# Patient Record
Sex: MALE | Race: WHITE | Employment: OTHER | URBAN - NONMETROPOLITAN AREA
[De-identification: names, ages, dates, MRNs, and addresses within clinical notes are randomized per-mention and may not be internally consistent; named-entity substitution may affect disease eponyms.]

---

## 2021-04-02 ENCOUNTER — HOSPITAL ENCOUNTER (OUTPATIENT)
Age: 61
Discharge: HOME | End: 2021-04-02
Payer: COMMERCIAL

## 2021-04-02 LAB
ABSOLUTE EOS #: 0.31 K/UL (ref 0–0.44)
ABSOLUTE IMMATURE GRANULOCYTE: 0 K/UL (ref 0–0.3)
ABSOLUTE LYMPH #: 0.85 K/UL (ref 1.1–3.7)
ABSOLUTE MONO #: 0.37 K/UL (ref 0.1–1.2)
ALBUMIN SERPL-MCNC: 3.9 G/DL (ref 3.5–5.2)
ALBUMIN/GLOBULIN RATIO: 1.6 (ref 1–2.5)
ALP BLD-CCNC: 81 U/L (ref 40–129)
ALT SERPL-CCNC: 35 U/L (ref 5–41)
ANION GAP SERPL CALCULATED.3IONS-SCNC: 9 MMOL/L (ref 9–17)
AST SERPL-CCNC: 26 U/L
BASOPHILS # BLD: 0 % (ref 0–2)
BASOPHILS ABSOLUTE: 0 K/UL (ref 0–0.2)
BILIRUB SERPL-MCNC: 0.17 MG/DL (ref 0.3–1.2)
BUN BLDV-MCNC: 35 MG/DL (ref 8–23)
BUN/CREAT BLD: 31 (ref 9–20)
CALCIUM SERPL-MCNC: 9 MG/DL (ref 8.6–10.4)
CHLORIDE BLD-SCNC: 106 MMOL/L (ref 98–107)
CHOLESTEROL/HDL RATIO: 5.2
CHOLESTEROL: 157 MG/DL
CO2: 25 MMOL/L (ref 20–31)
CREAT SERPL-MCNC: 1.13 MG/DL (ref 0.7–1.2)
CREATININE URINE: 66.9 MG/DL (ref 39–259)
DIFFERENTIAL TYPE: ABNORMAL
EOSINOPHILS RELATIVE PERCENT: 5 % (ref 1–4)
FERRITIN: 138 UG/L (ref 30–400)
GFR AFRICAN AMERICAN: >60 ML/MIN
GFR NON-AFRICAN AMERICAN: >60 ML/MIN
GFR SERPL CREATININE-BSD FRML MDRD: ABNORMAL ML/MIN/{1.73_M2}
GFR SERPL CREATININE-BSD FRML MDRD: ABNORMAL ML/MIN/{1.73_M2}
GLUCOSE BLD-MCNC: 135 MG/DL (ref 70–99)
HCT VFR BLD CALC: 32.8 % (ref 40.7–50.3)
HDLC SERPL-MCNC: 30 MG/DL
HEMOGLOBIN: 10.7 G/DL (ref 13–17)
IMMATURE GRANULOCYTES: 0 %
IRON SATURATION: 15 % (ref 20–55)
IRON: 45 UG/DL (ref 59–158)
LDL CHOLESTEROL DIRECT: 72 MG/DL
LDL CHOLESTEROL: ABNORMAL MG/DL (ref 0–130)
LYMPHOCYTES # BLD: 14 % (ref 24–43)
MAGNESIUM: 2.2 MG/DL (ref 1.6–2.6)
MCH RBC QN AUTO: 29.3 PG (ref 25.2–33.5)
MCHC RBC AUTO-ENTMCNC: 32.6 G/DL (ref 28.4–34.8)
MCV RBC AUTO: 89.9 FL (ref 82.6–102.9)
MICROALBUMIN/CREAT 24H UR: <12 MG/L
MICROALBUMIN/CREAT UR-RTO: NORMAL MCG/MG CREAT
MONOCYTES # BLD: 6 % (ref 3–12)
MORPHOLOGY: NORMAL
NRBC AUTOMATED: 0 PER 100 WBC
PDW BLD-RTO: 14 % (ref 11.8–14.4)
PLATELET # BLD: 174 K/UL (ref 138–453)
PLATELET ESTIMATE: ABNORMAL
PMV BLD AUTO: 8.9 FL (ref 8.1–13.5)
POTASSIUM SERPL-SCNC: 4.3 MMOL/L (ref 3.7–5.3)
RBC # BLD: 3.65 M/UL (ref 4.21–5.77)
RBC # BLD: ABNORMAL 10*6/UL
SEG NEUTROPHILS: 75 % (ref 36–65)
SEGMENTED NEUTROPHILS ABSOLUTE COUNT: 4.57 K/UL (ref 1.5–8.1)
SODIUM BLD-SCNC: 140 MMOL/L (ref 135–144)
TOTAL IRON BINDING CAPACITY: 295 UG/DL (ref 250–450)
TOTAL PROTEIN: 6.3 G/DL (ref 6.4–8.3)
TRIGL SERPL-MCNC: 419 MG/DL
TSH SERPL DL<=0.05 MIU/L-ACNC: 4.27 MIU/L (ref 0.3–5)
UNSATURATED IRON BINDING CAPACITY: 250 UG/DL (ref 112–347)
URIC ACID: 5.9 MG/DL (ref 3.4–7)
VITAMIN D 25-HYDROXY: 35.9 NG/ML (ref 30–100)
VLDLC SERPL CALC-MCNC: ABNORMAL MG/DL (ref 1–30)
WBC # BLD: 6.1 K/UL (ref 3.5–11.3)
WBC # BLD: ABNORMAL 10*3/UL

## 2021-04-02 PROCEDURE — 83550 IRON BINDING TEST: CPT

## 2021-04-02 PROCEDURE — 85025 COMPLETE CBC W/AUTO DIFF WBC: CPT

## 2021-04-02 PROCEDURE — 80061 LIPID PANEL: CPT

## 2021-04-02 PROCEDURE — 83036 HEMOGLOBIN GLYCOSYLATED A1C: CPT

## 2021-04-02 PROCEDURE — 83540 ASSAY OF IRON: CPT

## 2021-04-02 PROCEDURE — 82570 ASSAY OF URINE CREATININE: CPT

## 2021-04-02 PROCEDURE — 36415 COLL VENOUS BLD VENIPUNCTURE: CPT

## 2021-04-02 PROCEDURE — 80053 COMPREHEN METABOLIC PANEL: CPT

## 2021-04-02 PROCEDURE — 83721 ASSAY OF BLOOD LIPOPROTEIN: CPT

## 2021-04-02 PROCEDURE — 84550 ASSAY OF BLOOD/URIC ACID: CPT

## 2021-04-02 PROCEDURE — 82306 VITAMIN D 25 HYDROXY: CPT

## 2021-04-02 PROCEDURE — 83735 ASSAY OF MAGNESIUM: CPT

## 2021-04-02 PROCEDURE — 82043 UR ALBUMIN QUANTITATIVE: CPT

## 2021-04-02 PROCEDURE — 82728 ASSAY OF FERRITIN: CPT

## 2021-04-02 PROCEDURE — 84443 ASSAY THYROID STIM HORMONE: CPT

## 2021-04-06 LAB
ESTIMATED AVERAGE GLUCOSE: 169 MG/DL
HBA1C MFR BLD: 7.5 % (ref 4–6)

## 2021-08-07 ENCOUNTER — ANESTHESIA EVENT (OUTPATIENT)
Dept: OPERATING ROOM | Age: 61
End: 2021-08-07
Payer: COMMERCIAL

## 2021-08-07 ENCOUNTER — HOSPITAL ENCOUNTER (OUTPATIENT)
Age: 61
Setting detail: OBSERVATION
LOS: 1 days | Discharge: HOME | End: 2021-08-08
Attending: INTERNAL MEDICINE | Admitting: INTERNAL MEDICINE
Payer: COMMERCIAL

## 2021-08-07 ENCOUNTER — ANESTHESIA (OUTPATIENT)
Dept: OPERATING ROOM | Age: 61
End: 2021-08-07
Payer: COMMERCIAL

## 2021-08-07 VITALS — DIASTOLIC BLOOD PRESSURE: 75 MMHG | SYSTOLIC BLOOD PRESSURE: 145 MMHG | OXYGEN SATURATION: 100 %

## 2021-08-07 DIAGNOSIS — K37 APPENDICITIS, UNSPECIFIED APPENDICITIS TYPE: Primary | ICD-10-CM

## 2021-08-07 PROBLEM — K35.80 ACUTE APPENDICITIS: Status: ACTIVE | Noted: 2021-08-07

## 2021-08-07 PROCEDURE — 64488 TAP BLOCK BI INJECTION: CPT | Performed by: NURSE ANESTHETIST, CERTIFIED REGISTERED

## 2021-08-07 PROCEDURE — G0378 HOSPITAL OBSERVATION PER HR: HCPCS

## 2021-08-07 PROCEDURE — 2720000010 HC SURG SUPPLY STERILE: Performed by: SURGERY

## 2021-08-07 PROCEDURE — 2500000003 HC RX 250 WO HCPCS: Performed by: NURSE ANESTHETIST, CERTIFIED REGISTERED

## 2021-08-07 PROCEDURE — 6360000002 HC RX W HCPCS: Performed by: SURGERY

## 2021-08-07 PROCEDURE — 99223 1ST HOSP IP/OBS HIGH 75: CPT | Performed by: SURGERY

## 2021-08-07 PROCEDURE — 44970 LAPAROSCOPY APPENDECTOMY: CPT | Performed by: SURGERY

## 2021-08-07 PROCEDURE — 3700000001 HC ADD 15 MINUTES (ANESTHESIA): Performed by: SURGERY

## 2021-08-07 PROCEDURE — 3700000000 HC ANESTHESIA ATTENDED CARE: Performed by: SURGERY

## 2021-08-07 PROCEDURE — 2580000003 HC RX 258: Performed by: NURSE ANESTHETIST, CERTIFIED REGISTERED

## 2021-08-07 PROCEDURE — 6360000002 HC RX W HCPCS: Performed by: NURSE ANESTHETIST, CERTIFIED REGISTERED

## 2021-08-07 PROCEDURE — 3600000004 HC SURGERY LEVEL 4 BASE: Performed by: SURGERY

## 2021-08-07 PROCEDURE — 7100000001 HC PACU RECOVERY - ADDTL 15 MIN: Performed by: SURGERY

## 2021-08-07 PROCEDURE — 3600000014 HC SURGERY LEVEL 4 ADDTL 15MIN: Performed by: SURGERY

## 2021-08-07 PROCEDURE — 2580000003 HC RX 258: Performed by: SURGERY

## 2021-08-07 PROCEDURE — 2709999900 HC NON-CHARGEABLE SUPPLY: Performed by: SURGERY

## 2021-08-07 PROCEDURE — 7100000000 HC PACU RECOVERY - FIRST 15 MIN: Performed by: SURGERY

## 2021-08-07 PROCEDURE — 6370000000 HC RX 637 (ALT 250 FOR IP): Performed by: SURGERY

## 2021-08-07 RX ORDER — SODIUM CHLORIDE 9 MG/ML
25 INJECTION, SOLUTION INTRAVENOUS PRN
Status: DISCONTINUED | OUTPATIENT
Start: 2021-08-07 | End: 2021-08-08 | Stop reason: HOSPADM

## 2021-08-07 RX ORDER — PREGABALIN 50 MG/1
50 CAPSULE ORAL DAILY
COMMUNITY
End: 2022-04-19

## 2021-08-07 RX ORDER — PRAMIPEXOLE DIHYDROCHLORIDE 0.25 MG/1
1.5 TABLET ORAL NIGHTLY
Status: DISCONTINUED | OUTPATIENT
Start: 2021-08-07 | End: 2021-08-08 | Stop reason: HOSPADM

## 2021-08-07 RX ORDER — PREGABALIN 50 MG/1
50 CAPSULE ORAL DAILY
Status: DISCONTINUED | OUTPATIENT
Start: 2021-08-07 | End: 2021-08-08 | Stop reason: HOSPADM

## 2021-08-07 RX ORDER — MIDAZOLAM HYDROCHLORIDE 1 MG/ML
INJECTION INTRAMUSCULAR; INTRAVENOUS PRN
Status: DISCONTINUED | OUTPATIENT
Start: 2021-08-07 | End: 2021-08-07 | Stop reason: SDUPTHER

## 2021-08-07 RX ORDER — ATORVASTATIN CALCIUM 40 MG/1
40 TABLET, FILM COATED ORAL DAILY
COMMUNITY
End: 2022-04-19

## 2021-08-07 RX ORDER — CYCLOBENZAPRINE HCL 10 MG
5 TABLET ORAL 3 TIMES DAILY PRN
Status: DISCONTINUED | OUTPATIENT
Start: 2021-08-07 | End: 2021-08-08 | Stop reason: HOSPADM

## 2021-08-07 RX ORDER — OXYCODONE HYDROCHLORIDE 5 MG/1
5 TABLET ORAL EVERY 4 HOURS PRN
Status: DISCONTINUED | OUTPATIENT
Start: 2021-08-07 | End: 2021-08-08 | Stop reason: HOSPADM

## 2021-08-07 RX ORDER — IBUPROFEN 800 MG/1
800 TABLET ORAL EVERY 8 HOURS PRN
COMMUNITY
End: 2022-05-03

## 2021-08-07 RX ORDER — DULOXETIN HYDROCHLORIDE 30 MG/1
30 CAPSULE, DELAYED RELEASE ORAL DAILY
Status: DISCONTINUED | OUTPATIENT
Start: 2021-08-07 | End: 2021-08-08 | Stop reason: HOSPADM

## 2021-08-07 RX ORDER — SODIUM CHLORIDE 9 MG/ML
INJECTION INTRAVENOUS PRN
Status: DISCONTINUED | OUTPATIENT
Start: 2021-08-07 | End: 2021-08-07 | Stop reason: SDUPTHER

## 2021-08-07 RX ORDER — OMEPRAZOLE 40 MG/1
40 CAPSULE, DELAYED RELEASE ORAL DAILY
Status: DISCONTINUED | OUTPATIENT
Start: 2021-08-07 | End: 2021-08-07

## 2021-08-07 RX ORDER — GLYCOPYRROLATE 1 MG/5 ML
SYRINGE (ML) INTRAVENOUS PRN
Status: DISCONTINUED | OUTPATIENT
Start: 2021-08-07 | End: 2021-08-07 | Stop reason: SDUPTHER

## 2021-08-07 RX ORDER — SODIUM CHLORIDE 0.9 % (FLUSH) 0.9 %
10 SYRINGE (ML) INJECTION EVERY 12 HOURS SCHEDULED
Status: DISCONTINUED | OUTPATIENT
Start: 2021-08-07 | End: 2021-08-08 | Stop reason: HOSPADM

## 2021-08-07 RX ORDER — ROSUVASTATIN CALCIUM 10 MG/1
10 TABLET, COATED ORAL
COMMUNITY
End: 2022-04-19

## 2021-08-07 RX ORDER — FUROSEMIDE 40 MG/1
40 TABLET ORAL 2 TIMES DAILY
Status: DISCONTINUED | OUTPATIENT
Start: 2021-08-07 | End: 2021-08-08 | Stop reason: HOSPADM

## 2021-08-07 RX ORDER — PROPOFOL 10 MG/ML
INJECTION, EMULSION INTRAVENOUS PRN
Status: DISCONTINUED | OUTPATIENT
Start: 2021-08-07 | End: 2021-08-07 | Stop reason: SDUPTHER

## 2021-08-07 RX ORDER — ROCURONIUM BROMIDE 10 MG/ML
INJECTION, SOLUTION INTRAVENOUS PRN
Status: DISCONTINUED | OUTPATIENT
Start: 2021-08-07 | End: 2021-08-07 | Stop reason: SDUPTHER

## 2021-08-07 RX ORDER — PANTOPRAZOLE SODIUM 40 MG/1
40 TABLET, DELAYED RELEASE ORAL DAILY
Status: DISCONTINUED | OUTPATIENT
Start: 2021-08-07 | End: 2021-08-08 | Stop reason: HOSPADM

## 2021-08-07 RX ORDER — ONDANSETRON 2 MG/ML
4 INJECTION INTRAMUSCULAR; INTRAVENOUS
Status: DISCONTINUED | OUTPATIENT
Start: 2021-08-07 | End: 2021-08-07

## 2021-08-07 RX ORDER — ACETAMINOPHEN 325 MG/1
650 TABLET ORAL EVERY 4 HOURS PRN
Status: DISCONTINUED | OUTPATIENT
Start: 2021-08-07 | End: 2021-08-08 | Stop reason: HOSPADM

## 2021-08-07 RX ORDER — SODIUM CHLORIDE, SODIUM LACTATE, POTASSIUM CHLORIDE, CALCIUM CHLORIDE 600; 310; 30; 20 MG/100ML; MG/100ML; MG/100ML; MG/100ML
INJECTION, SOLUTION INTRAVENOUS CONTINUOUS
Status: DISCONTINUED | OUTPATIENT
Start: 2021-08-07 | End: 2021-08-08

## 2021-08-07 RX ORDER — ROSUVASTATIN CALCIUM 10 MG/1
10 TABLET, COATED ORAL
Status: DISCONTINUED | OUTPATIENT
Start: 2021-08-09 | End: 2021-08-07

## 2021-08-07 RX ORDER — DULOXETIN HYDROCHLORIDE 30 MG/1
60 CAPSULE, DELAYED RELEASE ORAL DAILY
COMMUNITY
End: 2022-04-19

## 2021-08-07 RX ORDER — METOPROLOL SUCCINATE 50 MG/1
25 TABLET, EXTENDED RELEASE ORAL 2 TIMES DAILY
COMMUNITY
End: 2022-10-18 | Stop reason: SDUPTHER

## 2021-08-07 RX ORDER — SODIUM CHLORIDE 0.9 % (FLUSH) 0.9 %
10 SYRINGE (ML) INJECTION PRN
Status: DISCONTINUED | OUTPATIENT
Start: 2021-08-07 | End: 2021-08-08 | Stop reason: HOSPADM

## 2021-08-07 RX ORDER — FUROSEMIDE 40 MG/1
40 TABLET ORAL 2 TIMES DAILY
COMMUNITY

## 2021-08-07 RX ORDER — FENTANYL CITRATE 50 UG/ML
INJECTION, SOLUTION INTRAMUSCULAR; INTRAVENOUS PRN
Status: DISCONTINUED | OUTPATIENT
Start: 2021-08-07 | End: 2021-08-07 | Stop reason: SDUPTHER

## 2021-08-07 RX ORDER — CLONIDINE 100 UG/ML
INJECTION, SOLUTION EPIDURAL PRN
Status: DISCONTINUED | OUTPATIENT
Start: 2021-08-07 | End: 2021-08-07 | Stop reason: SDUPTHER

## 2021-08-07 RX ORDER — METOPROLOL SUCCINATE 25 MG/1
25 TABLET, EXTENDED RELEASE ORAL 2 TIMES DAILY
Status: DISCONTINUED | OUTPATIENT
Start: 2021-08-07 | End: 2021-08-08 | Stop reason: HOSPADM

## 2021-08-07 RX ORDER — CYCLOBENZAPRINE HCL 5 MG
5 TABLET ORAL 3 TIMES DAILY PRN
COMMUNITY
End: 2022-04-19

## 2021-08-07 RX ORDER — LABETALOL HYDROCHLORIDE 5 MG/ML
INJECTION, SOLUTION INTRAVENOUS PRN
Status: DISCONTINUED | OUTPATIENT
Start: 2021-08-07 | End: 2021-08-07 | Stop reason: SDUPTHER

## 2021-08-07 RX ORDER — KETOROLAC TROMETHAMINE 30 MG/ML
INJECTION, SOLUTION INTRAMUSCULAR; INTRAVENOUS PRN
Status: DISCONTINUED | OUTPATIENT
Start: 2021-08-07 | End: 2021-08-07 | Stop reason: SDUPTHER

## 2021-08-07 RX ORDER — DEXAMETHASONE SODIUM PHOSPHATE 10 MG/ML
INJECTION, SOLUTION INTRAMUSCULAR; INTRAVENOUS PRN
Status: DISCONTINUED | OUTPATIENT
Start: 2021-08-07 | End: 2021-08-07 | Stop reason: SDUPTHER

## 2021-08-07 RX ORDER — NEOSTIGMINE METHYLSULFATE 1 MG/ML
INJECTION, SOLUTION INTRAVENOUS PRN
Status: DISCONTINUED | OUTPATIENT
Start: 2021-08-07 | End: 2021-08-07 | Stop reason: SDUPTHER

## 2021-08-07 RX ORDER — LISINOPRIL 20 MG/1
20 TABLET ORAL DAILY
COMMUNITY

## 2021-08-07 RX ORDER — FENTANYL CITRATE 50 UG/ML
50 INJECTION, SOLUTION INTRAMUSCULAR; INTRAVENOUS EVERY 5 MIN PRN
Status: DISCONTINUED | OUTPATIENT
Start: 2021-08-07 | End: 2021-08-07

## 2021-08-07 RX ORDER — ATORVASTATIN CALCIUM 40 MG/1
40 TABLET, FILM COATED ORAL DAILY
Status: DISCONTINUED | OUTPATIENT
Start: 2021-08-07 | End: 2021-08-08 | Stop reason: HOSPADM

## 2021-08-07 RX ORDER — LIDOCAINE HYDROCHLORIDE 20 MG/ML
INJECTION, SOLUTION EPIDURAL; INFILTRATION; INTRACAUDAL; PERINEURAL PRN
Status: DISCONTINUED | OUTPATIENT
Start: 2021-08-07 | End: 2021-08-07 | Stop reason: SDUPTHER

## 2021-08-07 RX ORDER — MORPHINE SULFATE 4 MG/ML
4 INJECTION, SOLUTION INTRAMUSCULAR; INTRAVENOUS
Status: DISCONTINUED | OUTPATIENT
Start: 2021-08-07 | End: 2021-08-08 | Stop reason: HOSPADM

## 2021-08-07 RX ORDER — FENTANYL CITRATE 50 UG/ML
25 INJECTION, SOLUTION INTRAMUSCULAR; INTRAVENOUS EVERY 5 MIN PRN
Status: DISCONTINUED | OUTPATIENT
Start: 2021-08-07 | End: 2021-08-07 | Stop reason: HOSPADM

## 2021-08-07 RX ORDER — ROPIVACAINE HYDROCHLORIDE 5 MG/ML
INJECTION, SOLUTION EPIDURAL; INFILTRATION; PERINEURAL PRN
Status: DISCONTINUED | OUTPATIENT
Start: 2021-08-07 | End: 2021-08-07 | Stop reason: SDUPTHER

## 2021-08-07 RX ORDER — MORPHINE SULFATE 2 MG/ML
2 INJECTION, SOLUTION INTRAMUSCULAR; INTRAVENOUS
Status: DISCONTINUED | OUTPATIENT
Start: 2021-08-07 | End: 2021-08-08 | Stop reason: HOSPADM

## 2021-08-07 RX ORDER — OMEPRAZOLE 40 MG/1
20 CAPSULE, DELAYED RELEASE ORAL DAILY
COMMUNITY
End: 2022-04-19

## 2021-08-07 RX ORDER — METHOCARBAMOL 500 MG/1
500 TABLET, FILM COATED ORAL NIGHTLY
COMMUNITY
End: 2022-04-19 | Stop reason: SDUPTHER

## 2021-08-07 RX ORDER — MAGNESIUM OXIDE 400 MG/1
400 TABLET ORAL 2 TIMES DAILY
COMMUNITY
End: 2022-04-19

## 2021-08-07 RX ORDER — DEXAMETHASONE SODIUM PHOSPHATE 4 MG/ML
INJECTION, SOLUTION INTRA-ARTICULAR; INTRALESIONAL; INTRAMUSCULAR; INTRAVENOUS; SOFT TISSUE PRN
Status: DISCONTINUED | OUTPATIENT
Start: 2021-08-07 | End: 2021-08-07 | Stop reason: SDUPTHER

## 2021-08-07 RX ORDER — PRAMIPEXOLE DIHYDROCHLORIDE 1.5 MG/1
1.5 TABLET ORAL NIGHTLY
COMMUNITY

## 2021-08-07 RX ORDER — ONDANSETRON 2 MG/ML
INJECTION INTRAMUSCULAR; INTRAVENOUS PRN
Status: DISCONTINUED | OUTPATIENT
Start: 2021-08-07 | End: 2021-08-07 | Stop reason: SDUPTHER

## 2021-08-07 RX ORDER — FENOFIBRATE 48 MG/1
160 TABLET, COATED ORAL DAILY
COMMUNITY
End: 2022-04-20

## 2021-08-07 RX ORDER — SODIUM CHLORIDE, SODIUM LACTATE, POTASSIUM CHLORIDE, CALCIUM CHLORIDE 600; 310; 30; 20 MG/100ML; MG/100ML; MG/100ML; MG/100ML
INJECTION, SOLUTION INTRAVENOUS CONTINUOUS
Status: DISCONTINUED | OUTPATIENT
Start: 2021-08-07 | End: 2021-08-07

## 2021-08-07 RX ORDER — LISINOPRIL 20 MG/1
20 TABLET ORAL DAILY
Status: DISCONTINUED | OUTPATIENT
Start: 2021-08-07 | End: 2021-08-08 | Stop reason: HOSPADM

## 2021-08-07 RX ADMIN — PRAMIPEXOLE DIHYDROCHLORIDE 1.5 MG: 0.25 TABLET ORAL at 20:42

## 2021-08-07 RX ADMIN — METFORMIN HYDROCHLORIDE 500 MG: 500 TABLET ORAL at 20:42

## 2021-08-07 RX ADMIN — KETOROLAC TROMETHAMINE 30 MG: 30 INJECTION, SOLUTION INTRAMUSCULAR; INTRAVENOUS at 16:26

## 2021-08-07 RX ADMIN — LABETALOL HYDROCHLORIDE 10 MG: 5 INJECTION, SOLUTION INTRAVENOUS at 15:57

## 2021-08-07 RX ADMIN — ROCURONIUM BROMIDE 50 MG: 10 INJECTION, SOLUTION INTRAVENOUS at 15:33

## 2021-08-07 RX ADMIN — DEXAMETHASONE SODIUM PHOSPHATE 10 MG: 10 INJECTION, SOLUTION INTRAMUSCULAR; INTRAVENOUS at 15:21

## 2021-08-07 RX ADMIN — METOPROLOL SUCCINATE 25 MG: 25 TABLET, FILM COATED, EXTENDED RELEASE ORAL at 19:41

## 2021-08-07 RX ADMIN — ATORVASTATIN CALCIUM 40 MG: 40 TABLET, FILM COATED ORAL at 19:41

## 2021-08-07 RX ADMIN — ROPIVACAINE HYDROCHLORIDE 54 ML: 5 INJECTION, SOLUTION EPIDURAL; INFILTRATION; PERINEURAL at 15:21

## 2021-08-07 RX ADMIN — Medication 1 MG: at 16:36

## 2021-08-07 RX ADMIN — LISINOPRIL 20 MG: 20 TABLET ORAL at 19:42

## 2021-08-07 RX ADMIN — CEFAZOLIN 2000 MG: 10 INJECTION, POWDER, FOR SOLUTION INTRAVENOUS at 15:26

## 2021-08-07 RX ADMIN — FUROSEMIDE 40 MG: 40 TABLET ORAL at 19:42

## 2021-08-07 RX ADMIN — CEFAZOLIN 2000 MG: 10 INJECTION, POWDER, FOR SOLUTION INTRAVENOUS at 22:55

## 2021-08-07 RX ADMIN — SODIUM CHLORIDE 36 ML: 9 INJECTION, SOLUTION INTRAMUSCULAR; INTRAVENOUS; SUBCUTANEOUS at 15:21

## 2021-08-07 RX ADMIN — DULOXETINE HYDROCHLORIDE 30 MG: 30 CAPSULE, DELAYED RELEASE ORAL at 19:41

## 2021-08-07 RX ADMIN — Medication 3 MG: at 16:27

## 2021-08-07 RX ADMIN — CLONIDINE HYDROCHLORIDE 100 MCG: 100 INJECTION, SOLUTION INTRAVENOUS at 16:01

## 2021-08-07 RX ADMIN — DEXAMETHASONE SODIUM PHOSPHATE 4 MG: 4 INJECTION, SOLUTION INTRAMUSCULAR; INTRAVENOUS at 16:05

## 2021-08-07 RX ADMIN — SODIUM CHLORIDE, POTASSIUM CHLORIDE, SODIUM LACTATE AND CALCIUM CHLORIDE: 600; 310; 30; 20 INJECTION, SOLUTION INTRAVENOUS at 18:05

## 2021-08-07 RX ADMIN — FENTANYL CITRATE 50 MCG: 50 INJECTION, SOLUTION INTRAMUSCULAR; INTRAVENOUS at 15:33

## 2021-08-07 RX ADMIN — Medication 0.2 MG: at 16:36

## 2021-08-07 RX ADMIN — MIDAZOLAM 2 MG: 1 INJECTION INTRAMUSCULAR; INTRAVENOUS at 15:15

## 2021-08-07 RX ADMIN — PREGABALIN 50 MG: 50 CAPSULE ORAL at 19:42

## 2021-08-07 RX ADMIN — FENTANYL CITRATE 25 MCG: 50 INJECTION, SOLUTION INTRAMUSCULAR; INTRAVENOUS at 15:57

## 2021-08-07 RX ADMIN — PANTOPRAZOLE SODIUM 40 MG: 40 TABLET, DELAYED RELEASE ORAL at 19:42

## 2021-08-07 RX ADMIN — SODIUM CHLORIDE, POTASSIUM CHLORIDE, SODIUM LACTATE AND CALCIUM CHLORIDE: 600; 310; 30; 20 INJECTION, SOLUTION INTRAVENOUS at 13:11

## 2021-08-07 RX ADMIN — Medication 0.4 MG: at 16:27

## 2021-08-07 RX ADMIN — LIDOCAINE HYDROCHLORIDE 60 MG: 20 INJECTION, SOLUTION EPIDURAL; INFILTRATION; INTRACAUDAL; PERINEURAL at 15:33

## 2021-08-07 RX ADMIN — FENTANYL CITRATE 25 MCG: 50 INJECTION, SOLUTION INTRAMUSCULAR; INTRAVENOUS at 16:32

## 2021-08-07 RX ADMIN — OXYCODONE 5 MG: 5 TABLET ORAL at 22:56

## 2021-08-07 RX ADMIN — PROPOFOL 150 MG: 10 INJECTION, EMULSION INTRAVENOUS at 15:33

## 2021-08-07 RX ADMIN — MORPHINE SULFATE 4 MG: 4 INJECTION, SOLUTION INTRAMUSCULAR; INTRAVENOUS at 18:03

## 2021-08-07 RX ADMIN — ONDANSETRON 4 MG: 2 INJECTION INTRAMUSCULAR; INTRAVENOUS at 16:05

## 2021-08-07 RX ADMIN — SODIUM CHLORIDE, POTASSIUM CHLORIDE, SODIUM LACTATE AND CALCIUM CHLORIDE: 600; 310; 30; 20 INJECTION, SOLUTION INTRAVENOUS at 18:26

## 2021-08-07 ASSESSMENT — PAIN DESCRIPTION - DESCRIPTORS
DESCRIPTORS: CONSTANT
DESCRIPTORS: CONSTANT
DESCRIPTORS: CRAMPING
DESCRIPTORS: CONSTANT
DESCRIPTORS: CONSTANT

## 2021-08-07 ASSESSMENT — PAIN SCALES - GENERAL
PAINLEVEL_OUTOF10: 4
PAINLEVEL_OUTOF10: 2
PAINLEVEL_OUTOF10: 3
PAINLEVEL_OUTOF10: 3
PAINLEVEL_OUTOF10: 4
PAINLEVEL_OUTOF10: 9
PAINLEVEL_OUTOF10: 10

## 2021-08-07 ASSESSMENT — PAIN DESCRIPTION - ORIENTATION
ORIENTATION: LEFT;LOWER
ORIENTATION: RIGHT;LOWER
ORIENTATION: LEFT;LOWER
ORIENTATION: LEFT;LOWER

## 2021-08-07 ASSESSMENT — PAIN DESCRIPTION - LOCATION
LOCATION: ABDOMEN

## 2021-08-07 ASSESSMENT — PAIN - FUNCTIONAL ASSESSMENT
PAIN_FUNCTIONAL_ASSESSMENT: ACTIVITIES ARE NOT PREVENTED
PAIN_FUNCTIONAL_ASSESSMENT: 0-10
PAIN_FUNCTIONAL_ASSESSMENT: PREVENTS OR INTERFERES SOME ACTIVE ACTIVITIES AND ADLS

## 2021-08-07 ASSESSMENT — PAIN DESCRIPTION - PAIN TYPE
TYPE: ACUTE PAIN
TYPE: SURGICAL PAIN

## 2021-08-07 ASSESSMENT — PAIN DESCRIPTION - FREQUENCY
FREQUENCY: CONTINUOUS

## 2021-08-07 ASSESSMENT — PAIN DESCRIPTION - PROGRESSION
CLINICAL_PROGRESSION: NOT CHANGED
CLINICAL_PROGRESSION: NOT CHANGED

## 2021-08-07 NOTE — PROGRESS NOTES
Patient is back from the surgery. Vitals are checked and pt.'s B/P is elevated at this time. Will continue to monitor.

## 2021-08-07 NOTE — PROGRESS NOTES
Patient's spouse called for an update and she has been notified that the surgery picked up the patient and she will be informed when the surgery is completed.

## 2021-08-07 NOTE — ANESTHESIA PROCEDURE NOTES
Peripheral Block    Patient location during procedure: pre-op  Start time: 8/7/2021 3:15 PM  End time: 8/7/2021 3:21 PM  Staffing  Performed: resident/CRNA   Resident/CRNA: ANNMARIE Kinsey CRNA  Preanesthetic Checklist  Completed: patient identified, IV checked, site marked, risks and benefits discussed, surgical consent, monitors and equipment checked, pre-op evaluation, timeout performed, anesthesia consent given, oxygen available and patient being monitored  Peripheral Block  Patient position: low Fowlers. Prep: ChloraPrep  Patient monitoring: continuous pulse ox and IV access (NIBP and EKG immediately available)  Block type: TAP  Laterality: bilateral  Injection technique: single-shot  Guidance: ultrasound guided  Local infiltration: ropivacaine and decadron  Provider prep: mask and sterile gloves  Local infiltration: ropivacaine and decadron  Needle  Needle type:  Other   Needle gauge: 22 G  Needle length: 8 cm  Needle localization: ultrasound guidanceOther needle type: Pajunk sonotap  Assessment  Injection assessment: no paresthesia on injection and negative aspiration for heme (local spread in fascial planes observed)  Paresthesia pain: none  Slow fractionated injection: yes  Hemodynamics: stable  Additional Notes  27 ml ropivacaine 0.5%+18 ml NaCl inj+5 mg dexamethasone per side  Patient tolerated well  Reason for block: post-op pain management and at surgeon's request

## 2021-08-07 NOTE — OP NOTE
OPERATIVE NOTE    DATE OF PROCEDURE: 8/7/2021     SURGEON: Adolph Stewart MD     ASSISTANT: None    PREOPERATIVE DIAGNOSIS: Acute appendicitis    POSTOPERATIVE DIAGNOSIS: Same    OPERATION: Laparoscopic appendectomy    ANESTHESIA: General    ESTIMATED BLOOD LOSS: 50 cc    COMPLICATIONS: None     SPECIMENS:   ID Type Source Tests Collected by Time Destination   A :  Tissue Appendix SURGICAL PATHOLOGY Adolph Stewart MD 8/7/2021 7206         HISTORY: The patient is a 57y.o. year old male with history of above preop diagnosis. I explained the risk, benefits, expected outcome, and alternatives to the procedure. Patient understands and is in agreement. PROCEDURE: Patient was brought to the operating room. He is placed in supine position. Inhalation anesthesia is induced. The abdomen is prepared and draped standard fashion. Supraumbilical midline incision is made with a #11 scalpel blade. Access is gained to the peritoneal cavity standard fashion. Nelida Pucker port is placed and peritoneal insufflation is performed. The patient is then placed headdown right side up. Two 5 mm ports were placed in standard locations under direct vision in the left lower abdomen. The appendix is located. It is slightly adhesed to the retroperitoneum as well as the mesentery of the terminal ileum. The appendix and cecum were mobilized by incising the lateral peritoneum sharply. I then used blunt dissection to separate the appendix from the terminal ileum and mesentery to the terminal ileum. A window was then created between the base of the appendix and mesoappendix. A vascular load of the ARACELI stapling device was then fired across the mesoappendix. I then used a ARACELI load of the ARACELI stapling device to fire across the base of the appendix right at the cecum. The specimen is removed in an Endo Catch. There was evidence of acute inflammation but I did not see any evidence of purulence or perforation.   The patient did have

## 2021-08-07 NOTE — H&P
Subjective:      Patient ID: Margoth Juarez is a 57 y.o. male who presents today for:  No chief complaint on file. ANDI Perea presents with 2-3 day history of gradually worsening abdominal pain that is localized to the right lower quadrant. He has had some nausea but no vomiting. He denies fever. Denies change in bowels. Denies urinary symptoms. Past Medical History:   Diagnosis Date    Arthritis     CAD (coronary artery disease)     Cancer ([address]     Tonsil    Hyperlipidemia     Hypertension     Movement disorder        Past Surgical History:   Procedure Laterality Date    CARDIAC SURGERY      JOINT REPLACEMENT  06/25/2021    RIGHT KNEE SURGERY       Allergies   Allergen Reactions    Penicillins Rash    Statins Rash       No current facility-administered medications on file prior to encounter. No current outpatient medications on file prior to encounter. Objective:   Physical Examination:    BP (!) 145/69   Pulse 58   Temp 97.9 °F (36.6 °C) (Temporal)   Resp 18   Ht 5' 9\" (1.753 m)   Wt 240 lb 9.6 oz (109.1 kg)   SpO2 98%   BMI 35.53 kg/m²     Physical Exam  Vitals and nursing note reviewed. Constitutional:       Appearance: Normal appearance. He is normal weight. HENT:      Head: Normocephalic and atraumatic. Nose: Nose normal.   Eyes:      General: No scleral icterus. Extraocular Movements: Extraocular movements intact. Pupils: Pupils are equal, round, and reactive to light. Cardiovascular:      Rate and Rhythm: Normal rate and regular rhythm. Pulses: Normal pulses. Heart sounds: Normal heart sounds. No murmur heard. No friction rub. No gallop. Abdominal:      General: There is no distension. Palpations: There is no mass. Tenderness: There is abdominal tenderness (RLQ). Hernia: No hernia is present. Genitourinary:     Testes: Normal.   Musculoskeletal:         General: Normal range of motion.       Cervical back: Normal range of motion and neck supple. Skin:     General: Skin is warm. Coloration: Skin is not jaundiced. Neurological:      General: No focal deficit present. Mental Status: He is alert and oriented to person, place, and time. Psychiatric:         Mood and Affect: Mood normal.         Behavior: Behavior normal.        CT scan done in [place] demonstrated acute appendicitis. Assessment:     Patient Active Problem List   Diagnosis    Appendicitis              Plan:   Abdullahi Gee will be taken to surgery later today for laparoscopic appendectomy.           Domenick Najjar, MD   8/7/2021 11:48 AM EDT

## 2021-08-07 NOTE — PLAN OF CARE
Problem: Pain:  Goal: Pain level will decrease  Description: Pain level will decrease  Outcome: Ongoing  Note: Patient is able to communicate when pain intervention is required. Patient is also able to rate the pain on a scale of 0-10. Pain is assessed with hourly rounding while patient is awake. Patient has been denying the need of pain med as of now. Will continue to monitor. Goal: Control of acute pain  Description: Control of acute pain  Outcome: Ongoing  Goal: Control of chronic pain  Description: Control of chronic pain  Outcome: Ongoing     Problem: Falls - Risk of:  Goal: Will remain free from falls  Description: Will remain free from falls  Outcome: Ongoing  Note: Patient is a medium fall risk. Patient is alert and oriented and calls out appropriately. Bed wheels locked and kept in lowest position. Nonskid wear on, fall sign posted and fall sticker on. Bedside table and call light within reach. Needs assessed with hourly rounding and environment scanned for any safety issue. Goal: Absence of physical injury  Description: Absence of physical injury  Outcome: Ongoing     Problem: Infection - Surgical Site:  Goal: Will show no infection signs and symptoms  Description: Will show no infection signs and symptoms  Outcome: Ongoing  Note: Patient has been taught about the surgical site infection pre-op. Prophylactic antibiotic has been ordered for surgery. Will continue to monitor patient's vitals and assess the surgical site.

## 2021-08-07 NOTE — PROGRESS NOTES
Patient valuable (Eye glasses and [employer]) has been locked in the cabinet with his label on before leaving for the surgery. Patient took his cellphone with him to surgery.

## 2021-08-07 NOTE — PROGRESS NOTES
The writer had patient on Q15 vitals check after he came from surgery and got busy with the rapid response so could not put the vitals in the patient's chart until later. When the writer tried to go back and put the vitals back in, could not find it in the machine even though the machine was taking the vitals as noticed. The oncoming nurse has been notified of it. The latest set of vitals is normal, so he will be continued to be monitored.

## 2021-08-07 NOTE — PROGRESS NOTES
[facility] Patient was a stand and pivot from surgery cart to bed. First HR was 44 - 48 bpm. Updated  Grace JEFF.

## 2021-08-07 NOTE — ANESTHESIA PRE PROCEDURE
Department of Anesthesiology  Preprocedure Note       Name:  Humaira Tariq   Age:  57 y.o.  :  1960                                          MRN:  [id number]         Date:  2021      Surgeon: Aria Okeefe):  Marjan Schumacher MD    Procedure: Procedure(s):  APPENDECTOMY LAPAROSCOPIC    Medications prior to admission:   Prior to Admission medications    Not on File       Current medications:    Current Facility-Administered Medications   Medication Dose Route Frequency Provider Last Rate Last Kari Dillard [facility]] ceFAZolin (ANCEF) 2000 mg in dextrose 5 % 100 mL IVPB  2,000 mg Intravenous On Call to Kamran Mcclellan MD        [facility]] lactated ringers infusion   Intravenous Continuous Marjan Schumacher  mL/hr at 21 1311 New Bag at 21 1311       Allergies:     Allergies   Allergen Reactions    Penicillins Rash    Statins Rash       Problem List:    Patient Active Problem List   Diagnosis Code    Appendicitis K37       Past Medical History:        Diagnosis Date    Arthritis     CAD (coronary artery disease)     Cancer ([facility] [address]     Tonsil    Hyperlipidemia     Hypertension     Movement disorder        Past Surgical History:        Procedure Laterality Date    CARDIAC SURGERY      JOINT REPLACEMENT  2021    RIGHT KNEE SURGERY       Social History:    Social History     Tobacco Use    Smoking status: Former Smoker    Smokeless tobacco: Never Used   Substance Use Topics    Alcohol use: Yes     Comment: Occasional                                Counseling given: Not Answered      Vital Signs (Current):   Vitals:    21 1044 21 1355   BP: (!) 145/69 (!) 126/93   Pulse: 58 60   Resp: 18 16   Temp: 36.6 °C (97.9 °F) 36.4 °C (97.5 °F)   TempSrc: Temporal Temporal   SpO2: 98% 97%   Weight: 240 lb 9.6 oz (109.1 kg)    Height: 5' 9\" (1.753 m)                                               BP Readings from Last 3 Encounters:   21 (!) 126/93       NPO Status: Time of last liquid consumption: 2300                        Time of last solid consumption: 2300                        Date of last liquid consumption: 08/06/21                        Date of last solid food consumption: 08/06/21    BMI:   Wt Readings from Last 3 Encounters:   08/07/21 240 lb 9.6 oz (109.1 kg)     Body mass index is 35.53 kg/m². CBC:   Lab Results   Component Value Date    WBC 6.1 04/02/2021    RBC 3.65 04/02/2021    HGB 10.7 04/02/2021    HCT 32.8 04/02/2021    MCV 89.9 04/02/2021    RDW 14.0 04/02/2021     04/02/2021       CMP:   Lab Results   Component Value Date     04/02/2021    K 4.3 04/02/2021     04/02/2021    CO2 25 04/02/2021    BUN 35 04/02/2021    CREATININE 1.13 04/02/2021    GFRAA >60 04/02/2021    LABGLOM >60 04/02/2021    GLUCOSE 135 04/02/2021    PROT 6.3 04/02/2021    CALCIUM 9.0 04/02/2021    BILITOT 0.17 04/02/2021    ALKPHOS 81 04/02/2021    AST 26 04/02/2021    ALT 35 04/02/2021       POC Tests: No results for input(s): POCGLU, POCNA, POCK, POCCL, POCBUN, POCHEMO, POCHCT in the last 72 hours.     Coags: No results found for: PROTIME, INR, APTT    HCG (If Applicable): No results found for: PREGTESTUR, PREGSERUM, HCG, HCGQUANT     ABGs: No results found for: PHART, PO2ART, DSP9KQD, YQJ8CIW, BEART, [id number]     Type & Screen (If Applicable):  No results found for: LABABO, LABRH    Drug/Infectious Status (If Applicable):  No results found for: HIV, HEPCAB    COVID-19 Screening (If Applicable): No results found for: COVID19        Anesthesia Evaluation  Patient summary reviewed and Nursing notes reviewed no history of anesthetic complications:   Airway: Mallampati: II  TM distance: >3 FB   Neck ROM: limited  Comment: limited ROM laterally ~ 45\"  Mouth opening: > = 3 FB Dental:          Pulmonary:Negative Pulmonary ROS   (+) wheezes,                            PE comment: expiratory wheeze on right, left is CTA Cardiovascular:  Exercise tolerance: good (>4 METS),   (+) hypertension:, CAD: no interval change, CABG/stent: no interval change, hyperlipidemia        Rhythm: regular  Rate: normal                 ROS comment: 3V CABG 2011, LT cardiologist 6 mo in [facility] - pt recently moved  Patient from [place], no EKG, Echo or other cardiac workup present. Neuro/Psych:                ROS comment: severe restless leg syndrome GI/Hepatic/Renal:   (+) GERD: poorly controlled,          ROS comment: patient states 40 lb weight loss in 2 months, unexplained \"i can't figure out why\", says he has not mentioned to any of his doctors. Endo/Other:    (+) DiabetesType II DM, well controlled, , blood dyscrasia: anemia, arthritis: OA., malignancy/cancer. ROS comment: chronic anemia, gout  h/o throat CA 2013 - 8 wks chemo and 8 wks radiation - several surgeries since that time without difficult intubation  Patient states BS today was 116 Abdominal:   (+) obese,     Abdomen: tender. PE comment: distended   Vascular: negative vascular ROS. Other Findings:           Anesthesia Plan      general     ASA 3             Anesthetic plan and risks discussed with patient (Patient agrees to bilateral TAP block as part of postoperative pain management).                       ANNMARIE Manuel - CRNA   8/7/2021

## 2021-08-07 NOTE — PROGRESS NOTES
Patient's home medication list has been added to his chart. There are some of the medication not completed because of unclear instruction of dosage. Will pass the message to the oncoming nurse to complete it after talking to the patient.

## 2021-08-08 VITALS
RESPIRATION RATE: 18 BRPM | OXYGEN SATURATION: 96 % | BODY MASS INDEX: 35.77 KG/M2 | HEART RATE: 73 BPM | DIASTOLIC BLOOD PRESSURE: 54 MMHG | TEMPERATURE: 98.5 F | WEIGHT: 241.5 LBS | SYSTOLIC BLOOD PRESSURE: 126 MMHG | HEIGHT: 69 IN

## 2021-08-08 LAB
ABSOLUTE EOS #: 0 K/UL (ref 0–0.44)
ABSOLUTE IMMATURE GRANULOCYTE: 0 K/UL (ref 0–0.3)
ABSOLUTE LYMPH #: 0.21 K/UL (ref 1.1–3.7)
ABSOLUTE MONO #: 0.07 K/UL (ref 0.1–1.2)
BASOPHILS # BLD: 0 % (ref 0–2)
BASOPHILS ABSOLUTE: 0 K/UL (ref 0–0.2)
DIFFERENTIAL TYPE: ABNORMAL
EOSINOPHILS RELATIVE PERCENT: 0 % (ref 1–4)
HCT VFR BLD CALC: 29.4 % (ref 40.7–50.3)
HEMOGLOBIN: 9.3 G/DL (ref 13–17)
IMMATURE GRANULOCYTES: 0 %
LYMPHOCYTES # BLD: 3 % (ref 24–43)
MCH RBC QN AUTO: 29.4 PG (ref 25.2–33.5)
MCHC RBC AUTO-ENTMCNC: 31.6 G/DL (ref 28.4–34.8)
MCV RBC AUTO: 93 FL (ref 82.6–102.9)
MONOCYTES # BLD: 1 % (ref 3–12)
MORPHOLOGY: NORMAL
NRBC AUTOMATED: 0 PER 100 WBC
PDW BLD-RTO: 14.1 % (ref 11.8–14.4)
PLATELET # BLD: 208 K/UL (ref 138–453)
PLATELET ESTIMATE: ABNORMAL
PMV BLD AUTO: 9.1 FL (ref 8.1–13.5)
RBC # BLD: 3.16 M/UL (ref 4.21–5.77)
RBC # BLD: ABNORMAL 10*6/UL
SEG NEUTROPHILS: 96 % (ref 36–65)
SEGMENTED NEUTROPHILS ABSOLUTE COUNT: 6.82 K/UL (ref 1.5–8.1)
WBC # BLD: 7.1 K/UL (ref 3.5–11.3)
WBC # BLD: ABNORMAL 10*3/UL

## 2021-08-08 PROCEDURE — 94761 N-INVAS EAR/PLS OXIMETRY MLT: CPT

## 2021-08-08 PROCEDURE — 88304 TISSUE EXAM BY PATHOLOGIST: CPT

## 2021-08-08 PROCEDURE — 36415 COLL VENOUS BLD VENIPUNCTURE: CPT

## 2021-08-08 PROCEDURE — 6370000000 HC RX 637 (ALT 250 FOR IP): Performed by: SURGERY

## 2021-08-08 PROCEDURE — 2580000003 HC RX 258: Performed by: SURGERY

## 2021-08-08 PROCEDURE — G0378 HOSPITAL OBSERVATION PER HR: HCPCS

## 2021-08-08 PROCEDURE — 85025 COMPLETE CBC W/AUTO DIFF WBC: CPT

## 2021-08-08 RX ORDER — HYDROCODONE BITARTRATE AND ACETAMINOPHEN 5; 325 MG/1; MG/1
1 TABLET ORAL EVERY 6 HOURS PRN
Qty: 10 TABLET | Refills: 0 | Status: SHIPPED | OUTPATIENT
Start: 2021-08-08 | End: 2021-08-11

## 2021-08-08 RX ADMIN — PREGABALIN 50 MG: 50 CAPSULE ORAL at 08:02

## 2021-08-08 RX ADMIN — DULOXETINE HYDROCHLORIDE 30 MG: 30 CAPSULE, DELAYED RELEASE ORAL at 08:02

## 2021-08-08 RX ADMIN — ATORVASTATIN CALCIUM 40 MG: 40 TABLET, FILM COATED ORAL at 08:03

## 2021-08-08 RX ADMIN — METOPROLOL SUCCINATE 25 MG: 25 TABLET, FILM COATED, EXTENDED RELEASE ORAL at 08:03

## 2021-08-08 RX ADMIN — LISINOPRIL 20 MG: 20 TABLET ORAL at 08:02

## 2021-08-08 RX ADMIN — OXYCODONE 5 MG: 5 TABLET ORAL at 09:43

## 2021-08-08 RX ADMIN — SODIUM CHLORIDE, PRESERVATIVE FREE 10 ML: 5 INJECTION INTRAVENOUS at 08:03

## 2021-08-08 RX ADMIN — PANTOPRAZOLE SODIUM 40 MG: 40 TABLET, DELAYED RELEASE ORAL at 08:03

## 2021-08-08 RX ADMIN — FUROSEMIDE 40 MG: 40 TABLET ORAL at 08:03

## 2021-08-08 ASSESSMENT — PAIN SCALES - GENERAL
PAINLEVEL_OUTOF10: 0
PAINLEVEL_OUTOF10: 2
PAINLEVEL_OUTOF10: 5
PAINLEVEL_OUTOF10: 6

## 2021-08-08 ASSESSMENT — PAIN DESCRIPTION - ONSET: ONSET: GRADUAL

## 2021-08-08 ASSESSMENT — PAIN DESCRIPTION - PROGRESSION: CLINICAL_PROGRESSION: GRADUALLY WORSENING

## 2021-08-08 ASSESSMENT — PAIN - FUNCTIONAL ASSESSMENT: PAIN_FUNCTIONAL_ASSESSMENT: ACTIVITIES ARE NOT PREVENTED

## 2021-08-08 ASSESSMENT — PAIN DESCRIPTION - PAIN TYPE: TYPE: SURGICAL PAIN

## 2021-08-08 ASSESSMENT — PAIN DESCRIPTION - FREQUENCY: FREQUENCY: INTERMITTENT

## 2021-08-08 ASSESSMENT — PAIN DESCRIPTION - DESCRIPTORS: DESCRIPTORS: ACHING

## 2021-08-08 ASSESSMENT — PAIN DESCRIPTION - LOCATION: LOCATION: ABDOMEN

## 2021-08-08 ASSESSMENT — PAIN DESCRIPTION - ORIENTATION: ORIENTATION: LEFT;LOWER

## 2021-08-08 NOTE — PROGRESS NOTES
Pt resting with eyes closed. Respirations even and unlabored. Call light and belongings within reach.

## 2021-08-08 NOTE — PROGRESS NOTES
This nurse walked up to pod next to patient's room, waiting for AM nurse to finish in another room. Writer could hear patient cry in pain from hallway. Writer entered room and patient was crying and writhing in bed. Rates LLQ abdominal pain a 10 on a 0-10 pain scale. AM nurse notified and would like 4mg morphine to be given at this time.

## 2021-08-08 NOTE — PROGRESS NOTES
Pt tolerating clear liquid diet well. Given peanut butter crackers and dianna crackers at this time. Denies nausea.

## 2021-08-08 NOTE — DISCHARGE SUMMARY
Date of admission: August 7, 2021    Date of discharge: August 8, 2021    Admitting diagnosis: Appendicitis    Discharge diagnosis: Appendicitis status post appendectomy    Procedure: Laparoscopic appendectomy    Hospital summary: Patient was direct admitted from [place] with a diagnosis of appendicitis. Patient presented to an outlying emergency room. He had a CT scan at the outlying facility that confirmed acute appendicitis. Patient had intravenous fluids initiated. He was maintained n.p.o. status. He was taken to surgery later that day for laparoscopic appendectomy. Postoperatively he did well. Diet and activity were advanced as tolerated. He is discharged to home in good condition the morning after the day of surgery. He is discharged home with a prescription provided for as needed Vicodin. He is given instructions to follow-up in my office the week following discharge.

## 2021-08-08 NOTE — PLAN OF CARE
Problem: Pain:  Goal: Pain level will decrease  Description: Pain level will decrease  8/7/2021 2153 by Joceline Guzman RN  Outcome: Ongoing   Pt able to verbalize when in pain. States a 3 on a 0-10 pain scale. Medicated as ordered by physician. Will continue to monitor. Problem: Falls - Risk of:  Goal: Will remain free from falls  Description: Will remain free from falls  8/7/2021 2153 by Joceline Guzman RN  Outcome: Ongoing  Bed in lowest position. Wheels locked. 2/4 side rails up. Call light and belongings within reach. Problem: Infection - Surgical Site:  Goal: Will show no infection signs and symptoms  Description: Will show no infection signs and symptoms  8/7/2021 2153 by Joceline Guzman RN  Outcome: Ongoing  Pt is receiving Ancef.

## 2021-08-08 NOTE — PROGRESS NOTES
Pt rates LLQ a 3 on a 0-10 pain scale after morphine administration. Patient is satisfied with that rating at this time. Resting comfortably in bed with call light and belongings within reach.

## 2021-08-08 NOTE — PROGRESS NOTES
daily   Yes Historical Provider, MD   magnesium oxide (MAG-OX) 400 MG tablet Take 400 mg by mouth 2 times daily   Yes Historical Provider, MD   metFORMIN (GLUCOPHAGE) 500 MG tablet Take 500 mg by mouth nightly   Yes Historical Provider, MD   methocarbamol (ROBAXIN) 500 MG tablet Take 500 mg by mouth nightly   Yes Historical Provider, MD   metoprolol succinate (TOPROL XL) 50 MG extended release tablet Take 25 mg by mouth 2 times daily   Yes Historical Provider, MD   omeprazole (PRILOSEC) 40 MG delayed release capsule Take 40 mg by mouth daily   Yes Historical Provider, MD   pramipexole (MIRAPEX) 1.5 MG tablet Take 1.5 mg by mouth nightly   Yes Historical Provider, MD   pregabalin (LYRICA) 50 MG capsule Take 50 mg by mouth daily. Yes Historical Provider, MD   rosuvastatin (CRESTOR) 10 MG tablet Take 10 mg by mouth three times a week M-W-F   Yes Historical Provider, MD   Garlic 8759 MG CAPS Take 1 capsule by mouth nightly   Yes Historical Provider, MD       Review of Systems:  Constitutional:negative  for fevers, and negative for chills. Eyes: negative for visual disturbance   ENT: negative for sore throat, negative nasal congestion, and negative for earache  Respiratory: negative for shortness of breath, negative for cough, and negative for wheezing  Cardiovascular: negative for chest pain, negative for palpitations, and negative for syncope  Gastrointestinal: positive for abdominal pain, positive for nausea,negative for vomiting, negative for diarrhea, negative for constipation, and negative for hematochezia or melena  Genitourinary: negative for dysuria, negative for urinary urgency, negative for urinary frequency, and negative for hematuria  Skin: negative for skin rash, and negative for skin lesions  Neurological: negative for unilateral weakness, numbness or tingling.     Physical Exam:    Vitals:   Temp: 98.5 °F (36.9 °C)  BP: (!) 126/54  Resp: 18  Pulse: 73  SpO2: 96 %  24HR INTAKE/OUTPUT:      Intake/Output Summary (Last 24 hours) at 2021 0737  Last data filed at 2021 2820  Gross per 24 hour   Intake 2191.9 ml   Output 1600 ml   Net 591.9 ml       Exam:  GEN:  alert & appropriate appearance  EYES: normal eyes, non icteric  NECK: supple with no lymphadenopathy,  no bruits noted  PULM: clear with no rales or rhonchi. No wheezes  COR: regular with rate and rhythm  ABD:  Minimal tenderness right lower quadrant  EXT:   no edema noted  NEURO: follows commands, MASTERS, no deficits  SKIN:  negative  -----------------------------------------------------------------  Diagnostic Data:   Lab Results   Component Value Date    WBC 7.1 2021    HGB 9.3 (L) 2021     2021       Lab Results   Component Value Date    BUN 35 (H) 2021    CREATININE 1.13 2021     2021    K 4.3 2021    CALCIUM 9.0 2021     2021    CO2 25 2021    LABGLOM >60 2021       No results found for: Jeppie Ascencion, EPITHUA, LEUKOCYTESUR, SPECGRAV, GLUCOSEU, KETUA, PROTEINU, HGBUR, CASTUA, CRYSTUA, BACTERIA, YEAST    No results found for: MYOGLOBIN, TROPONINT, CKTOTAL, CKMB, PROBNP    No results found. Assessment:    Active Problems:    Appendicitis    Acute appendicitis  Resolved Problems:    * No resolved hospital problems. *      Patient Active Problem List    Diagnosis Date Noted    Appendicitis 2021    Acute appendicitis 2021       Plan:     · This patient requires inpatient admission because of acute appendicitis  · Factors affecting the medical complexity of this patient include Active Problems:  ·   Appendicitis  ·   Acute appendicitis  · Resolved Problems:  ·   * No resolved hospital problems. *  ·   · Estimated length of stay is 2 days  · Surgery already performed. Patient doing well. Likely discharge later today.   · High risk medication monitorin    CORE MEASURES  Core measures including DVT prophylaxis, Code Status, Nutrition, Therapy Options, chart reviewed and advance directives reviewed at this visit.     Volodymyr Hines MD, MD  8/8/2021, 7:37 AM

## 2021-08-10 LAB — SURGICAL PATHOLOGY REPORT: NORMAL

## 2021-08-12 PROBLEM — M17.11 PRIMARY OSTEOARTHRITIS OF RIGHT KNEE: Status: ACTIVE | Noted: 2021-05-25

## 2021-08-12 PROBLEM — M50.20 DISPLACEMENT OF CERVICAL INTERVERTEBRAL DISC WITHOUT MYELOPATHY: Status: ACTIVE | Noted: 2021-08-12

## 2021-08-12 PROBLEM — M54.2 CERVICALGIA: Status: ACTIVE | Noted: 2021-07-07

## 2021-08-12 PROBLEM — G56.00 CARPAL TUNNEL SYNDROME: Status: ACTIVE | Noted: 2018-02-09

## 2021-08-12 PROBLEM — M54.12 CERVICAL RADICULOPATHY: Status: ACTIVE | Noted: 2021-08-12

## 2022-04-19 ENCOUNTER — HOSPITAL ENCOUNTER (OUTPATIENT)
Age: 62
Discharge: HOME | End: 2022-04-19
Payer: COMMERCIAL

## 2022-04-19 ENCOUNTER — HOSPITAL ENCOUNTER (OUTPATIENT)
Dept: GENERAL RADIOLOGY | Age: 62
Discharge: HOME | End: 2022-04-21
Payer: COMMERCIAL

## 2022-04-19 ENCOUNTER — HOSPITAL ENCOUNTER (OUTPATIENT)
Age: 62
Discharge: HOME | End: 2022-04-21
Payer: COMMERCIAL

## 2022-04-19 ENCOUNTER — OFFICE VISIT (OUTPATIENT)
Dept: PRIMARY CARE CLINIC | Age: 62
End: 2022-04-19
Payer: COMMERCIAL

## 2022-04-19 VITALS
SYSTOLIC BLOOD PRESSURE: 137 MMHG | HEART RATE: 68 BPM | DIASTOLIC BLOOD PRESSURE: 66 MMHG | WEIGHT: 226.6 LBS | BODY MASS INDEX: 33.56 KG/M2 | HEIGHT: 69 IN

## 2022-04-19 DIAGNOSIS — E53.8 VITAMIN B12 DEFICIENCY: ICD-10-CM

## 2022-04-19 DIAGNOSIS — E78.5 HYPERLIPIDEMIA, UNSPECIFIED HYPERLIPIDEMIA TYPE: ICD-10-CM

## 2022-04-19 DIAGNOSIS — M62.838 MUSCLE SPASM: ICD-10-CM

## 2022-04-19 DIAGNOSIS — I25.10 CORONARY ARTERY DISEASE WITHOUT ANGINA PECTORIS, UNSPECIFIED VESSEL OR LESION TYPE, UNSPECIFIED WHETHER NATIVE OR TRANSPLANTED HEART: ICD-10-CM

## 2022-04-19 DIAGNOSIS — G89.29 CHRONIC LEFT SHOULDER PAIN: ICD-10-CM

## 2022-04-19 DIAGNOSIS — M54.42 CHRONIC BILATERAL LOW BACK PAIN WITH BILATERAL SCIATICA: ICD-10-CM

## 2022-04-19 DIAGNOSIS — G25.81 RESTLESS LEG SYNDROME: ICD-10-CM

## 2022-04-19 DIAGNOSIS — G89.29 CHRONIC RIGHT SHOULDER PAIN: ICD-10-CM

## 2022-04-19 DIAGNOSIS — I10 PRIMARY HYPERTENSION: ICD-10-CM

## 2022-04-19 DIAGNOSIS — M1A.9XX0 CHRONIC GOUT WITHOUT TOPHUS, UNSPECIFIED CAUSE, UNSPECIFIED SITE: ICD-10-CM

## 2022-04-19 DIAGNOSIS — G89.29 CHRONIC NECK PAIN: ICD-10-CM

## 2022-04-19 DIAGNOSIS — M25.511 CHRONIC RIGHT SHOULDER PAIN: ICD-10-CM

## 2022-04-19 DIAGNOSIS — M25.512 CHRONIC LEFT SHOULDER PAIN: ICD-10-CM

## 2022-04-19 DIAGNOSIS — E11.69 TYPE 2 DIABETES MELLITUS WITH OTHER SPECIFIED COMPLICATION, WITHOUT LONG-TERM CURRENT USE OF INSULIN (HCC): ICD-10-CM

## 2022-04-19 DIAGNOSIS — E55.9 HYPOVITAMINOSIS D: ICD-10-CM

## 2022-04-19 DIAGNOSIS — M54.2 CHRONIC NECK PAIN: ICD-10-CM

## 2022-04-19 DIAGNOSIS — K21.9 GASTROESOPHAGEAL REFLUX DISEASE, UNSPECIFIED WHETHER ESOPHAGITIS PRESENT: ICD-10-CM

## 2022-04-19 DIAGNOSIS — G89.29 CHRONIC BILATERAL LOW BACK PAIN WITH BILATERAL SCIATICA: ICD-10-CM

## 2022-04-19 DIAGNOSIS — M79.89 LEG SWELLING: ICD-10-CM

## 2022-04-19 DIAGNOSIS — M54.41 CHRONIC BILATERAL LOW BACK PAIN WITH BILATERAL SCIATICA: ICD-10-CM

## 2022-04-19 DIAGNOSIS — M79.671 RIGHT FOOT PAIN: Primary | ICD-10-CM

## 2022-04-19 LAB
ABSOLUTE EOS #: 0.06 K/UL (ref 0–0.44)
ABSOLUTE IMMATURE GRANULOCYTE: <0.03 K/UL (ref 0–0.3)
ABSOLUTE LYMPH #: 0.75 K/UL (ref 1.1–3.7)
ABSOLUTE MONO #: 0.36 K/UL (ref 0.1–1.2)
ABSOLUTE RETIC #: 0.04 M/UL (ref 0.03–0.08)
ALBUMIN SERPL-MCNC: 4.6 G/DL (ref 3.5–5.2)
ALBUMIN/GLOBULIN RATIO: 1.9 (ref 1–2.5)
ALP BLD-CCNC: 93 U/L (ref 40–129)
ALT SERPL-CCNC: 30 U/L (ref 5–41)
ANION GAP SERPL CALCULATED.3IONS-SCNC: 9 MMOL/L (ref 9–17)
AST SERPL-CCNC: 22 U/L
BASOPHILS # BLD: 1 % (ref 0–2)
BASOPHILS ABSOLUTE: <0.03 K/UL (ref 0–0.2)
BILIRUB SERPL-MCNC: 0.46 MG/DL (ref 0.3–1.2)
BILIRUBIN DIRECT: <0.08 MG/DL
BILIRUBIN, INDIRECT: ABNORMAL MG/DL (ref 0–1)
BUN BLDV-MCNC: 34 MG/DL (ref 8–23)
CALCIUM SERPL-MCNC: 9.6 MG/DL (ref 8.6–10.4)
CHLORIDE BLD-SCNC: 104 MMOL/L (ref 98–107)
CO2: 27 MMOL/L (ref 20–31)
CREAT SERPL-MCNC: 0.71 MG/DL (ref 0.7–1.2)
EOSINOPHILS RELATIVE PERCENT: 1 % (ref 1–4)
GFR AFRICAN AMERICAN: >60 ML/MIN
GFR NON-AFRICAN AMERICAN: >60 ML/MIN
GFR SERPL CREATININE-BSD FRML MDRD: ABNORMAL ML/MIN/{1.73_M2}
GFR SERPL CREATININE-BSD FRML MDRD: ABNORMAL ML/MIN/{1.73_M2}
GLUCOSE BLD-MCNC: 107 MG/DL (ref 70–99)
HCT VFR BLD CALC: 39.7 % (ref 40.7–50.3)
HEMOGLOBIN: 13 G/DL (ref 13–17)
IMMATURE GRANULOCYTES: 0 %
IMMATURE RETIC FRACT: 13.1 % (ref 2.7–18.3)
LYMPHOCYTES # BLD: 17 % (ref 24–43)
MCH RBC QN AUTO: 30.4 PG (ref 25.2–33.5)
MCHC RBC AUTO-ENTMCNC: 32.7 G/DL (ref 28.4–34.8)
MCV RBC AUTO: 92.8 FL (ref 82.6–102.9)
MONOCYTES # BLD: 8 % (ref 3–12)
NRBC AUTOMATED: 0 PER 100 WBC
PDW BLD-RTO: 12.8 % (ref 11.8–14.4)
PLATELET # BLD: 200 K/UL (ref 138–453)
PMV BLD AUTO: 8.9 FL (ref 8.1–13.5)
POTASSIUM SERPL-SCNC: 4.9 MMOL/L (ref 3.7–5.3)
RBC # BLD: 4.28 M/UL (ref 4.21–5.77)
RETIC %: 1 % (ref 0.5–1.9)
RETIC HEMOGLOBIN: 34.7 PG (ref 28.2–35.7)
SEG NEUTROPHILS: 73 % (ref 36–65)
SEGMENTED NEUTROPHILS ABSOLUTE COUNT: 3.19 K/UL (ref 1.5–8.1)
SODIUM BLD-SCNC: 140 MMOL/L (ref 135–144)
TOTAL PROTEIN: 7 G/DL (ref 6.4–8.3)
TSH SERPL DL<=0.05 MIU/L-ACNC: 3.01 UIU/ML (ref 0.3–5)
URIC ACID: 5.9 MG/DL (ref 3.4–7)
WBC # BLD: 4.4 K/UL (ref 3.5–11.3)

## 2022-04-19 PROCEDURE — 82306 VITAMIN D 25 HYDROXY: CPT

## 2022-04-19 PROCEDURE — 82728 ASSAY OF FERRITIN: CPT

## 2022-04-19 PROCEDURE — 83550 IRON BINDING TEST: CPT

## 2022-04-19 PROCEDURE — 82248 BILIRUBIN DIRECT: CPT

## 2022-04-19 PROCEDURE — 99204 OFFICE O/P NEW MOD 45 MIN: CPT | Performed by: STUDENT IN AN ORGANIZED HEALTH CARE EDUCATION/TRAINING PROGRAM

## 2022-04-19 PROCEDURE — 3044F HG A1C LEVEL LT 7.0%: CPT | Performed by: STUDENT IN AN ORGANIZED HEALTH CARE EDUCATION/TRAINING PROGRAM

## 2022-04-19 PROCEDURE — 85025 COMPLETE CBC W/AUTO DIFF WBC: CPT

## 2022-04-19 PROCEDURE — 83540 ASSAY OF IRON: CPT

## 2022-04-19 PROCEDURE — 73030 X-RAY EXAM OF SHOULDER: CPT

## 2022-04-19 PROCEDURE — 84550 ASSAY OF BLOOD/URIC ACID: CPT

## 2022-04-19 PROCEDURE — 82746 ASSAY OF FOLIC ACID SERUM: CPT

## 2022-04-19 PROCEDURE — 85045 AUTOMATED RETICULOCYTE COUNT: CPT

## 2022-04-19 PROCEDURE — 36415 COLL VENOUS BLD VENIPUNCTURE: CPT

## 2022-04-19 PROCEDURE — 93005 ELECTROCARDIOGRAM TRACING: CPT

## 2022-04-19 PROCEDURE — 82607 VITAMIN B-12: CPT

## 2022-04-19 PROCEDURE — 84443 ASSAY THYROID STIM HORMONE: CPT

## 2022-04-19 PROCEDURE — 80053 COMPREHEN METABOLIC PANEL: CPT

## 2022-04-19 PROCEDURE — 80061 LIPID PANEL: CPT

## 2022-04-19 PROCEDURE — 83036 HEMOGLOBIN GLYCOSYLATED A1C: CPT

## 2022-04-19 RX ORDER — ALLOPURINOL 100 MG/1
100 TABLET ORAL 4 TIMES DAILY
Qty: 90 TABLET | Refills: 0 | Status: SHIPPED | OUTPATIENT
Start: 2022-04-19 | End: 2022-04-20

## 2022-04-19 RX ORDER — ALLOPURINOL 100 MG/1
100 TABLET ORAL 4 TIMES DAILY
COMMUNITY
End: 2022-04-19 | Stop reason: SDUPTHER

## 2022-04-19 RX ORDER — ATORVASTATIN CALCIUM 40 MG/1
40 TABLET, FILM COATED ORAL DAILY
Qty: 30 TABLET | Refills: 0 | Status: CANCELLED | OUTPATIENT
Start: 2022-04-19

## 2022-04-19 RX ORDER — ROSUVASTATIN CALCIUM 20 MG/1
20 TABLET, COATED ORAL DAILY
COMMUNITY

## 2022-04-19 RX ORDER — OMEPRAZOLE 20 MG/1
20 CAPSULE, DELAYED RELEASE ORAL DAILY
COMMUNITY

## 2022-04-19 RX ORDER — DULOXETIN HYDROCHLORIDE 60 MG/1
60 CAPSULE, DELAYED RELEASE ORAL DAILY
COMMUNITY

## 2022-04-19 RX ORDER — METHOCARBAMOL 500 MG/1
500 TABLET, FILM COATED ORAL NIGHTLY
Qty: 60 TABLET | Refills: 0 | Status: SHIPPED | OUTPATIENT
Start: 2022-04-19

## 2022-04-19 SDOH — ECONOMIC STABILITY: TRANSPORTATION INSECURITY
IN THE PAST 12 MONTHS, HAS THE LACK OF TRANSPORTATION KEPT YOU FROM MEDICAL APPOINTMENTS OR FROM GETTING MEDICATIONS?: NO

## 2022-04-19 SDOH — ECONOMIC STABILITY: TRANSPORTATION INSECURITY
IN THE PAST 12 MONTHS, HAS LACK OF TRANSPORTATION KEPT YOU FROM MEETINGS, WORK, OR FROM GETTING THINGS NEEDED FOR DAILY LIVING?: NO

## 2022-04-19 SDOH — ECONOMIC STABILITY: FOOD INSECURITY: WITHIN THE PAST 12 MONTHS, THE FOOD YOU BOUGHT JUST DIDN'T LAST AND YOU DIDN'T HAVE MONEY TO GET MORE.: NEVER TRUE

## 2022-04-19 SDOH — ECONOMIC STABILITY: FOOD INSECURITY: WITHIN THE PAST 12 MONTHS, YOU WORRIED THAT YOUR FOOD WOULD RUN OUT BEFORE YOU GOT MONEY TO BUY MORE.: NEVER TRUE

## 2022-04-19 ASSESSMENT — PATIENT HEALTH QUESTIONNAIRE - PHQ9
2. FEELING DOWN, DEPRESSED OR HOPELESS: 2
1. LITTLE INTEREST OR PLEASURE IN DOING THINGS: 0
SUM OF ALL RESPONSES TO PHQ QUESTIONS 1-9: 2
SUM OF ALL RESPONSES TO PHQ QUESTIONS 1-9: 2
SUM OF ALL RESPONSES TO PHQ9 QUESTIONS 1 & 2: 2
SUM OF ALL RESPONSES TO PHQ QUESTIONS 1-9: 2
SUM OF ALL RESPONSES TO PHQ QUESTIONS 1-9: 2

## 2022-04-19 ASSESSMENT — SOCIAL DETERMINANTS OF HEALTH (SDOH): HOW HARD IS IT FOR YOU TO PAY FOR THE VERY BASICS LIKE FOOD, HOUSING, MEDICAL CARE, AND HEATING?: NOT HARD AT ALL

## 2022-04-19 NOTE — PATIENT INSTRUCTIONS
SURVEY:    You may be receiving a survey from [employer] regarding your visit today. You may get this in the mail, through your MyChart, or in your email. Please complete the survey to enable us to provide the highest quality of care to you and your family. If you cannot score us a very good (5 Stars) on any question, please call the office to discuss how we could of made your experience exceptional.    Thank you!     Dr. Mingo Fortes Dr. Deri Schirmer, JUAN C Camacho, [address] [place] [address] Lorenzo Ornelas    Phone: [phone]  Fax: [phone]    Office Hours:   Hanh Amato, F: 8-5 Wednesday: 9-11
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
## 2022-04-20 DIAGNOSIS — M25.511 CHRONIC RIGHT SHOULDER PAIN: ICD-10-CM

## 2022-04-20 DIAGNOSIS — G89.29 CHRONIC LEFT SHOULDER PAIN: ICD-10-CM

## 2022-04-20 DIAGNOSIS — M25.512 CHRONIC LEFT SHOULDER PAIN: ICD-10-CM

## 2022-04-20 DIAGNOSIS — E78.1 HYPERTRIGLYCERIDEMIA: Primary | ICD-10-CM

## 2022-04-20 DIAGNOSIS — M1A.9XX0 CHRONIC GOUT WITHOUT TOPHUS, UNSPECIFIED CAUSE, UNSPECIFIED SITE: ICD-10-CM

## 2022-04-20 DIAGNOSIS — G89.29 CHRONIC RIGHT SHOULDER PAIN: ICD-10-CM

## 2022-04-20 PROBLEM — K21.9 GASTROESOPHAGEAL REFLUX DISEASE: Status: ACTIVE | Noted: 2022-04-20

## 2022-04-20 PROBLEM — M62.838 MUSCLE SPASM: Status: ACTIVE | Noted: 2022-04-20

## 2022-04-20 PROBLEM — M54.2 CHRONIC NECK PAIN: Status: ACTIVE | Noted: 2022-04-20

## 2022-04-20 PROBLEM — G25.81 RESTLESS LEG SYNDROME: Status: ACTIVE | Noted: 2022-04-20

## 2022-04-20 PROBLEM — I25.10 CORONARY ARTERY DISEASE WITHOUT ANGINA PECTORIS: Status: ACTIVE | Noted: 2022-04-20

## 2022-04-20 PROBLEM — E78.5 HYPERLIPIDEMIA: Status: ACTIVE | Noted: 2022-04-20

## 2022-04-20 PROBLEM — M54.41 CHRONIC BILATERAL LOW BACK PAIN WITH BILATERAL SCIATICA: Status: ACTIVE | Noted: 2022-04-20

## 2022-04-20 PROBLEM — E55.9 HYPOVITAMINOSIS D: Status: ACTIVE | Noted: 2022-04-20

## 2022-04-20 PROBLEM — M54.42 CHRONIC BILATERAL LOW BACK PAIN WITH BILATERAL SCIATICA: Status: ACTIVE | Noted: 2022-04-20

## 2022-04-20 PROBLEM — M79.671 RIGHT FOOT PAIN: Status: ACTIVE | Noted: 2022-04-20

## 2022-04-20 PROBLEM — E53.8 VITAMIN B12 DEFICIENCY: Status: ACTIVE | Noted: 2022-04-20

## 2022-04-20 PROBLEM — I10 PRIMARY HYPERTENSION: Status: ACTIVE | Noted: 2022-04-20

## 2022-04-20 PROBLEM — E11.9 DIABETES MELLITUS (HCC): Status: ACTIVE | Noted: 2022-04-20

## 2022-04-20 PROBLEM — M79.89 LEG SWELLING: Status: ACTIVE | Noted: 2022-04-20

## 2022-04-20 LAB
CHOLESTEROL/HDL RATIO: 5
CHOLESTEROL: 170 MG/DL
EKG ATRIAL RATE: 68 BPM
EKG P AXIS: 59 DEGREES
EKG P-R INTERVAL: 184 MS
EKG Q-T INTERVAL: 390 MS
EKG QRS DURATION: 104 MS
EKG QTC CALCULATION (BAZETT): 414 MS
EKG R AXIS: 44 DEGREES
EKG T AXIS: 36 DEGREES
EKG VENTRICULAR RATE: 68 BPM
ESTIMATED AVERAGE GLUCOSE: 140 MG/DL
FERRITIN: 117 NG/ML (ref 30–400)
FOLATE: >20 NG/ML
HBA1C MFR BLD: 6.5 % (ref 4–6)
HDLC SERPL-MCNC: 34 MG/DL
IRON SATURATION: 24 % (ref 20–55)
IRON: 77 UG/DL (ref 59–158)
LDL CHOLESTEROL: 84 MG/DL (ref 0–130)
SURGICAL PATHOLOGY REPORT: NORMAL
TOTAL IRON BINDING CAPACITY: 326 UG/DL (ref 250–450)
TRIGL SERPL-MCNC: 261 MG/DL
UNSATURATED IRON BINDING CAPACITY: 249 UG/DL (ref 112–347)
VITAMIN B-12: 707 PG/ML (ref 232–1245)
VITAMIN D 25-HYDROXY: 47.3 NG/ML

## 2022-04-20 PROCEDURE — 93010 ELECTROCARDIOGRAM REPORT: CPT | Performed by: INTERNAL MEDICINE

## 2022-04-20 RX ORDER — FENOFIBRATE 160 MG/1
160 TABLET ORAL DAILY
Qty: 90 TABLET | Refills: 0 | Status: SHIPPED | OUTPATIENT
Start: 2022-04-20

## 2022-04-20 RX ORDER — ALLOPURINOL 300 MG/1
300 TABLET ORAL 2 TIMES DAILY
Qty: 180 TABLET | Refills: 0 | Status: SHIPPED | OUTPATIENT
Start: 2022-04-20 | End: 2022-04-25 | Stop reason: SDUPTHER

## 2022-04-21 ENCOUNTER — TELEPHONE (OUTPATIENT)
Dept: PRIMARY CARE CLINIC | Age: 62
End: 2022-04-21

## 2022-04-21 DIAGNOSIS — G89.29 CHRONIC RIGHT SHOULDER PAIN: Primary | ICD-10-CM

## 2022-04-21 DIAGNOSIS — M25.512 CHRONIC LEFT SHOULDER PAIN: ICD-10-CM

## 2022-04-21 DIAGNOSIS — G89.29 CHRONIC LEFT SHOULDER PAIN: ICD-10-CM

## 2022-04-21 DIAGNOSIS — M25.511 CHRONIC RIGHT SHOULDER PAIN: Primary | ICD-10-CM

## 2022-04-21 LAB — PATHOLOGIST REVIEW: NORMAL

## 2022-04-21 NOTE — TELEPHONE ENCOUNTER
Patient informed. Patient would like referral to Dr. eHide Webb, not Dr. Sarah Murry. Referral faxed to Dr. Heide Webb.

## 2022-04-21 NOTE — TELEPHONE ENCOUNTER
----- Message from Maureen Stewart MD sent at 4/20/2022  7:52 PM EDT -----  Please let the patient know that:  1) As the A1c is 6.5 - he can continue w/ lifestyle changes/modifications and if his A1c remains at 6.5/7.0 and lower that he can remain Off metformin  2) His Uric acid is at 5.9, goal is to have Uric Acid levels below 6.0. He is on 400mg, we will increase the dose of the Allopurinol. 3) His lipid panel noted a high triglycerides, we will need to increase the Fenofibrate and plan to recheck it. 4) His shoulder Xrays noted mild degenerative changes and the left shoulder noted a 3mm 'loose body\" which sometimes occurs. We will need him evaluated further by Orthopedic Surgery  We will discuss these further at his next upcoming appointment as well, thank you.   Electronically signed by Maureen Stewart MD on 4/20/2022 at 7:51 PM

## 2022-04-22 ENCOUNTER — HOSPITAL ENCOUNTER (OUTPATIENT)
Dept: PHYSICAL THERAPY | Age: 62
Setting detail: THERAPIES SERIES
Discharge: HOME | End: 2022-04-22
Payer: COMMERCIAL

## 2022-04-22 PROCEDURE — 97162 PT EVAL MOD COMPLEX 30 MIN: CPT

## 2022-04-22 NOTE — PROGRESS NOTES
ADduction: 4/5  L Elbow Flexion: 4+/5       Special tests: (+) Hawkin's Jeremy, bilaterally    Exercises:  Exercise 1: HEP: UT stretch, shrugs, retracts      Modalities:    MHP x10min UT/cervical    Functional Outcome Measures      Self-reported Disability= 100%         Assessment  Assessment: Pt is a 61 y/o male who presents to PT with chronic B shoulder pain. Pt currently demonstrates TTP through B UT, decreased B shld AROM (pain-limiting), decreased BUE and scapular strength, and decreased overall functional mobility. Pt will benefit from skilled PT services to address the above impairments and to work toward the established functional goals. Therapy Prognosis: Good        Decision Making: Medium Complexity    Patient Education  *Patient Education: PT POC and HEP  Pt verbalized/demonstrated good understanding:     [X] Yes         [] No, pt required further clarification. Goals  Short Term Goals  Time Frame for Short term goals: 3 weeks  Short term goal 1: Pt will initiate HEP. Short term goal 2: Pt will be instructed in scapular strengthening to assist with decreasing BUE pain. Long Term Goals  Time Frame for Long term goals : 6 weeks  Long term goal 1: Pt will be independent and compliant with HEP. Long term goal 2: Pt will improve B shoulder AROM to WNL in all major planes with minimal pain for ease of reaching overhead. Long term goal 3: Pt will improve R shld flex, ABD and ER strength to >/= 4 to 4+/5 for ease of lifting/carrying tasks. Long term goal 4: Pt will report >/= 50% improvement in pain and overall function to improve QOL.       Patient goals : Decrease pain        Minutes Tracking:  Time In: 0940  Time Out: 2495  Minutes: 43  Timed Code Treatment Minutes: Chinmay Wong, [place], DPT    4/22/2022

## 2022-04-22 NOTE — PLAN OF CARE
[facility]           Phone: [phone]             Outpatient Physical Therapy  Fax: [phone]                                           Date: 2022  Patient: Humaira Tariq : 1960 CSN #: [id number]   Referring Practitioner:   Landon Noel MD  Referral Date:      22    [x] Plan of Care   [] Updated Plan of Care    Dates of Service to Include: 2022 to 22    Diagnosis:  Chronic R shoulder pain (M25.511), Chronic L shoulder pain (M25.512) G89.29    Rehab (Treatment) Diagnosis:  B shoulder pain             Onset Date:  22    Attendance  Total # of Visits to Date: 1 No Show: 0 Canceled Appointment: 0    Assessment  Assessment: Pt is a 59 y/o male who presents to PT with chronic B shoulder pain. Pt currently demonstrates TTP through B UT, decreased B shld AROM (pain-limiting), decreased BUE and scapular strength, and decreased overall functional mobility. Pt will benefit from skilled PT services to address the above impairments and to work toward the established functional goals. Goals  Short Term Goals  Time Frame for Short term goals: 3 weeks  Short term goal 1: Pt will initiate HEP. Short term goal 2: Pt will be instructed in scapular strengthening to assist with decreasing BUE pain. Long Term Goals  Time Frame for Long term goals : 6 weeks  Long term goal 1: Pt will be independent and compliant with HEP. Long term goal 2: Pt will improve B shoulder AROM to WNL in all major planes with minimal pain for ease of reaching overhead. Long term goal 3: Pt will improve R shld flex, ABD and ER strength to >/= 4 to 4+/5 for ease of lifting/carrying tasks. Long term goal 4: Pt will report >/= 50% improvement in pain and overall function to improve QOL.      Prognosis  Therapy Prognosis: Good    Treatment Plan   Plan  Plan weeks: 6       Frequency: 2x/wk     [x] HP/CP      [] Electrical Stim   [x] Therapeutic Exercise      [] Gait Training  [] Aquatics   [] Ultrasound         [x] Patient Education/HEP   [x] Manual Therapy  [] Traction    [] Neuro-russell        [x] Soft Tissue Mobs            [] Home TENS  [] Iontophoresis    [] Orthotic casting/fitting      [] Dry Needling             Electronically signed by: Phani Fagan PT, DPTREVOR    Date: 4/22/2022      ______________________________________ Date: 4/22/2022   Physician Signature

## 2022-04-25 ENCOUNTER — TELEPHONE (OUTPATIENT)
Dept: PRIMARY CARE CLINIC | Age: 62
End: 2022-04-25

## 2022-04-25 DIAGNOSIS — M1A.9XX0 CHRONIC GOUT WITHOUT TOPHUS, UNSPECIFIED CAUSE, UNSPECIFIED SITE: ICD-10-CM

## 2022-04-25 RX ORDER — ALLOPURINOL 300 MG/1
300 TABLET ORAL 2 TIMES DAILY
Qty: 180 TABLET | Refills: 0 | Status: SHIPPED | OUTPATIENT
Start: 2022-04-25

## 2022-04-25 NOTE — TELEPHONE ENCOUNTER
[facility] in [place] called this morning for clarification on his prescription. The medication is Allopurinol. The instructions states 1 tablet by mouth two times daily then goes into staying 2day/2night.

## 2022-04-25 NOTE — TELEPHONE ENCOUNTER
Hello, I sent a new prescription. The patient had been using that prescription prior, I made some dose adjustments, thank you.   Electronically signed by Allyn Grey MD on 4/25/2022 at 9:32 AM

## 2022-04-29 ENCOUNTER — HOSPITAL ENCOUNTER (OUTPATIENT)
Dept: PHYSICAL THERAPY | Age: 62
Setting detail: THERAPIES SERIES
Discharge: HOME | End: 2022-04-29
Payer: COMMERCIAL

## 2022-04-29 PROCEDURE — 97140 MANUAL THERAPY 1/> REGIONS: CPT

## 2022-04-29 PROCEDURE — 97110 THERAPEUTIC EXERCISES: CPT

## 2022-04-29 NOTE — PROGRESS NOTES
Phone: Taran           Fax: [phone]                           Outpatient Physical Therapy                                                                            Daily Note    Patient: Delia Henry : 1960  CSN #: [id number]   Referring Physician: Prasanth Boyce MD    Date: 2022    Diagnosis: Chronic R shoulder pain (M25.511), Chronic L shoulder pain (M25.512) G89.29  Treatment Diagnosis: B shoulder pain    Onset Date: 22  Total # of Visits Approved: 12 Per Physician Order  Total # of Visits to Date: 2  No Show: 0  Canceled Appointment: 0      Pre-Treatment Pain:  2/10  Subjective: Pt states pain level 2/10 upon arrival this date. Pt states he has done HEP but UT stretch makes his back hurt. Exercises:  Exercise 2: UBE 3'/3'  Exercise 3: Rows/ext BTB x15ea  Exercise 4: Shrugs/retro rolls/retracts 2# [id number]  Exercise 5: Pulleys x4mins  Exercise 6: Standing cane flex and ABD x10ea    Manual:  Soft Tissue Mobilizaton: STM to L UT and bilateral cervical paraspinals    Modalities:   MHP x15min cervical        Assessment  Assessment: Reviewed HEP and instructed pt to discontinue UT stretch if getting increased back pain. Initiated multiple UE and scapular strengthening exercises this date within pt tolerance. Manual STM focusing primarily on L-sided cervical paraspinals and UT. MHP applied at end of tx for pain. Will progress as tolerated. Activity Tolerance  Activity Tolerance: Patient tolerated treatment well    Patient Education  Patient Education: Ex progressions  Pt verbalized/demonstrated good understanding:     [x] Yes         [] No, pt required further clarification. Post Treatment Pain:  2/10      Plan  Plan Frequency: 2x/wk  Plan weeks: 6       Goals  (Total # of Visits to Date: 2)      Short Term Goals  Time Frame for Short term goals: 3 weeks  Short term goal 1: Pt will initiate HEP. - met  Short term goal 2: Pt will be instructed in scapular strengthening to assist with decreasing BUE pain. - met/cont    Long Term Goals  Time Frame for Long term goals : 6 weeks  Long term goal 1: Pt will be independent and compliant with HEP. Long term goal 2: Pt will improve B shoulder AROM to WNL in all major planes with minimal pain for ease of reaching overhead. Long term goal 3: Pt will improve R shld flex, ABD and ER strength to >/= 4 to 4+/5 for ease of lifting/carrying tasks. Long term goal 4: Pt will report >/= 50% improvement in pain and overall function to improve QOL.     Minutes Tracking:  Time In: 1059  Time Out: 4437  Minutes: 58  Timed Code Treatment Minutes: [address], [address] [facility], [facility]      Date: 4/29/2022

## 2022-05-02 ENCOUNTER — HOSPITAL ENCOUNTER (OUTPATIENT)
Age: 62
Discharge: HOME | End: 2022-05-02
Payer: COMMERCIAL

## 2022-05-02 ENCOUNTER — OFFICE VISIT (OUTPATIENT)
Dept: CARDIOLOGY | Age: 62
End: 2022-05-02
Payer: COMMERCIAL

## 2022-05-02 ENCOUNTER — HOSPITAL ENCOUNTER (OUTPATIENT)
Dept: NON INVASIVE DIAGNOSTICS | Age: 62
Discharge: HOME | End: 2022-05-02
Payer: COMMERCIAL

## 2022-05-02 VITALS
WEIGHT: 223.8 LBS | BODY MASS INDEX: 33.15 KG/M2 | DIASTOLIC BLOOD PRESSURE: 61 MMHG | RESPIRATION RATE: 18 BRPM | OXYGEN SATURATION: 94 % | HEIGHT: 69 IN | HEART RATE: 73 BPM | SYSTOLIC BLOOD PRESSURE: 125 MMHG

## 2022-05-02 DIAGNOSIS — Z95.1 HISTORY OF CORONARY ARTERY BYPASS GRAFT X 3: Primary | ICD-10-CM

## 2022-05-02 DIAGNOSIS — I25.10 3-VESSEL CAD: ICD-10-CM

## 2022-05-02 DIAGNOSIS — R06.02 SHORTNESS OF BREATH: ICD-10-CM

## 2022-05-02 DIAGNOSIS — Z87.891 HISTORY OF CIGARETTE SMOKING: ICD-10-CM

## 2022-05-02 DIAGNOSIS — E78.2 MIXED HYPERLIPIDEMIA: ICD-10-CM

## 2022-05-02 DIAGNOSIS — Z95.1 HISTORY OF CORONARY ARTERY BYPASS GRAFT X 3: ICD-10-CM

## 2022-05-02 DIAGNOSIS — I10 PRIMARY HYPERTENSION: ICD-10-CM

## 2022-05-02 DIAGNOSIS — R55 SYNCOPE, UNSPECIFIED SYNCOPE TYPE: ICD-10-CM

## 2022-05-02 DIAGNOSIS — M1A.9XX0 CHRONIC GOUT WITHOUT TOPHUS, UNSPECIFIED CAUSE, UNSPECIFIED SITE: Primary | ICD-10-CM

## 2022-05-02 DIAGNOSIS — R42 DIZZY: ICD-10-CM

## 2022-05-02 DIAGNOSIS — M79.671 RIGHT FOOT PAIN: ICD-10-CM

## 2022-05-02 DIAGNOSIS — R42 LIGHTHEADED: ICD-10-CM

## 2022-05-02 LAB
CHOLESTEROL/HDL RATIO: 4.3
CHOLESTEROL: 158 MG/DL
HDLC SERPL-MCNC: 37 MG/DL
LDL CHOLESTEROL: 89 MG/DL (ref 0–130)
TRIGL SERPL-MCNC: 162 MG/DL

## 2022-05-02 PROCEDURE — 93243 EXT ECG>48HR<7D SCAN A/R: CPT

## 2022-05-02 PROCEDURE — 99205 OFFICE O/P NEW HI 60 MIN: CPT | Performed by: PHYSICIAN ASSISTANT

## 2022-05-02 PROCEDURE — 93242 EXT ECG>48HR<7D RECORDING: CPT

## 2022-05-02 PROCEDURE — 80061 LIPID PANEL: CPT

## 2022-05-02 PROCEDURE — 36415 COLL VENOUS BLD VENIPUNCTURE: CPT

## 2022-05-02 RX ORDER — EZETIMIBE 10 MG/1
10 TABLET ORAL DAILY
Qty: 90 TABLET | Refills: 1 | Status: SHIPPED | OUTPATIENT
Start: 2022-05-02

## 2022-05-02 NOTE — PATIENT INSTRUCTIONS
SURVEY:    You may be receiving a survey from [employer] regarding your visit today. Please complete the survey to enable us to provide the highest quality of care to you and your family. If you cannot score us a very good on any question, please call the office to discuss how we could have made your experience a very good one. Thank you.
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
## 2022-05-03 ENCOUNTER — HOSPITAL ENCOUNTER (OUTPATIENT)
Dept: PHYSICAL THERAPY | Age: 62
Setting detail: THERAPIES SERIES
Discharge: HOME | End: 2022-05-03
Payer: COMMERCIAL

## 2022-05-03 ENCOUNTER — TELEPHONE (OUTPATIENT)
Dept: CARDIOLOGY | Age: 62
End: 2022-05-03

## 2022-05-03 ENCOUNTER — OFFICE VISIT (OUTPATIENT)
Dept: PRIMARY CARE CLINIC | Age: 62
End: 2022-05-03
Payer: COMMERCIAL

## 2022-05-03 ENCOUNTER — HOSPITAL ENCOUNTER (OUTPATIENT)
Age: 62
Discharge: HOME | End: 2022-05-03
Payer: COMMERCIAL

## 2022-05-03 VITALS
SYSTOLIC BLOOD PRESSURE: 118 MMHG | HEIGHT: 69 IN | WEIGHT: 226 LBS | BODY MASS INDEX: 33.47 KG/M2 | HEART RATE: 72 BPM | DIASTOLIC BLOOD PRESSURE: 68 MMHG | RESPIRATION RATE: 16 BRPM

## 2022-05-03 DIAGNOSIS — I25.10 3-VESSEL CAD: ICD-10-CM

## 2022-05-03 DIAGNOSIS — E78.5 HYPERLIPIDEMIA, UNSPECIFIED HYPERLIPIDEMIA TYPE: ICD-10-CM

## 2022-05-03 DIAGNOSIS — E11.69 TYPE 2 DIABETES MELLITUS WITH OTHER SPECIFIED COMPLICATION, WITHOUT LONG-TERM CURRENT USE OF INSULIN (HCC): ICD-10-CM

## 2022-05-03 DIAGNOSIS — Z95.1 HISTORY OF CORONARY ARTERY BYPASS GRAFT X 3: Primary | ICD-10-CM

## 2022-05-03 DIAGNOSIS — M1A.9XX0 CHRONIC GOUT WITHOUT TOPHUS, UNSPECIFIED CAUSE, UNSPECIFIED SITE: ICD-10-CM

## 2022-05-03 DIAGNOSIS — I10 PRIMARY HYPERTENSION: ICD-10-CM

## 2022-05-03 DIAGNOSIS — M1A.9XX0 CHRONIC GOUT WITHOUT TOPHUS, UNSPECIFIED CAUSE, UNSPECIFIED SITE: Primary | ICD-10-CM

## 2022-05-03 DIAGNOSIS — Z87.891 PERSONAL HISTORY OF TOBACCO USE: ICD-10-CM

## 2022-05-03 DIAGNOSIS — E78.2 MIXED HYPERLIPIDEMIA: ICD-10-CM

## 2022-05-03 PROBLEM — L29.9 ITCHING: Status: ACTIVE | Noted: 2022-05-03

## 2022-05-03 LAB
CREATININE URINE: 46 MG/DL (ref 39–259)
MICROALBUMIN/CREAT 24H UR: <12 MG/L
MICROALBUMIN/CREAT UR-RTO: NORMAL MCG/MG CREAT
URIC ACID: 5.7 MG/DL (ref 3.4–7)

## 2022-05-03 PROCEDURE — 36415 COLL VENOUS BLD VENIPUNCTURE: CPT

## 2022-05-03 PROCEDURE — 97110 THERAPEUTIC EXERCISES: CPT

## 2022-05-03 PROCEDURE — G0296 VISIT TO DETERM LDCT ELIG: HCPCS | Performed by: STUDENT IN AN ORGANIZED HEALTH CARE EDUCATION/TRAINING PROGRAM

## 2022-05-03 PROCEDURE — 82570 ASSAY OF URINE CREATININE: CPT

## 2022-05-03 PROCEDURE — 82043 UR ALBUMIN QUANTITATIVE: CPT

## 2022-05-03 PROCEDURE — 3044F HG A1C LEVEL LT 7.0%: CPT | Performed by: STUDENT IN AN ORGANIZED HEALTH CARE EDUCATION/TRAINING PROGRAM

## 2022-05-03 PROCEDURE — 99214 OFFICE O/P EST MOD 30 MIN: CPT | Performed by: STUDENT IN AN ORGANIZED HEALTH CARE EDUCATION/TRAINING PROGRAM

## 2022-05-03 PROCEDURE — 84550 ASSAY OF BLOOD/URIC ACID: CPT

## 2022-05-03 PROCEDURE — 97140 MANUAL THERAPY 1/> REGIONS: CPT

## 2022-05-03 NOTE — TELEPHONE ENCOUNTER
----- Message from Priscila Morgan PA-C sent at 5/3/2022  8:12 AM EDT -----  Please let him know his LDL was elevated, we started Zetia yesterday. We will recheck his lipid panel in 6 weeks.

## 2022-05-03 NOTE — PROGRESS NOTES
Phone: Taran           Fax: [phone]                           Outpatient Physical Therapy                                                                            Daily Note    Patient: Destinee Tyler : 1960  CSN #: [id number]   Referring Physician: Isabelle Leon MD    Date: 5/3/2022    Diagnosis: Chronic R shoulder pain (M25.511), Chronic L shoulder pain (M25.512) G89.29  Treatment Diagnosis: B shoulder pain    Onset Date: 22  PT Insurance Information: BC[employer]  Total # of Visits Approved: 12 Per Physician Order  Total # of Visits to Date: 3  No Show: 0  Canceled Appointment: 0      Pre-Treatment Pain:  2/10  Subjective: Pt states pain level 2/10 upon arrival this date. Exercises:  Exercise 2: UBE 3'/3'  Exercise 3: Rows/ext BTB 2x15ea; IR/ER GTB x15ea  Exercise 4: Shrugs/retro rolls/retracts 2# [id number]  Exercise 6: Standing cane flex and ABD x15ea  Exercise 7: SM pushups x10-15    Manual:  Soft Tissue Mobilizaton: STM to L UT and bilateral cervical paraspinals    Modalities:   MHP x15min cervical and B shld       Assessment  Assessment: Progressed ther ex program within pt tolerance. Pt requires frequent VC throughout tx to improve/correct form. Manual STM through B cervical paraspinals and UT, with MHP to follow. Will progress as tolerated. Activity Tolerance  Activity Tolerance: Patient tolerated treatment well    Patient Education  Patient Education: Proper form with exercises  Pt verbalized/demonstrated good understanding:     [x] Yes         [] No, pt required further clarification. Post Treatment Pain:  2/10      Plan  Plan Frequency: 2x/wk  Plan weeks: 6       Goals  (Total # of Visits to Date: 3)      Short Term Goals  Time Frame for Short term goals: 3 weeks  Short term goal 1: Pt will initiate HEP. - met  Short term goal 2: Pt will be instructed in scapular strengthening to assist with decreasing BUE pain. - met/cont    Long Term Goals  Time Frame for Long term goals : 6 weeks  Long term goal 1: Pt will be independent and compliant with HEP. Long term goal 2: Pt will improve B shoulder AROM to WNL in all major planes with minimal pain for ease of reaching overhead. Long term goal 3: Pt will improve R shld flex, ABD and ER strength to >/= 4 to 4+/5 for ease of lifting/carrying tasks. Long term goal 4: Pt will report >/= 50% improvement in pain and overall function to improve QOL.     Minutes Tracking:  Time In: 5263  Time Out: 4849  Minutes: 57  Timed Code Treatment Minutes: [address], [address] [facility], DPT     Date: 5/3/2022

## 2022-05-03 NOTE — PROGRESS NOTES
[address], [address] , [facility], [address] [place] [address]  1960 is a 60 y.o. male, Established patient, here for evaluation of the following chief complaint(s):    Diabetes, Hypertension, Hyperlipidemia, Gout, and Skin Lesion (skin lesion on back)     ASSESSMENT/PLAN:  1. Chronic gout without tophus, unspecified cause, unspecified site  -     Uric Acid; Future  2. Primary hypertension  3. Hyperlipidemia, unspecified hyperlipidemia type  4. Type 2 diabetes mellitus with other specified complication, without long-term current use of insulin (HCC)  -     Microalbumin / Creatinine Urine Ratio; Future  5. Personal history of tobacco use  -     IA VISIT TO DISCUSS LUNG CA SCREEN W LDCT  -     CT Lung Screen (Annual); Future    Medications Discontinued During This Encounter   Medication Reason    ibuprofen (ADVIL;MOTRIN) 800 MG tablet LIST CLEANUP      Continue w/ allopurinol, recheck uric acid in about 1 month's time  BP well controlled with current meds, continue w/ lisinopril 20mg, toprol 25mg BID, Lasix 40mg Bid, Lipid panel reviewed - continue w/ current dose of statin today. Await Cardiology rec's re: current w/u  Monitor T2DM without meds, lifestyle modifications only, repeat A1c in 3 month's time, and if elevated plan for restarting metformin  F/U with Orthopedic Surgeon as per prior; PT      Colonoscopy is pending - scheduled on 5/17/2022 for an appointment w/ GS/  Low Dose CT (LDCT) Lung Screening criteria met:   Age 50-77(Medicare) or 50-80 (USPSTF)   Pack year smoking >20   Still smoking or less than 15 year since quit   No sign or symptoms of lung cancer   > 11 months since last LDCT     Risks and benefits of lung cancer screening with LDCT scans discussed:    Significance of positive screen - False-positive LDCT results often occur. 95% of all positive results do not lead to a diagnosis of cancer.  Usually further imaging can resolve most false-positive results; however, some patients may require invasive procedures. Over diagnosis risk - 10% to 12% of screen-detected lung cancer cases are over diagnosed--that is, the cancer would not have been detected in the patient's lifetime without the screening. Need for follow up screens annually to continue lung cancer screening effectiveness     Risks associated with radiation from annual LDCT- Radiation exposure is about the same as for a mammogram, which is about 1/3 of the annual background radiation exposure from everyday life. Starting screening at age 54 is not likely to increase cancer risk from radiation exposure. Patients with comorbidities resulting in life expectancy of < 10 years, or that would preclude treatment of an abnormality identified on CT, should not be screened due to lack of benefit. To obtain maximal benefit from this screening, smoking cessation and long-term abstinence from smoking is critical    Return in about 3 months (around 8/3/2022) for F/U Meds, T2DM. Davion Yanes received counseling on the following healthy behaviors: nutrition, exercise and medication adherence. I encouraged and discussed lifestyle modifications including diet and exercise and the patient was agreeable to making positive/beneficial changes to both to help improve their overall health. Discussed use, benefit, and side effects of prescribed medications. Barriers to medication compliance addressed. Patient given educational materials: see patient instructions. HM - HM items completed today as per orders. Outstanding HM items though not limited to immunizations were discussed with the patient today, including risks, benefits and alternatives. The patient will discuss these during the next appointment per their preference. If there are any worsening or concerning signs or symptoms, patient will report to the ED and/or contact [id number] for immediate evaluation.  Teach back method was used. All patient questions answered. Pt voiced understanding. Subjective    SUBJECTIVE/OBJECTIVE:    HPI   Diabetes, Hypertension, Hyperlipidemia, Gout, and Skin Lesion (skin lesion on back)    Maty Negron is a 60 y.o. male here for routine follow up of diabetes and HTN and hyperlipidemia. Diabetes:  He has not been checking  his blood glucose levels regularly. He denies numbness and tingling in the hands and feet. He has been compliant with diet and exercise. He has been compliant with his medications. He is not on metformin at this time. The patient has stopped drinking soda and also made other dietary changes. Hypertension:   He is exercising and is adherent to low salt diet. Blood pressure is well controlled at home. Cardiac symptoms none. Patient denies chest pain or other symptoms today. Cardiovascular risk factors: diabetes mellitus, dyslipidemia, hypertension and male gender. Use of agents associated with hypertension: none. BP is under good control today with current medications. He is following with Cardiology at this time and getting additional testing, including ECHO, EKG, CAM and a Stress test    Hyperlipidemia:  There is a family history of hyperlipidemia. The patient had a repeat lipid panel. He is currently on Crestor 20 mg and also fenofibrate 160 mg and he is tolerating these well at this time. Gout: Patient here for evaluation of gout. The patient reports no acute gout attacks since last clinic visit. Attacks occur primarily in the toes. The patient is avoiding high purine foods. The patient is on allopurinol 300 mg BID which was increased since prior. He is also establishing care with Podiatry.      Family History   Problem Relation Age of Onset    Cancer Mother     Heart Disease Father 76        pacemaker     Health Maintenance   Alcohol/Substance use History - None/Minimal  Smoking History    Tobacco Use      Smoking status: Former Smoker        Packs/day: 2.00 Years: 40.00        Pack years: [de-identified]        Quit date: 2012        Years since quitting: 10.3      Smokeless tobacco: Never Used    Health Maintenance   Topic Date Due    COVID-19 Vaccine (1) Never done    Pneumococcal 0-64 years Vaccine (1 - PCV) Never done    Diabetic foot exam  Never done    HIV screen  Never done    Hepatitis C screen  Never done    DTaP/Tdap/Td vaccine (1 - Tdap) Never done    Colorectal Cancer Screen  Never done    Shingles vaccine (1 of 2) Never done    Low dose CT lung screening  Never done    Diabetic microalbuminuria test  04/02/2022    Creatinine  04/19/2023 (Originally 8/7/2022)    Diabetic retinal exam  08/13/2023 (Originally 3/2/1977)    Flu vaccine (Season Ended) 09/01/2022    A1C test (Diabetic or Prediabetic)  04/19/2023    Depression Screen  04/19/2023    Potassium  04/19/2023    Lipids  05/02/2023    Hepatitis A vaccine  Aged Out    Hib vaccine  Aged Out    Meningococcal (ACWY) vaccine  Aged Out      Depression Screening  PHQ Scores 4/19/2022   PHQ2 Score 2   PHQ9 Score 2     Interpretation of Total Score Depression Severity: 1-4 = Minimal depression, 5-9 = Mild depression, 10-14 = Moderate depression, 15-19 = Moderately severe depression, 20-27 = Severe depression    Review of Systems   Constitutional: Negative for activity change, appetite change, chills, diaphoresis, fatigue, fever and unexpected weight change. HENT: Negative for sinus pressure, sinus pain, sore throat and trouble swallowing. Respiratory: Negative for cough, shortness of breath and wheezing. Cardiovascular: Negative for chest pain, palpitations and leg swelling. Gastrointestinal: Negative for abdominal pain, diarrhea, nausea and vomiting. Endocrine: Negative for cold intolerance, polydipsia, polyphagia and polyuria. Genitourinary: Negative for difficulty urinating, flank pain and frequency. Musculoskeletal: Negative for gait problem and joint swelling.  Positive for back pain, neck pain and neck stiffness. Positive for foot pain/chronic and bilateral wrist pain (chronic, unchanged since prior), chronic bilateral shoulder pain  Skin: Negative for color change and wound. Negative for pallor and rash. Allergic/Immunologic: Negative for environmental allergies and food allergies. Neurological: Negative for light-headedness, numbness and headaches. Psychiatric/Behavioral: Negative for sleep disturbance. Negative for confusion and suicidal ideas. Objective    Physical Exam   Constitutional: Patient is oriented to person, place, and time. Patient appears well-developed and well-nourished. No distress. HENT: Head: Normocephalic and atraumatic. Eyes: Pupils are equal, round, and reactive to light. Conjunctivae are normal. Right eye exhibits no discharge. Left eye exhibits no discharge. Cardiovascular: Normal rate, regular rhythm and normal heart sounds. Pulmonary/Chest: Effort normal and breath sounds normal. No respiratory distress. Patient has no wheezes. Abdominal: Soft. Bowel sounds are normal. Patient exhibits no distension. There is no tenderness. Musculoskeletal:  Patient exhibits no edema and tenderness. Patient exhibits no deformity. Spine  SKIN:  Intact without rashes, lesions or ulcerations. NEURO: Sensation to the extremity is intact. VASC:  Capillary refill is less than 3 seconds. Distal pulses are palpable. There is no lymphadenopathy. Inspection- No deformity, no atrophy  Palpation - Tenderness: yes lower lumbar  ROM - normal  Strength- WNL  Sensation -WNL  Reflexes - WNL  SLR: negative  Gait: antalgic  Cervical spine ROM WNL  Spurlings: positive  MSK:   Forward elevation, external rotation, abduction, internal rotation reduced. Supraspinatus 5/5   External rotators 5/5  Internal 5/5  Neer's test positive   Pereyra-Jeremy test. positive. Pain with cross body adduction positive. Pain over anterolateral acromion positive.     Pain over [facility] joint positive. Pain over traps/rhomboids positive. Neurological: Patient is alert and oriented to person, place, and time. Skin: Skin is warm and dry. Patient is not diaphoretic. Psychiatric: Patient's speech is normal and behavior is normal. Thought content normal.   Vitals reviewed.     /68 (Site: Right Upper Arm, Position: Sitting)   Pulse 72   Resp 16   Ht 5' 9\" (1.753 m)   Wt 226 lb (102.5 kg)   BMI 33.37 kg/m²   BP Readings from Last 3 Encounters:   05/03/22 118/68   05/02/22 125/61   04/19/22 137/66     Lab Results   Component Value Date    WBC 4.4 04/19/2022    HGB 13.0 04/19/2022    HCT 39.7 (L) 04/19/2022     04/19/2022    CHOL 158 05/02/2022    TRIG 162 (H) 05/02/2022    HDL 37 (L) 05/02/2022    LDLDIRECT 72 04/02/2021    ALT 30 04/19/2022    AST 22 04/19/2022     04/19/2022    K 4.9 04/19/2022     04/19/2022    CREATININE 0.71 04/19/2022    BUN 34 (H) 04/19/2022    CO2 27 04/19/2022    TSH 3.01 04/19/2022    LABA1C 6.5 (H) 04/19/2022    LABMICR CANNOT BE CALCULATED 04/02/2021     Lab Results   Component Value Date    CALCIUM 9.6 04/19/2022     Lab Results   Component Value Date    LDLCHOLESTEROL 89 05/02/2022    LDLDIRECT 72 04/02/2021       CURRENT MEDS W/ ASSOC DIAG         Start Date End Date     allopurinol (ZYLOPRIM) 300 MG tablet  04/25/22  --     Take 1 tablet by mouth 2 times daily     Associated Diagnoses:  Chronic gout without tophus, unspecified cause, unspecified site     ASPIRIN 81 PO  --  --     Associated Diagnoses:  --     Cyanocobalamin (VITAMIN B 12 PO)  --  --     Associated Diagnoses:  --     DULoxetine (CYMBALTA) 60 MG extended release capsule  --  --     Associated Diagnoses:  --     ezetimibe (ZETIA) 10 MG tablet  05/02/22  --     Take 1 tablet by mouth daily     Associated Diagnoses:  --     fenofibrate (TRIGLIDE) 160 MG tablet  04/20/22  --     Take 1 tablet by mouth daily     Associated Diagnoses:  Hypertriglyceridemia     furosemide (LASIX) 40 MG tablet  --  --     Associated Diagnoses:  --     ibuprofen (ADVIL;MOTRIN) 800 MG tablet  --  --     Associated Diagnoses:  --     lisinopril (PRINIVIL;ZESTRIL) 20 MG tablet  --  --     Associated Diagnoses:  --     methocarbamol (ROBAXIN) 500 MG tablet  04/19/22  --     Take 1 tablet by mouth nightly     Associated Diagnoses:  Muscle spasm     metoprolol succinate (TOPROL XL) 50 MG extended release tablet  --  --     Associated Diagnoses:  --     Multiple Vitamins-Minerals (MENS 50+ MULTI VITAMIN/MIN PO)  --  --     Associated Diagnoses:  --     omeprazole (PRILOSEC) 20 MG delayed release capsule  --  --     Associated Diagnoses:  --     pramipexole (MIRAPEX) 1.5 MG tablet  --  --     Associated Diagnoses:  --     rosuvastatin (CRESTOR) 20 MG tablet  --  --     Associated Diagnoses:  --     VITAMIN D PO  --  --     Associated Diagnoses:  --        Please note that this chart was generated using voice recognition Dragon dictation software. Although every effort was made to ensure the accuracy of this automated transcription, some errors in transcription may have occurred.     Electronically signed by Orlando Dhaliwal MD on 5/3/22 at 8:46 AM

## 2022-05-06 ENCOUNTER — HOSPITAL ENCOUNTER (OUTPATIENT)
Dept: PHYSICAL THERAPY | Age: 62
Setting detail: THERAPIES SERIES
Discharge: HOME | End: 2022-05-06
Payer: COMMERCIAL

## 2022-05-06 PROCEDURE — 97110 THERAPEUTIC EXERCISES: CPT

## 2022-05-06 PROCEDURE — 97140 MANUAL THERAPY 1/> REGIONS: CPT

## 2022-05-06 NOTE — PROGRESS NOTES
Phone: Taran           Fax: [phone]                           Outpatient Physical Therapy                                                                            Daily Note    Patient: Damaso Avina : 1960  CSN #: [id number]   Referring Physician: Juan Pablo Marc MD    Date: 2022    Diagnosis: Chronic R shoulder pain (M25.511), Chronic L shoulder pain (M25.512) G89.29  Treatment Diagnosis: B shoulder pain    Onset Date: 22  PT Insurance Information: BCBS  Total # of Visits Approved: 12 Per Physician Order  Total # of Visits to Date: 4  No Show: 0  Canceled Appointment: 0      Pre-Treatment Pain:  210  Subjective: Pt states pain level 2/10. No new issues/concerns reported at this time. Exercises:  Exercise 1: HEP: UT stretch, shrugs, retracts; updated rows/ext PTB  Exercise 2: UBE 3'/3'  Exercise 3: Rows/ext BTB 2x15ea; IR/ER GTB x15ea  Exercise 4: Shrugs/retro rolls/retracts 5# [id number]  Exercise 7: SM pushups x10-15  Exercise 8: Corner stretch [id number]    Manual:  Soft Tissue Mobilizaton: Warm thermoprobe to bilateral cervical paraspinals and B UT/scalenes    Modalities:   MHP cervical and B shoulders x15min    Assessment  Assessment: Cont current exercise program, with progressions made within pt tolerance. Pt remains TTP through scalenes, UT and cervical paraspinals. Initiated warm thermoprobe for manual STM to the above mentioned regions. MHP applied at end of tx for pain. Will progress as tolerated. Activity Tolerance  Activity Tolerance: Patient tolerated treatment well    Patient Education  Patient Education: Updated HEP  Pt verbalized/demonstrated good understanding:     [x] Yes         [] No, pt required further clarification.        Post Treatment Pain:  210      Plan  Plan Frequency: 2x/wk  Plan weeks: 6       Goals  (Total # of Visits to Date: 4)      Short Term Goals  Time Frame for Short term goals: 3 weeks  Short term goal 1: Pt will initiate HEP. - met  Short term goal 2: Pt will be instructed in scapular strengthening to assist with decreasing BUE pain. - met/cont    Long Term Goals  Time Frame for Long term goals : 6 weeks  Long term goal 1: Pt will be independent and compliant with HEP. Long term goal 2: Pt will improve B shoulder AROM to WNL in all major planes with minimal pain for ease of reaching overhead. Long term goal 3: Pt will improve R shld flex, ABD and ER strength to >/= 4 to 4+/5 for ease of lifting/carrying tasks. Long term goal 4: Pt will report >/= 50% improvement in pain and overall function to improve QOL.     Minutes Tracking:  Time In: 3783  Time Out: 7429  Minutes: 60  Timed Code Treatment Minutes: [address] [place], [place], DPT      Date: 5/6/2022

## 2022-05-10 ENCOUNTER — HOSPITAL ENCOUNTER (OUTPATIENT)
Dept: PHYSICAL THERAPY | Age: 62
Setting detail: THERAPIES SERIES
Discharge: HOME | End: 2022-05-10
Payer: COMMERCIAL

## 2022-05-10 ENCOUNTER — TELEPHONE (OUTPATIENT)
Dept: PRIMARY CARE CLINIC | Age: 62
End: 2022-05-10

## 2022-05-10 PROCEDURE — 97110 THERAPEUTIC EXERCISES: CPT

## 2022-05-10 PROCEDURE — 97140 MANUAL THERAPY 1/> REGIONS: CPT

## 2022-05-10 NOTE — PROGRESS NOTES
Phone: Taran           Fax: [phone]                           Outpatient Physical Therapy                                                                            Daily Note    Patient: Emilie Espino : 1960  CSN #: [id number]   Referring Physician: Gabriel Gama MD    Date: 5/10/2022    Diagnosis: Chronic R shoulder pain (M25.511), Chronic L shoulder pain (M25.512) G89.29  Treatment Diagnosis: B shoulder pain    Onset Date: 22  PT Insurance Information: BCBS  Total # of Visits Approved: 12 Per Physician Order  Total # of Visits to Date: 5  No Show: 0  Canceled Appointment: 0      Pre-Treatment Pain:  2/10  Subjective: Pt states pain level remains at 2/10. Pt states he felt good after last tx. Exercises:  Exercise 1: HEP: UT stretch, shrugs, retracts; updated rows/ext PTB  Exercise 2: UBE 5'/5'  Exercise 3: Rows/ext PTB 2x15ea; IR/ER GTB x15ea  Exercise 4: Shrugs/retro rolls/retracts 5# [id number]  Exercise 8: Corner stretch [id number]  Exercise 9: Horz ABD GTB x15    Manual:  Soft Tissue Mobilizaton: Warm thermoprobe to bilateral cervical paraspinals and B UT/scalenes    Modalities:   MHP x10min cervical    Assessment  Assessment: Cont to gradually progress postural strengthening, with VC to decrease UT compensation. Cont with warm thermoprobe to B cervical paraspinals and UT region for STM, which pt reports relief following. Will progress as tolerated. Activity Tolerance  Activity Tolerance: Patient tolerated treatment well    Patient Education  Patient Education: Ex progressions  Pt verbalized/demonstrated good understanding:     [x] Yes         [] No, pt required further clarification. Post Treatment Pain:  2/10      Plan  Plan Frequency: 2x/wk  Plan weeks: 6       Goals  (Total # of Visits to Date: 5)      Short Term Goals  Time Frame for Short term goals: 3 weeks  Short term goal 1: Pt will initiate HEP. - met  Short term goal 2: Pt will be instructed in scapular strengthening to assist with decreasing BUE pain. - met/cont    Long Term Goals  Time Frame for Long term goals : 6 weeks  Long term goal 1: Pt will be independent and compliant with HEP. Long term goal 2: Pt will improve B shoulder AROM to WNL in all major planes with minimal pain for ease of reaching overhead. Long term goal 3: Pt will improve R shld flex, ABD and ER strength to >/= 4 to 4+/5 for ease of lifting/carrying tasks. Long term goal 4: Pt will report >/= 50% improvement in pain and overall function to improve QOL.     Minutes Tracking:  Time In: 5704  Time Out: 1110  Minutes: 55  Timed Code Treatment Minutes: [address], [place], [facility]      Date: 5/10/2022

## 2022-05-10 NOTE — TELEPHONE ENCOUNTER
Hello, I would have the patient continue the Allopurinol. The uric acid level is WNL because he is on the medication, thank you.   Electronically signed by Guerline Lyons MD on 5/10/2022 at 4:21 PM

## 2022-05-10 NOTE — TELEPHONE ENCOUNTER
Patient asked if he can stop taking his Allopurinol now that his Uric acid levels are back to normal?

## 2022-05-12 NOTE — TELEPHONE ENCOUNTER
Patient informed. Patient had an appointment with podiatry and was informed he has arthritis in his feet. Patient agrees to continue to take Allopurinol.

## 2022-05-13 ENCOUNTER — HOSPITAL ENCOUNTER (OUTPATIENT)
Dept: PHYSICAL THERAPY | Age: 62
Setting detail: THERAPIES SERIES
Discharge: HOME | End: 2022-05-13
Payer: COMMERCIAL

## 2022-05-13 PROCEDURE — 97140 MANUAL THERAPY 1/> REGIONS: CPT

## 2022-05-13 PROCEDURE — 97110 THERAPEUTIC EXERCISES: CPT

## 2022-05-13 NOTE — PROGRESS NOTES
Phone: Taran           Fax: [phone]                           Outpatient Physical Therapy                                                                            Daily Note    Patient: Braxton Avendano : 1960  CSN #: [id number]   Referring Physician: Humza Shukla MD    Date: 2022    Diagnosis: Chronic R shoulder pain (M25.511), Chronic L shoulder pain (M25.512) G89.29  Treatment Diagnosis: B shoulder pain    Onset Date: 22  PT Insurance Information: [facility]  Total # of Visits Approved: 12 Per Physician Order  Total # of Visits to Date: 6  No Show: 0  Canceled Appointment: 0      Pre-Treatment Pain:  0-110  Subjective: Pt states he feels pretty good today and felt good after last tx. No new issues/concerns. Exercises:  Exercise 2: UBE 5'/5'  Exercise 3: Rows/ext Gray TB 2x15ea; IR/ER BTB x15ea  Exercise 8: Corner stretch [id number]  Exercise 9: Horz ABD GTB x15  Exercise 10: Joystick x15ea direction  Exercise 11: 90/90 2# x15ea    Manual:  Soft Tissue Mobilizaton: Warm thermoprobe to bilateral cervical paraspinals and B UT/scalenes      Assessment  Assessment: Progressed UE and postural strengthening program this date, which pt tolerates well. Reviewed avoidance of pushign through painful motions/exercises and reviewed proper form with HEP as well as avoidance of compensation. Will progress as tolerated. Activity Tolerance  Activity Tolerance: Patient tolerated treatment well    Patient Education  Patient Education: Proper form and avoidance of compensations  Pt verbalized/demonstrated good understanding:     [x] Yes         [] No, pt required further clarification. Post Treatment Pain:  0-1/10      Plan  Plan Frequency: 2x/wk  Plan weeks: 6       Goals  (Total # of Visits to Date: 6)      Short Term Goals  Time Frame for Short term goals: 3 weeks  Short term goal 1: Pt will initiate HEP. - met  Short term goal 2: Pt will be instructed in scapular strengthening to assist with decreasing BUE pain. - met/cont    Long Term Goals  Time Frame for Long term goals : 6 weeks  Long term goal 1: Pt will be independent and compliant with HEP. Long term goal 2: Pt will improve B shoulder AROM to WNL in all major planes with minimal pain for ease of reaching overhead. Long term goal 3: Pt will improve R shld flex, ABD and ER strength to >/= 4 to 4+/5 for ease of lifting/carrying tasks. Long term goal 4: Pt will report >/= 50% improvement in pain and overall function to improve QOL.     Minutes Tracking:  Time In: 5535  Time Out: [address]  Minutes: 42  Timed Code Treatment Minutes: Chinmay Wong, [place], DPTREVOR      Date: 5/13/2022

## 2022-05-16 ENCOUNTER — TELEPHONE (OUTPATIENT)
Dept: SURGERY | Age: 62
End: 2022-05-16

## 2022-05-16 ENCOUNTER — OFFICE VISIT (OUTPATIENT)
Dept: NEUROLOGY | Age: 62
End: 2022-05-16
Payer: COMMERCIAL

## 2022-05-16 ENCOUNTER — HOSPITAL ENCOUNTER (OUTPATIENT)
Dept: PHYSICAL THERAPY | Age: 62
Setting detail: THERAPIES SERIES
Discharge: HOME | End: 2022-05-16
Payer: COMMERCIAL

## 2022-05-16 VITALS
HEART RATE: 74 BPM | BODY MASS INDEX: 33.18 KG/M2 | WEIGHT: 224 LBS | RESPIRATION RATE: 18 BRPM | TEMPERATURE: 97.4 F | HEIGHT: 69 IN | DIASTOLIC BLOOD PRESSURE: 65 MMHG | SYSTOLIC BLOOD PRESSURE: 132 MMHG

## 2022-05-16 DIAGNOSIS — G25.81 RLS (RESTLESS LEGS SYNDROME): Primary | ICD-10-CM

## 2022-05-16 PROCEDURE — 97110 THERAPEUTIC EXERCISES: CPT

## 2022-05-16 PROCEDURE — 97140 MANUAL THERAPY 1/> REGIONS: CPT

## 2022-05-16 PROCEDURE — 99204 OFFICE O/P NEW MOD 45 MIN: CPT | Performed by: NEUROMUSCULOSKELETAL MEDICINE, SPORTS MEDICINE

## 2022-05-16 RX ORDER — CLONAZEPAM 0.5 MG/1
0.5 TABLET ORAL NIGHTLY
Qty: 14 TABLET | Refills: 0 | Status: SHIPPED | OUTPATIENT
Start: 2022-05-16 | End: 2022-05-26 | Stop reason: ALTCHOICE

## 2022-05-16 NOTE — PATIENT INSTRUCTIONS
SURVEY:    You may be receiving a survey from [employer] regarding your visit today. Please complete the survey to enable us to provide the highest quality of care to you and your family. If you cannot score us a very good on any question, please call the office to discuss how we could have made your experience a very good one. Thank you.

## 2022-05-16 NOTE — PROGRESS NOTES
NEUROLOGY CONSULT    Patient Name:  Kunal Beatty  :   1960  Clinic Visit Date: 2022    I saw Mr. Kunal Beatty  in the neurology clinic today for evaluation of restless leg syndrome. 78-year-old right-handed gentleman with a history of multiple problems including diabetes mellitus, hypertension, osteoarthritis, CAD status post CABG in the past, history of  cancer of the larynx diagnosed and treated with radiation therapy in , chronic lower back pain, cervical and lumbar laminectomies many years ago, diagnosed with restless leg syndrome several years ago currently being treated with a combination of Mirapex and Cymbalta without much Improvement in his lumbar sensation of #3 still, intermittent jerking movements at nighttime, lack of sleep due to the aforementioned symptoms. He had been treated with gabapentin for couple of years which caused side effects of pedal edema and was discontinued. REVIEW OF SYSTEMS    Constitutional Weight changes: absent, change in appetite: absent Fatigue: present; Fevers : absent, Any recent hospitalizations:  absent   HEENT Ears: ringing,  Visual disturbance: absent   Respiratory Shortness of breath: absent, choking:  absent, Cough: absent, Snoring : absent   Cardiovascular Chest pain: absent, Leg swelling :present, palpitations : absent, fainting : absent   GI Constipation: absent, Diarrhea: absent, Swallowing change: absent    Urinary frequency: present, Urinary urgency: absent, Urinary incontinence: absent   Musculoskeletal Neck pain: present, Back pain: present, Stiffness: absent, Muscle pain: present, Joint pain: present, restless leg : present   Dermatological Hair loss: present, Skin changes: present   Neurological Confusion: present, Trouble concentrating: present, Seizures: absent;  Memory loss: present, balance problem: absent, Dizziness: present, vertigo: absent, Weakness: absent, Numbness present, Tremor: absent, Spasm: present, involuntary movement: absent, Speech difficulty: absent, Headache: absent, Light sensitivity: absent   Psychiatric Anxiety: absent, Depression  present, drug abuse: absent, Hallucination: absent, mood disorder: absent, Suicidal ideations absent   Hematologic Abnormal bleeding: absent, Anemia: absent, Lymph gland changes: absent Clotting disorder: absent     Past Medical History:   Diagnosis Date    Arthritis     CAD (coronary artery disease)     Cancer ([facility]     Tonsil    Carpal tunnel syndrome 2/9/2018    Diabetes mellitus ([facility] 4/20/2022    Gastroesophageal reflux disease 4/20/2022    Hyperlipidemia     Hypertension     Movement disorder        Past Surgical History:   Procedure Laterality Date    APPENDECTOMY  08/07/2021    laparoscopic    CARDIAC SURGERY      CABG x 3 in 2008    JOINT REPLACEMENT  06/25/2021    RIGHT KNEE SURGERY    LAPAROSCOPIC APPENDECTOMY N/A 08/07/2021    APPENDECTOMY LAPAROSCOPIC performed by Quirino Torres MD at [address] [place] [address] History     Socioeconomic History    Marital status:      Spouse name: Not on file    Number of children: Not on file    Years of education: Not on file    Highest education level: Not on file   Occupational History    Not on file   Tobacco Use    Smoking status: Former Smoker     Packs/day: 2.00     Years: 40.00     Pack years: 80.00     Quit date: 2012     Years since quitting: 10.3    Smokeless tobacco: Never Used   Vaping Use    Vaping Use: Never used   Substance and Sexual Activity    Alcohol use: Yes     Comment: Occasional    Drug use: Never    Sexual activity: Not on file   Other Topics Concern    Not on file   Social History Narrative    Not on file     Social Determinants of Health     Financial Resource Strain: Low Risk     Difficulty of Paying Living Expenses: Not hard at all   Food Insecurity: No Food Insecurity    Worried About Running Out of Food in the Last Year: Never true    Walt of Food in the Last Year: Never true   Transportation Needs: No Transportation Needs    Lack of Transportation (Medical): No    Lack of Transportation (Non-Medical): No   Physical Activity:     Days of Exercise per Week: Not on file    Minutes of Exercise per Session: Not on file   Stress:     Feeling of Stress : Not on file   Social Connections:     Frequency of Communication with Friends and Family: Not on file    Frequency of Social Gatherings with Friends and Family: Not on file    Attends Evangelical Services: Not on file    Active Member of [address] [place] [address] or Organizations: Not on file    Attends Club or Organization Meetings: Not on file    Marital Status: Not on file   Intimate Partner Violence:     Fear of Current or Ex-Partner: Not on file    Emotionally Abused: Not on file    Physically Abused: Not on file    Sexually Abused: Not on file   Housing Stability:     Unable to Pay for Housing in the Last Year: Not on file    Number of [place] in the Last Year: Not on file    Unstable Housing in the Last Year: Not on file       Family History   Problem Relation Age of Onset    Cancer Mother     Heart Disease Father 76        pacemaker       Current Outpatient Medications   Medication Sig Dispense Refill    diclofenac sodium (VOLTAREN) 1 % GEL Apply topically 4 times daily as needed for Pain      clonazePAM (KLONOPIN) 0.5 MG tablet Take 1 tablet by mouth nightly for 14 days.  14 tablet 0    ezetimibe (ZETIA) 10 MG tablet Take 1 tablet by mouth daily 90 tablet 1    allopurinol (ZYLOPRIM) 300 MG tablet Take 1 tablet by mouth 2 times daily 180 tablet 0    fenofibrate (TRIGLIDE) 160 MG tablet Take 1 tablet by mouth daily 90 tablet 0    ASPIRIN 81 PO Take by mouth daily       Cyanocobalamin (VITAMIN B 12 PO) Take 1,000 mg by mouth      VITAMIN D PO Take 5,000 Units by mouth daily       Multiple Vitamins-Minerals (MENS 50+ MULTI VITAMIN/MIN PO) Take by mouth daily       methocarbamol (ROBAXIN) 500 MG tablet Take 1 tablet by mouth nightly 60 tablet 0    DULoxetine (CYMBALTA) 60 MG extended release capsule Take 60 mg by mouth daily      rosuvastatin (CRESTOR) 20 MG tablet Take 20 mg by mouth daily      omeprazole (PRILOSEC) 20 MG delayed release capsule Take 20 mg by mouth daily      furosemide (LASIX) 40 MG tablet Take 40 mg by mouth 2 times daily      lisinopril (PRINIVIL;ZESTRIL) 20 MG tablet Take 20 mg by mouth daily      metoprolol succinate (TOPROL XL) 50 MG extended release tablet Take 25 mg by mouth 2 times daily      pramipexole (MIRAPEX) 1.5 MG tablet Take 1.5 mg by mouth nightly       No current facility-administered medications for this visit. DATA:  Lab Results   Component Value Date    WBC 4.4 04/19/2022    HGB 13.0 04/19/2022     04/19/2022    CHOL 158 05/02/2022    TRIG 162 (H) 05/02/2022    HDL 37 (L) 05/02/2022    LDLDIRECT 72 04/02/2021    ALT 30 04/19/2022    AST 22 04/19/2022     04/19/2022    K 4.9 04/19/2022     04/19/2022    CREATININE 0.71 04/19/2022    BUN 34 (H) 04/19/2022    CO2 27 04/19/2022    TSH 3.01 04/19/2022    LABA1C 6.5 (H) 04/19/2022    LABMICR Can not be calculated 05/03/2022       /65 (Site: Right Upper Arm, Position: Sitting, Cuff Size: Large Adult)   Pulse 74   Temp 97.4 °F (36.3 °C) (Temporal)   Resp 18   Ht 5' 9\" (1.753 m)   Wt 224 lb (101.6 kg)   BMI 33.08 kg/m²     NEUROLOGICAL EXAMINATION:     MENTAL STATUS: Patient is alert and oriented x3. Khoa Baxter CRANIAL NERVES: Pupils are equal and reactive. EOMS are. No abnormal eye movements. Facial sensation is normal.  There is no facial weakness. Is normal.  Palate and tongue movements are normal.  Shoulder shrug is symmetrical.      MOTOR EXAMINATION: Muscle tone is normal in all the limbs. Strength is 5/5 in both upper and lower limbs. There are no abnormal limb movements.      SENSORY EXAMINATION: Decreased sensation on the lateral aspect of the left thigh    STRETCH REFLEXES: Symmetrical and reduced in both the upper and lower limbs. GAIT:. .  Normal.    IMPRESSION:    1. Symptoms of restless leg syndrome for several years, with no improvement on Cymbalta and Mirapex. These symptoms have led to chronic insomnia  2. Left-sided lateral femoral cutaneous neuropathy with numbness over the left lateral part of the thigh    PLAN:    1.  I have started him on Klonopin 0.5 mg at bedtime and see how he does. I have discussed the potential side effects of fatigue and drowsiness with Klonopin  2. Continue Cymbalta and Mirapex for the time being  3. Follow-up in 2 weeks    NOTE: This neurology evaluation is part of outpatient coverage at [facility]  1-2 days per week. Patients requiring frequent evaluations or uncomfortable with potential 3-4 day turnaround on questions or calls  may be better served by a neurologist in the area full time. [facility]. Tona is available for outpatient visits and procedures including EMG/NCS. Non-[facility] neurologists also practice in  [facility] (Dr. Franco Brown) and [facility]        Reina Vasquez MD   5/16/2022  12:13 PM

## 2022-05-16 NOTE — PROGRESS NOTES
Phone: Taran           Fax: [phone]                           Outpatient Physical Therapy                                                                            Daily Note    Patient: Uma Sands : 1960  CSN #: [id number]   Referring Physician: Lashell Jameson MD    Date: 2022    Diagnosis: Chronic R shoulder pain (M25.511), Chronic L shoulder pain (M25.512) G89.29  Treatment Diagnosis: B shoulder pain    Onset Date: 22  PT Insurance Information: BCBS  Total # of Visits Approved: 12 Per Physician Order  Total # of Visits to Date: 7  No Show: 0  Canceled Appointment: 0      Pre-Treatment Pain:  2/10  Subjective: Pt states pain level remains 2/10 upon arrival this date. Otherwise no new issues/concerns. Exercises:  Exercise 1: HEP: UT stretch, shrugs, retracts; updated rows/ext PTB  Exercise 2: UBE 5'/5'  Exercise 3: Rows/ext Gray TB 2x15ea; IR/ER BTB x15ea  Exercise 8: Corner stretch [id number] -- TRX stretch this date  Exercise 9: Horz ABD GTB x15  Exercise 10: Joystick x15ea direction  Exercise 11: 90/90 2# x15ea  Exercise 12: UT stretch 3m38kfm    Manual:  Soft Tissue Mobilizaton: Warm thermoprobe to bilateral cervical paraspinals and B UT/scalenes      Assessment  Assessment: B shld flex WNL, IR reach behind back is WNL on LUE and WFL on RUE but more limited and painful on RUE. Pt denies need for MHP at end of tx. Will progress as tolerated. Activity Tolerance  Activity Tolerance: Patient tolerated treatment well    Patient Education  Patient Education: Reviewed proper form with exercises  Pt verbalized/demonstrated good understanding:     [x] Yes         [] No, pt required further clarification. Post Treatment Pain:  2/10      Plan  Plan Frequency: 2x/wk  Plan weeks: 6       Goals  (Total # of Visits to Date: 7)      Short Term Goals  Time Frame for Short term goals: 3 weeks  Short term goal 1: Pt will initiate HEP. - met  Short term goal 2: Pt will be instructed in scapular strengthening to assist with decreasing BUE pain. - met/cont    Long Term Goals  Time Frame for Long term goals : 6 weeks  Long term goal 1: Pt will be independent and compliant with HEP. Long term goal 2: Pt will improve B shoulder AROM to WNL in all major planes with minimal pain for ease of reaching overhead. - progressing  Long term goal 3: Pt will improve R shld flex, ABD and ER strength to >/= 4 to 4+/5 for ease of lifting/carrying tasks. Long term goal 4: Pt will report >/= 50% improvement in pain and overall function to improve QOL.     Minutes Tracking:  Time In: 1000  Time Out: 0446  Minutes: 45  Timed Code Treatment Minutes: [address], [place], DPT      Date: 5/16/2022

## 2022-05-17 ENCOUNTER — TELEPHONE (OUTPATIENT)
Dept: CARDIOLOGY | Age: 62
End: 2022-05-17

## 2022-05-17 ENCOUNTER — TELEPHONE (OUTPATIENT)
Dept: ONCOLOGY | Age: 62
End: 2022-05-17

## 2022-05-17 ENCOUNTER — HOSPITAL ENCOUNTER (OUTPATIENT)
Dept: NON INVASIVE DIAGNOSTICS | Age: 62
Discharge: HOME | End: 2022-05-17
Payer: COMMERCIAL

## 2022-05-17 DIAGNOSIS — Z95.1 HISTORY OF CORONARY ARTERY BYPASS GRAFT X 3: ICD-10-CM

## 2022-05-17 DIAGNOSIS — M79.89 LEG SWELLING: ICD-10-CM

## 2022-05-17 DIAGNOSIS — I10 PRIMARY HYPERTENSION: ICD-10-CM

## 2022-05-17 DIAGNOSIS — I25.10 3-VESSEL CAD: ICD-10-CM

## 2022-05-17 DIAGNOSIS — I25.10 CORONARY ARTERY DISEASE WITHOUT ANGINA PECTORIS, UNSPECIFIED VESSEL OR LESION TYPE, UNSPECIFIED WHETHER NATIVE OR TRANSPLANTED HEART: ICD-10-CM

## 2022-05-17 LAB
LV EF: 53 %
LVEF MODALITY: NORMAL

## 2022-05-17 PROCEDURE — 93017 CV STRESS TEST TRACING ONLY: CPT

## 2022-05-17 PROCEDURE — 3430000000 HC RX DIAGNOSTIC RADIOPHARMACEUTICAL: Performed by: PHYSICIAN ASSISTANT

## 2022-05-17 PROCEDURE — 78452 HT MUSCLE IMAGE SPECT MULT: CPT

## 2022-05-17 PROCEDURE — A9500 TC99M SESTAMIBI: HCPCS | Performed by: PHYSICIAN ASSISTANT

## 2022-05-17 PROCEDURE — 93306 TTE W/DOPPLER COMPLETE: CPT

## 2022-05-17 RX ADMIN — Medication 30 MILLICURIE: at 08:52

## 2022-05-17 NOTE — RESULT ENCOUNTER NOTE
Please let them know that their CAM monitor was overall unremarkable. We will discuss at their follow up appointment.

## 2022-05-17 NOTE — TELEPHONE ENCOUNTER
----- Message from Carlos Salvador PA-C sent at 5/17/2022  2:50 PM EDT -----  Please let them know that their CAM monitor was overall unremarkable. We will discuss at their follow up appointment.

## 2022-05-17 NOTE — PROCEDURES
[address]                                 EVENT MONITOR    PATIENT NAME: Sadaf Jones                   :        1960  MED REC NO:   [id number]                              ROOM:  ACCOUNT NO:   [de-identified]                           ADMIT DATE: 2022  PROVIDER:     Gael Fuller MD    CARDIOVASCULAR DIAGNOSTIC DEPARTMENT    DATE OF STUDY:  2022    ORDERING PROVIDER:  Sole Contreras PA-C    PRIMARY CARE PROVIDER:  Lynette Duran MD    INTERPRETING PHYSICIAN:  Gael Fuller MD    DIAGNOSIS:  Syncope and collapse. PHYSICIAN INTERPRETATION:  1.  5 days and 22 hours recorded. 2.  Baseline rhythm is sinus with average heart rate of 82 bpm ranging  between 49 and 151 bpm.  3.  Sinus tachycardia represented 12% of the study duration. 4.  Atrial tachycardia:  2 episodes. Longest/fastest, 3 beats at  average 123 bpm up to 137 bpm.  5.  PVC 0.68%. 6.  Patient-activated events correlated with sinus rhythm and isolated  PVCs.         Katja Patel MD    D: 2022 14:37:12       T: 2022 14:38:45     LUCI/[email or web address]_VICENTE  Job#: [id number]     Doc#: Unknown    CC:  AZUCENA Soliman MD

## 2022-05-18 ENCOUNTER — TELEPHONE (OUTPATIENT)
Dept: CARDIOLOGY | Age: 62
End: 2022-05-18

## 2022-05-18 ENCOUNTER — HOSPITAL ENCOUNTER (OUTPATIENT)
Dept: NON INVASIVE DIAGNOSTICS | Age: 62
Discharge: HOME | End: 2022-05-18
Payer: COMMERCIAL

## 2022-05-18 PROCEDURE — 3430000000 HC RX DIAGNOSTIC RADIOPHARMACEUTICAL: Performed by: PHYSICIAN ASSISTANT

## 2022-05-18 PROCEDURE — 78452 HT MUSCLE IMAGE SPECT MULT: CPT

## 2022-05-18 PROCEDURE — A9500 TC99M SESTAMIBI: HCPCS | Performed by: PHYSICIAN ASSISTANT

## 2022-05-18 RX ADMIN — Medication 30 MILLICURIE: at 12:40

## 2022-05-18 NOTE — TELEPHONE ENCOUNTER
----- Message from Hailey Frye PA-C sent at 5/18/2022  9:10 AM EDT -----  Please let them know their echo was abnormal. Will discuss at follow up.

## 2022-05-18 NOTE — TELEPHONE ENCOUNTER
ANTICOAGULATION FOLLOW-UP CLINIC VISIT    Patient Name:  Hayden Ramires  Date:  4/3/2017  Contact Type:  Telephone    SUBJECTIVE:     Patient Findings     Positives No Problem Findings           OBJECTIVE    INR   Date Value Ref Range Status   04/03/2017 2.4  Final       ASSESSMENT / PLAN  INR assessment THER    Recheck INR In: 2 WEEKS    INR Location Home INR      Anticoagulation Summary as of 4/3/2017     INR goal 2.0-3.0   Today's INR 2.4   Maintenance plan 10 mg (5 mg x 2) on Thu; 7.5 mg (5 mg x 1.5) all other days   Full instructions 10 mg on Thu; 7.5 mg all other days   Weekly total 55 mg   No change documented Salud Bosch RN   Plan last modified Adam Lo, [facility] (9/15/2016)   Next INR check 4/17/2017   Priority INR   Target end date Indefinite    Indications   Pulmonary embolism with infarction (H) [I26.99] [I26.99]  Long-term (current) use of anticoagulants [Z79.01] [Z79.01]         Anticoagulation Episode Summary     INR check location     Preferred lab     Send INR reminders to [facility]    Comments Emergency Contact Janae Nguyen   [phone]  [employer] INFO OK to speak with Mother Janae Mittal [phone]  May leave messages on cell phone  pt prefers to be closer to 3 than 2  Contact Ph [phone]      Anticoagulation Care Providers     Provider Role Specialty Phone number    Sinan Lagunas MD Responsible Hematology [phone]            See the Encounter Report to view Anticoagulation Flowsheet and Dosing Calendar (Go to Encounters tab in chart review, and find the Anticoagulation Therapy Visit)    Spoke with Hayden.  He reports no changes in health, diet, medications.    Salud Bosch RN                Results given to pt

## 2022-05-19 ENCOUNTER — TELEPHONE (OUTPATIENT)
Dept: CARDIOLOGY | Age: 62
End: 2022-05-19

## 2022-05-19 NOTE — TELEPHONE ENCOUNTER
----- Message from Kristina Curtis PA-C sent at 5/19/2022 12:00 PM EDT -----  Please let them know that their stress test was abnromal. We will discuss at follow up. Thanks.

## 2022-05-19 NOTE — PROCEDURES
[facility], [address] [place] [address]                              CARDIAC STRESS TEST    PATIENT NAME: Sadaf Jones                   :        1960  MED REC NO:   [id number]                              ROOM:  ACCOUNT NO:   [de-identified]                           ADMIT DATE: 2022  PROVIDER:     Gael Fullre MD    CARDIOVASCULAR DIAGNOSTIC DEPARTMENT    DATE OF STUDY:  2022    ORDERING PROVIDER:  Hailey Frye PA-C    PRIMARY CARE PROVIDER:  Lynette Duran MD    INTERPRETING PHYSICIAN:  Gladis Guevara. Eugene Montilla MD    EXERCISE MYOCARDIAL PERFUSION STRESS TEST REPORT    Stress/Rest single-isotope SPECT imaging with exercise stress and gated  SPECT imaging    INDICATION:  Assessment of recent chest pain. CLINICAL HISTORY:  The patient is a 59-year-old man with known coronary  artery disease. Previous cardiac history includes:  Stress test, coronary artery bypass  graft. Other previous history includes:  Chest pain, lightheadedness, diabetes  mellitus, caffeine, hypertension, family history of coronary artery  disease <60 in father. Symptoms just prior to testing included:  None. Relevant medications:  Metoprolol (Toprol). PROCEDURE:  The patient performed treadmill exercise using a Reese  protocol, completing 5:37 minutes and completing an estimated workload  of 2.35 metabolic equivalents (METS). The test was terminated due to fatigue and shortness of breath. The heart rate was 63 beats per minute at baseline and increased to 155  beats per minute at peak exercise, which was 98% of the maximum  predicted heart rate. The rest blood pressure was 155/74 mmHg and  increased to 210/64 mmHg, which is a normal response. During the  procedure, the patient developed fatigue, shortness of breath and leg  fatigue, but denied any chest discomfort.     Myocardial perfusion imaging: Imaging was performed at rest 30-45  minutes following the injection of 30 mCi of sestamibi. At peak  exercise, the patient was injected with 30 mCi of sestamibi and exercise  was continued for 1 minute. Gating post-stress tomographic imaging was  performed 30-45 minutes after stress. STRESS ECG RESULTS:  The resting electrocardiogram demonstrated sinus  bradycardia without definitive ST-segment abnormalities suggestive of  myocardial ischemia. At peak exercise and during recovery, the patient developed:    Up-sloping ST segment changes in leads II, III, AVF, V4, V5, V6, which  did meet diagnostic criteria for myocardial ischemia with no premature  atrial contractions (PACs) and occasional premature ventricular  contractions (PVCs). NUCLEAR IMAGING RESULTS:  The overall quality of the study is fair. Mild attenuation artifact was seen. There is no evidence of abnormal  lung uptake. Additionally, the right ventricle appears normal.  The  left ventricular cavity is noted to be normal in size on the stress  images. There is no evidence of transient ischemic dilatation (TID) of  the left ventricle. Gated SPECT imaging reveals normal myocardial thickening and wall motion  with a calculated left ventricular ejection fraction of 58%. The rest images demonstrated a small to moderate perfusion abnormality  of moderate intensity in the inferoseptal and inferoapical regions which  is most likely due to artifact. On stress imaging, a small to moderate perfusion abnormality of moderate  intensity was noted in the inferoseptal and inferoapical regions which  is most likely due to artifact. IMPRESSION:  1. Equivocal myocardial perfusion study. There is a small to moderate  perfusion defect of moderate intensity in the inferoseptal and  inferoapical regions during stress and rest imaging, which is most  consistent with artifact, but may be due to a small degree of coronary  ischemia.     2.  Global left ventricular systolic function was normal with an  ejection fraction of 58%, without regional wall motion abnormalities. 3.  Significant electrocardiographic evidence of myocardial ischemia  during EKG monitoring without significant associated arrhythmias. The patient's Duke Treadmill score is -3, which correlates with an  intermediate risk for significant coronary artery disease. Overall, these results are most consistent with a low to intermediate  risk for significant coronary artery disease. Depending on the patient symptoms and level of clinical suspicion,  aggressive medical management vs. additional testing by coronary  angiography may be indicated. The sensitivity for detecting ischemia on this test may have been  reduced due to the patient being on a beta blocker.         Emily Mtz MD    D: 05/19/2022 9:03:26       T: 05/19/2022 9:04:46     LUCI/HIEN_VICENTE  Job#: [id number]     Doc#: Unknown    CC:  AZUCENA Rodgers MD

## 2022-05-20 ENCOUNTER — TELEPHONE (OUTPATIENT)
Dept: CARDIOLOGY | Age: 62
End: 2022-05-20

## 2022-05-20 NOTE — TELEPHONE ENCOUNTER
Spoke with Abbie Bauman and let him know his stress test was abnormal and that we would go over results at his next visit on 5/31/2022. He verbalized understanding.

## 2022-05-20 NOTE — TELEPHONE ENCOUNTER
----- Message from Tino Velazquez PA-C sent at 5/20/2022 12:11 PM EDT -----  Please let them know that their stress test was abnromal. Will discuss at follow up. Thanks.

## 2022-05-23 ENCOUNTER — TELEPHONE (OUTPATIENT)
Dept: CARDIOLOGY | Age: 62
End: 2022-05-23

## 2022-05-23 ENCOUNTER — OFFICE VISIT (OUTPATIENT)
Dept: NEUROLOGY | Age: 62
End: 2022-05-23
Payer: COMMERCIAL

## 2022-05-23 VITALS
TEMPERATURE: 97.7 F | HEART RATE: 86 BPM | RESPIRATION RATE: 18 BRPM | WEIGHT: 227.2 LBS | SYSTOLIC BLOOD PRESSURE: 134 MMHG | HEIGHT: 69 IN | BODY MASS INDEX: 33.65 KG/M2 | DIASTOLIC BLOOD PRESSURE: 72 MMHG

## 2022-05-23 DIAGNOSIS — G25.81 RLS (RESTLESS LEGS SYNDROME): Primary | ICD-10-CM

## 2022-05-23 PROCEDURE — 99214 OFFICE O/P EST MOD 30 MIN: CPT | Performed by: NEUROMUSCULOSKELETAL MEDICINE, SPORTS MEDICINE

## 2022-05-23 NOTE — PATIENT INSTRUCTIONS
SURVEY:    You may be receiving a survey from [email or web address] regarding your visit today. Please complete the survey to enable us to provide the highest quality of care to you and your family. If you cannot score us a very good on any question, please call the office to discuss how we could have made your experience a very good one. Thank you.

## 2022-05-23 NOTE — PROGRESS NOTES
NEUROLOGY FOLLOW UP    Patient Name:  Uma Sands  :   1960  Clinic Visit Date: 2022    I saw Mr. Uma Sands  in the neurology clinic today for. RLS. No improvement after adding Klonopin. \"Klonopin helps me sleep \". Presently on pramipexole and Cymbalta  which has not been helping. The symptoms of restless leg is present mostly in the evening around 6-7 o'clock, and also present during the daytime. No similar symptoms in the  upper extremities. No history of fatigue, tiredness, muscle pain/ stiffness rigidity or poor balance . REVIEW OF SYSTEMS    Constitutional Weight changes: absent, change in appetite: absent Fatigue: absent; Fevers : absent, Any recent hospitalizations:  absent   HEENT Ears: ringing,  Visual disturbance: absent   Respiratory Shortness of breath: absent, choking:  absent, Cough: absent, Snoring : absent   Cardiovascular Chest pain: absent, Leg swelling :present, palpitations : absent, fainting : absent   GI Constipation: absent, Diarrhea: absent, Swallowing change: absent    Urinary frequency: present, Urinary urgency: present, Urinary incontinence: absent   Musculoskeletal Neck pain: present, Back pain: present, Stiffness: absent, Muscle pain: present, Joint pain: present, restless leg : present   Dermatological Hair loss: present, Skin changes: present   Neurological Confusion: absent, Trouble concentrating: absent, Seizures: absent;  Memory loss: present, balance problem: absent, Dizziness: absent, vertigo: absent, Weakness: absent, Numbness present, Tremor: absent, Spasm: absent, involuntary movement: absent, Speech difficulty: absent, Headache: absent, Light sensitivity: absent   Psychiatric Anxiety: absent, Depression  present, drug abuse: absent, Hallucination: absent, mood disorder: absent, Suicidal ideations absent   Hematologic Abnormal bleeding: absent, Anemia: absent, Lymph gland changes: absent Clotting disorder: absent     Past Medical History: Diagnosis Date    Arthritis     CAD (coronary artery disease)     Cancer ([facility] [address]     Tonsil    Carpal tunnel syndrome 2/9/2018    Diabetes mellitus ([facility] [address] 4/20/2022    Gastroesophageal reflux disease 4/20/2022    Hyperlipidemia     Hypertension     Movement disorder        Past Surgical History:   Procedure Laterality Date    APPENDECTOMY  08/07/2021    laparoscopic    CARDIAC SURGERY      CABG x 3 in 2008    JOINT REPLACEMENT  06/25/2021    RIGHT KNEE SURGERY    LAPAROSCOPIC APPENDECTOMY N/A 08/07/2021    APPENDECTOMY LAPAROSCOPIC performed by Maggi Nunes MD at [address] Marital status:      Spouse name: Not on file    Number of children: Not on file    Years of education: Not on file    Highest education level: Not on file   Occupational History    Not on file   Tobacco Use    Smoking status: Former Smoker     Packs/day: 2.00     Years: 40.00     Pack years: 80.00     Quit date: 2012     Years since quitting: 10.3    Smokeless tobacco: Never Used   Vaping Use    Vaping Use: Never used   Substance and Sexual Activity    Alcohol use: Yes     Comment: Occasional    Drug use: Never    Sexual activity: Not on file   Other Topics Concern    Not on file   Social History Narrative    Not on file     Social Determinants of Health     Financial Resource Strain: Low Risk     Difficulty of Paying Living Expenses: Not hard at all   Food Insecurity: No Food Insecurity    Worried About Running Out of Food in the Last Year: Never true    Walt of Food in the Last Year: Never true   Transportation Needs: No Transportation Needs    Lack of Transportation (Medical): No    Lack of Transportation (Non-Medical):  No   Physical Activity:     Days of Exercise per Week: Not on file    Minutes of Exercise per Session: Not on file   Stress:     Feeling of Stress : Not on file   Social Connections:     Frequency of Communication with Friends and Family: Not on file    Frequency of Social Gatherings with Friends and Family: Not on file    Attends Hinduism Services: Not on file    Active Member of Clubs or Organizations: Not on file    Attends Club or Organization Meetings: Not on file    Marital Status: Not on file   Intimate Partner Violence:     Fear of Current or Ex-Partner: Not on file    Emotionally Abused: Not on file    Physically Abused: Not on file    Sexually Abused: Not on file   Housing Stability:     Unable to Pay for Housing in the Last Year: Not on file    Number of [place] in the Last Year: Not on file    Unstable Housing in the Last Year: Not on file       Family History   Problem Relation Age of Onset    Cancer Mother     Heart Disease Father 76        pacemaker       Current Outpatient Medications   Medication Sig Dispense Refill    ASPIRIN 81 PO Take by mouth daily       furosemide (LASIX) 40 MG tablet Take 40 mg by mouth 2 times daily      lisinopril (PRINIVIL;ZESTRIL) 20 MG tablet Take 20 mg by mouth daily      metoprolol succinate (TOPROL XL) 50 MG extended release tablet Take 25 mg by mouth 2 times daily      pramipexole (MIRAPEX) 1.5 MG tablet Take 1.5 mg by mouth nightly      Multiple Minerals-Vitamins (DAVID-MAG-ZINC-D PO) Take by mouth daily Calcium, Magnesium, and Zinc combo daily      carbidopa-levodopa (SINEMET)  MG per tablet Take 1 tablet by mouth 2 times daily 120 tablet 0    diclofenac sodium (VOLTAREN) 1 % GEL Apply topically 4 times daily as needed for Pain      clonazePAM (KLONOPIN) 0.5 MG tablet Take 1 tablet by mouth nightly for 14 days.  14 tablet 0    ezetimibe (ZETIA) 10 MG tablet Take 1 tablet by mouth daily 90 tablet 1    allopurinol (ZYLOPRIM) 300 MG tablet Take 1 tablet by mouth 2 times daily 180 tablet 0    fenofibrate (TRIGLIDE) 160 MG tablet Take 1 tablet by mouth daily 90 tablet 0    ASPIRIN 81 PO Take by mouth daily       VITAMIN D PO Take 5,000 Units by mouth daily  Multiple Vitamins-Minerals (MENS 50+ MULTI VITAMIN/MIN PO) Take by mouth daily       methocarbamol (ROBAXIN) 500 MG tablet Take 1 tablet by mouth nightly 60 tablet 0    DULoxetine (CYMBALTA) 60 MG extended release capsule Take 60 mg by mouth daily      rosuvastatin (CRESTOR) 20 MG tablet Take 20 mg by mouth daily      omeprazole (PRILOSEC) 20 MG delayed release capsule Take 20 mg by mouth daily      pramipexole (MIRAPEX) 1.5 MG tablet Take 1.5 mg by mouth nightly       No current facility-administered medications for this visit. DATA:  Lab Results   Component Value Date    WBC 4.4 04/19/2022    HGB 13.0 04/19/2022     04/19/2022    CHOL 158 05/02/2022    TRIG 162 (H) 05/02/2022    HDL 37 (L) 05/02/2022    LDLDIRECT 72 04/02/2021    ALT 30 04/19/2022    AST 22 04/19/2022     04/19/2022    K 4.9 04/19/2022     04/19/2022    CREATININE 0.71 04/19/2022    BUN 34 (H) 04/19/2022    CO2 27 04/19/2022    TSH 3.01 04/19/2022    LABA1C 6.5 (H) 04/19/2022    LABMICR Can not be calculated 05/03/2022       /72 (Site: Right Upper Arm, Position: Sitting, Cuff Size: Large Adult)   Pulse 86   Temp 97.7 °F (36.5 °C) (Temporal)   Resp 18   Ht 5' 9\" (1.753 m)   Wt 227 lb 3.2 oz (103.1 kg)   BMI 33.55 kg/m²     NEUROLOGICAL EXAMINATION:     MENTAL STATUS: Normal     CRANIAL NERVES: II-XII are normal    MOTOR EXAMINATION: Muscle tone is normal in all the limbs. Strength is 5/5 in both upper and lower limbs. No abnormal limb movements. SENSORY EXAMINATION: Normal.     STRETCH REFLEXES: Symmetrical in both the upper and lower limbs. GAIT: Normal     IMPRESSION:  Restless Leg Syndrome. No improvement on Cymbalta and pramipexole. Also no improvement with Klonopin started 2 weeks ago. PLAN:    1.  I have started him on Sinemet ( 25/100 mg ), 1 tablet BID. 2.  Continue Cymbalta and pantoprazole for now. Discontinue Klonopin  3.   Follow-up in 2 months    NOTE: This neurology evaluation is part of outpatient coverage at [facility]  1-2 days per week. Patients requiring frequent evaluations or uncomfortable with potential 3-4 day turnaround on questions or calls  may be better served by a neurologist in the area full time. [facility] at [facility]. [place] is available for outpatient visits and procedures including EMG/NCS. [facility] neurologists also practice in [facility] (Dr. Olivia Neff) and [facility] (Jackie Doty).        John Gomez MD   5/23/2022  12:13 PM

## 2022-05-23 NOTE — TELEPHONE ENCOUNTER
----- Message from Chris Nelson PA-C sent at 5/20/2022 12:11 PM EDT -----  Please let them know that their stress test was abnromal. Will discuss at follow up. Thanks.

## 2022-05-24 ENCOUNTER — TELEPHONE (OUTPATIENT)
Dept: NEUROLOGY | Age: 62
End: 2022-05-24

## 2022-05-24 NOTE — TELEPHONE ENCOUNTER
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 Please advise.

## 2022-05-25 ENCOUNTER — HOSPITAL ENCOUNTER (OUTPATIENT)
Dept: CT IMAGING | Age: 62
Discharge: HOME | End: 2022-05-27

## 2022-05-25 DIAGNOSIS — Z87.891 PERSONAL HISTORY OF TOBACCO USE: ICD-10-CM

## 2022-05-25 PROCEDURE — 71271 CT THORAX LUNG CANCER SCR C-: CPT

## 2022-05-26 ENCOUNTER — TELEPHONE (OUTPATIENT)
Dept: PRIMARY CARE CLINIC | Age: 62
End: 2022-05-26

## 2022-05-26 NOTE — TELEPHONE ENCOUNTER
Hello, I would have him contact the Neurology office that started the patient on this - There may be alternatives however, they had recently seen him. Thank you.   Electronically signed by Jan Llanes MD on 5/26/2022 at 9:45 AM

## 2022-05-26 NOTE — TELEPHONE ENCOUNTER
I called Mamadou Ann and informed him that Dr. Isaías Rivera said to try the medication and see how he feels. He should be okay he can always call from [place]. 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 I asked when he leaves for [place]. He said on June 5. I told him to start the medication now and see how he feels. If he feels dizzy, lightheaded, fatigued or sleepy he needs to stop it and call us. Voices understanding.

## 2022-05-26 NOTE — TELEPHONE ENCOUNTER
MD Faviola Dash LPN 3 hours ago (46:07 AM)     KR    Ask him try the medication  and see how he feels. Should be OK.  He can call from [place] if he has a problem

## 2022-05-26 NOTE — TELEPHONE ENCOUNTER
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 He would like to know if there is an alternative to this medication?

## 2022-05-31 ENCOUNTER — OFFICE VISIT (OUTPATIENT)
Dept: CARDIOLOGY | Age: 62
End: 2022-05-31
Payer: COMMERCIAL

## 2022-05-31 VITALS
SYSTOLIC BLOOD PRESSURE: 109 MMHG | DIASTOLIC BLOOD PRESSURE: 67 MMHG | BODY MASS INDEX: 33.34 KG/M2 | HEART RATE: 76 BPM | OXYGEN SATURATION: 99 % | WEIGHT: 225.8 LBS

## 2022-05-31 DIAGNOSIS — I25.10 CORONARY ARTERY DISEASE INVOLVING NATIVE CORONARY ARTERY OF NATIVE HEART WITHOUT ANGINA PECTORIS: ICD-10-CM

## 2022-05-31 DIAGNOSIS — Z87.891 HISTORY OF CIGARETTE SMOKING: ICD-10-CM

## 2022-05-31 DIAGNOSIS — I10 PRIMARY HYPERTENSION: ICD-10-CM

## 2022-05-31 DIAGNOSIS — R42 LIGHTHEADED: ICD-10-CM

## 2022-05-31 DIAGNOSIS — Z95.1 HISTORY OF CORONARY ARTERY BYPASS GRAFT X 3: ICD-10-CM

## 2022-05-31 DIAGNOSIS — R42 DIZZY: ICD-10-CM

## 2022-05-31 DIAGNOSIS — R94.39 ABNORMAL STRESS TEST: ICD-10-CM

## 2022-05-31 DIAGNOSIS — E78.2 MIXED HYPERLIPIDEMIA: ICD-10-CM

## 2022-05-31 PROCEDURE — 99214 OFFICE O/P EST MOD 30 MIN: CPT | Performed by: PHYSICIAN ASSISTANT

## 2022-05-31 NOTE — PATIENT INSTRUCTIONS
SURVEY:    You may be receiving a survey from [employer] regarding your visit today. Please complete the survey to enable us to provide the highest quality of care to you and your family. If you cannot score us a very good on any question, please call the office to discuss how we could have made your experience a very good one. Thank you.
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
## 2022-07-19 ENCOUNTER — TELEPHONE (OUTPATIENT)
Dept: CARDIOLOGY | Age: 62
End: 2022-07-19

## 2022-07-20 ENCOUNTER — TELEPHONE (OUTPATIENT)
Dept: CARDIOLOGY | Age: 62
End: 2022-07-20

## 2022-09-28 ENCOUNTER — APPOINTMENT (OUTPATIENT)
Dept: GENERAL RADIOLOGY | Age: 62
End: 2022-09-28

## 2022-09-28 ENCOUNTER — APPOINTMENT (OUTPATIENT)
Dept: CT IMAGING | Age: 62
End: 2022-09-28

## 2022-09-28 ENCOUNTER — HOSPITAL ENCOUNTER (EMERGENCY)
Age: 62
Discharge: HOME | End: 2022-09-28
Attending: EMERGENCY MEDICINE

## 2022-09-28 VITALS
DIASTOLIC BLOOD PRESSURE: 88 MMHG | HEART RATE: 78 BPM | OXYGEN SATURATION: 97 % | SYSTOLIC BLOOD PRESSURE: 140 MMHG | TEMPERATURE: 96.9 F | RESPIRATION RATE: 16 BRPM

## 2022-09-28 DIAGNOSIS — M54.42 ACUTE LEFT-SIDED LOW BACK PAIN WITH LEFT-SIDED SCIATICA: Primary | ICD-10-CM

## 2022-09-28 PROCEDURE — 96372 THER/PROPH/DIAG INJ SC/IM: CPT

## 2022-09-28 PROCEDURE — 72131 CT LUMBAR SPINE W/O DYE: CPT

## 2022-09-28 PROCEDURE — 73562 X-RAY EXAM OF KNEE 3: CPT | Performed by: RADIOLOGY

## 2022-09-28 PROCEDURE — 99284 EMERGENCY DEPT VISIT MOD MDM: CPT

## 2022-09-28 PROCEDURE — 6360000002 HC RX W HCPCS: Performed by: EMERGENCY MEDICINE

## 2022-09-28 PROCEDURE — 6370000000 HC RX 637 (ALT 250 FOR IP): Performed by: EMERGENCY MEDICINE

## 2022-09-28 PROCEDURE — 72192 CT PELVIS W/O DYE: CPT

## 2022-09-28 RX ORDER — CYCLOBENZAPRINE HCL 10 MG
10 TABLET ORAL 3 TIMES DAILY PRN
Qty: 21 TABLET | Refills: 0 | Status: SHIPPED | OUTPATIENT
Start: 2022-09-28 | End: 2022-10-08

## 2022-09-28 RX ORDER — DIAZEPAM 5 MG/1
5 TABLET ORAL ONCE
Status: COMPLETED | OUTPATIENT
Start: 2022-09-28 | End: 2022-09-28

## 2022-09-28 RX ORDER — HYDROCODONE BITARTRATE AND ACETAMINOPHEN 5; 325 MG/1; MG/1
1 TABLET ORAL ONCE
Status: COMPLETED | OUTPATIENT
Start: 2022-09-28 | End: 2022-09-28

## 2022-09-28 RX ORDER — METHYLPREDNISOLONE SODIUM SUCCINATE 125 MG/2ML
125 INJECTION, POWDER, LYOPHILIZED, FOR SOLUTION INTRAMUSCULAR; INTRAVENOUS ONCE
Status: COMPLETED | OUTPATIENT
Start: 2022-09-28 | End: 2022-09-28

## 2022-09-28 RX ORDER — METHYLPREDNISOLONE 4 MG/1
TABLET ORAL
Qty: 21 TABLET | Refills: 0 | Status: SHIPPED | OUTPATIENT
Start: 2022-09-28 | End: 2022-10-04

## 2022-09-28 RX ADMIN — METHYLPREDNISOLONE SODIUM SUCCINATE 125 MG: 125 INJECTION, POWDER, FOR SOLUTION INTRAMUSCULAR; INTRAVENOUS at 15:46

## 2022-09-28 RX ADMIN — HYDROCODONE BITARTRATE AND ACETAMINOPHEN 1 TABLET: 5; 325 TABLET ORAL at 16:58

## 2022-09-28 RX ADMIN — DIAZEPAM 5 MG: 5 TABLET ORAL at 15:46

## 2022-09-28 ASSESSMENT — PAIN DESCRIPTION - LOCATION
LOCATION: BACK
LOCATION: BACK
LOCATION: BACK;KNEE
LOCATION: BACK

## 2022-09-28 ASSESSMENT — PAIN DESCRIPTION - ORIENTATION
ORIENTATION: LOWER
ORIENTATION: LEFT

## 2022-09-28 ASSESSMENT — PAIN SCALES - GENERAL
PAINLEVEL_OUTOF10: 1
PAINLEVEL_OUTOF10: 10
PAINLEVEL_OUTOF10: 7
PAINLEVEL_OUTOF10: 1

## 2022-09-28 ASSESSMENT — PAIN DESCRIPTION - DESCRIPTORS
DESCRIPTORS: DISCOMFORT
DESCRIPTORS: DISCOMFORT

## 2022-09-28 ASSESSMENT — PAIN - FUNCTIONAL ASSESSMENT: PAIN_FUNCTIONAL_ASSESSMENT: 0-10

## 2022-09-28 NOTE — DISCHARGE INSTRUCTIONS
Take Medrol Dosepak as directed until complete. Flexeril as you did for muscle spasms. Tylenol as needed for pain. Use over-the-counter leg such as ThermaCare patches IcyHot Biofreeze Tiger balm or similar as needed and directed. Follow-up with your primary care provider in 2 to 3 days for reevaluation.   Return immediately for any worsening symptoms any loss of bowel or bladder function remedy weakness numbness tingling or any other acute concerns

## 2022-09-28 NOTE — ED PROVIDER NOTES
[facility] ED  EMERGENCY DEPARTMENT ENCOUNTER      Pt Name: Alejandra Hammer  MRN: [id number]  [place] 1960  Date of evaluation: 9/28/2022  Provider: Josesito Laird MD    CHIEF COMPLAINT       Chief Complaint   Patient presents with    Back Injury    Knee Injury     Left lower back and left knee pain for past 2.5 weeks. Patient injured himself at that time while getting in his vehicle. HISTORY OF PRESENT ILLNESS   (Location/Symptom, Timing/Onset, Context/Setting, Quality, Duration, Modifying Factors, Severity)  Note limiting factors. Alejandra Hammer is a 60 y.o. male who presents to the emergency department      59 yo male with low back pain radiating down left leg and left knee pain. Symptoms began suddenly about 2 and a half weeks prior while steeping out of his truck. No fall but sudden pain in his low back radiating down the posterior aspect of his left leg. Pain worse with standing and walking. Also has pain in left knee. History of lumbar surgery and left knee replacement. No bowel or bladder dysfunction. No weakness      Nursing Notes were reviewed. REVIEW OF SYSTEMS    (2-9 systems for level 4, 10 or more for level 5)     Review of Systems   All other systems reviewed and are negative. Except as noted above the remainder of the review of systems was reviewed and negative.        PAST MEDICAL HISTORY     Past Medical History:   Diagnosis Date    Arthritis     CAD (coronary artery disease)     Cancer ([address]     Tonsil    Carpal tunnel syndrome 2/9/2018    Diabetes mellitus ([address] 4/20/2022    Gastroesophageal reflux disease 4/20/2022    Hyperlipidemia     Hypertension     Movement disorder          SURGICAL HISTORY       Past Surgical History:   Procedure Laterality Date    APPENDECTOMY  08/07/2021    laparoscopic    CARDIAC SURGERY      CABG x 3 in 2008    JOINT REPLACEMENT  06/25/2021    RIGHT KNEE SURGERY    LAPAROSCOPIC APPENDECTOMY N/A 08/07/2021    APPENDECTOMY LAPAROSCOPIC performed by Claus Howell MD at [address]       Discharge Medication List as of 9/28/2022  6:03 PM        CONTINUE these medications which have NOT CHANGED    Details   Multiple Minerals-Vitamins (DAVID-MAG-ZINC-D PO) Take by mouth daily Calcium, Magnesium, and Zinc combo dailyHistorical Med      carbidopa-levodopa (SINEMET)  MG per tablet Take 1 tablet by mouth 2 times daily, Disp-120 tablet, R-0Normal      diclofenac sodium (VOLTAREN) 1 % GEL Apply topically 4 times daily as needed for Pain, Topical, 4 TIMES DAILY PRN, Historical Med      ezetimibe (ZETIA) 10 MG tablet Take 1 tablet by mouth daily, Disp-90 tablet, R-1Normal      allopurinol (ZYLOPRIM) 300 MG tablet Take 1 tablet by mouth 2 times daily, Disp-180 tablet, R-0Normal      fenofibrate (TRIGLIDE) 160 MG tablet Take 1 tablet by mouth daily, Disp-90 tablet, R-0Normal      ASPIRIN 81 PO Take by mouth daily Historical Med      VITAMIN D PO Take 5,000 Units by mouth daily Historical Med      Multiple Vitamins-Minerals (MENS 50+ MULTI VITAMIN/MIN PO) Take by mouth daily Historical Med      methocarbamol (ROBAXIN) 500 MG tablet Take 1 tablet by mouth nightly, Disp-60 tablet, R-0Normal      DULoxetine (CYMBALTA) 60 MG extended release capsule Take 60 mg by mouth dailyHistorical Med      rosuvastatin (CRESTOR) 20 MG tablet Take 20 mg by mouth dailyHistorical Med      omeprazole (PRILOSEC) 20 MG delayed release capsule Take 20 mg by mouth dailyHistorical Med      furosemide (LASIX) 40 MG tablet Take 40 mg by mouth 2 times dailyHistorical Med      lisinopril (PRINIVIL;ZESTRIL) 20 MG tablet Take 20 mg by mouth dailyHistorical Med      metoprolol succinate (TOPROL XL) 50 MG extended release tablet Take 25 mg by mouth 2 times dailyHistorical Med      pramipexole (MIRAPEX) 1.5 MG tablet Take 1.5 mg by mouth nightlyHistorical Med             ALLERGIES     Penicillins and Statins    FAMILY HISTORY       Family History Problem Relation Age of Onset    Cancer Mother     Heart Disease Father 76        pacemaker          SOCIAL HISTORY       Social History     Socioeconomic History    Marital status:      Spouse name: None    Number of children: None    Years of education: None    Highest education level: None   Tobacco Use    Smoking status: Former     Packs/day: 2.00     Years: 40.00     Pack years: 80.00     Types: Cigarettes     Quit date: 2012     Years since quitting: 10.7    Smokeless tobacco: Never   Vaping Use    Vaping Use: Never used   Substance and Sexual Activity    Alcohol use: Yes     Comment: Occasional    Drug use: Never     Social Determinants of Health     Financial Resource Strain: Low Risk     Difficulty of Paying Living Expenses: Not hard at all   Food Insecurity: No Food Insecurity    Worried About Running Out of Food in the Last Year: Never true    [address] in the Last Year: Never true   Transportation Needs: No Transportation Needs    Lack of Transportation (Medical): No    Lack of Transportation (Non-Medical): No       SCREENINGS        [place] Coma Scale  Eye Opening: Spontaneous  Best Verbal Response: Oriented  Best Motor Response: Obeys commands  [place] Coma Scale Score: 15               PHYSICAL EXAM    (up to 7 for level 4, 8 or more for level 5)     ED Triage Vitals [09/28/22 1326]   BP Temp Temp Source Heart Rate Resp SpO2 Height Weight   (!) 149/69 96.9 °F (36.1 °C) Tympanic 84 18 97 % -- --       Physical Exam  Vitals and nursing note reviewed. Constitutional:       Comments: Uncomfortable but in no acute distress   HENT:      Head: Normocephalic and atraumatic. Cardiovascular:      Rate and Rhythm: Normal rate and regular rhythm. Pulmonary:      Effort: Pulmonary effort is normal. No respiratory distress. Breath sounds: Normal breath sounds. Abdominal:      General: There is no distension. Palpations: Abdomen is soft. Tenderness:  There is no abdominal tenderness. Musculoskeletal:         General: No swelling or tenderness. Cervical back: Normal range of motion. Comments: Lateral lumbar muscular tenderness. Positive straight leg raise on left. Left knee some prepatellar swelling without redness or warmth. Skin:     General: Skin is warm and dry. Neurological:      General: No focal deficit present. Mental Status: He is alert and oriented to person, place, and time. DIAGNOSTIC RESULTS     EKG: All EKG's are interpreted by the Emergency Department Physician who either signs or Co-signs this chart in the absence of a cardiologist.        RADIOLOGY:   Non-plain film images such as CT, Ultrasound and MRI are read by the radiologist. Plain radiographic images are visualized and preliminarily interpreted by the emergency physician with the below findings:        Interpretation per the Radiologist below, if available at the time of this note:    XR KNEE LEFT (3 VIEWS)   Final Result   1. No acute bony findings in the left knee. 2. Left knee total arthroplasty with no evident complication. 3. Trace to small left suprapatellar effusion of indeterminate etiology. 4. Possible intra-articular loose bodies in the posterior recess of the left   knee joint. CT LUMBAR SPINE WO CONTRAST   Final Result   1. No evidence of acute osseous abnormality in the lumbar spine or pelvis. 2.  Fixation hardware at L4-L5. 3.  Mild-to-moderate degenerative changes in the lumbar spine. Mild   degenerative changes at the hips. CT PELVIS WO CONTRAST Additional Contrast? None   Final Result   1. No evidence of acute osseous abnormality in the lumbar spine or pelvis. 2.  Fixation hardware at L4-L5. 3.  Mild-to-moderate degenerative changes in the lumbar spine. Mild   degenerative changes at the hips.                ED BEDSIDE ULTRASOUND:   Performed by ED Physician - none    LABS:  Labs Reviewed - No data to display    All other labs were within normal range or not returned as of this dictation. EMERGENCY DEPARTMENT COURSE and DIFFERENTIAL DIAGNOSIS/MDM:   Vitals:    Vitals:    09/28/22 1326 09/28/22 1858   BP: (!) 149/69 (!) 140/88   Pulse: 84 78   Resp: 18 16   Temp: 96.9 °F (36.1 °C)    TempSrc: Tympanic    SpO2: 97%            MDM  Number of Diagnoses or Management Options  Acute left-sided low back pain with left-sided sciatica  Diagnosis management comments: 59 yo male with left sided sciatica and knee pain. No acute findings on CT or xr per radiology read. Stable for Discharge home. ED return and follow up discussed. MIPS       REASSESSMENT          CRITICAL CARE TIME   Total Critical Care time was  minutes, excluding separately reportable procedures. There was a high probability of clinically significant/life threatening deterioration in the patient's condition which required my urgent intervention. CONSULTS:  None    PROCEDURES:  Unless otherwise noted below, none     Procedures        FINAL IMPRESSION      1. Acute left-sided low back pain with left-sided sciatica          DISPOSITION/PLAN   DISPOSITION Decision To Discharge 09/28/2022 05:59:38 PM      PATIENT REFERRED TO:  Josi Cheek MD  [address]  [address]  [facility] [phone]      2-3 days for recheck      DISCHARGE MEDICATIONS:  Discharge Medication List as of 9/28/2022  6:03 PM        START taking these medications    Details   methylPREDNISolone (MEDROL, SERGIO,) 4 MG tablet Take by mouth., Disp-21 tablet, R-0Normal      cyclobenzaprine (FLEXERIL) 10 MG tablet Take 1 tablet by mouth 3 times daily as needed for Muscle spasms, Disp-21 tablet, R-0Normal           Controlled Substances Monitoring:     No flowsheet data found.     (Please note that portions of this note were completed with a voice recognition program.  Efforts were made to edit the dictations but occasionally words are mis-transcribed.)    Rolando Li MD (electronically signed)  Attending Emergency Physician           Arash Ellsworth MD  10/03/22 [id number]

## 2022-10-18 RX ORDER — METOPROLOL SUCCINATE 50 MG/1
25 TABLET, EXTENDED RELEASE ORAL 2 TIMES DAILY
Qty: 90 TABLET | Refills: 0 | Status: SHIPPED | OUTPATIENT
Start: 2022-10-18

## 2022-10-18 NOTE — TELEPHONE ENCOUNTER
Most recent cardiology note states the following: \"Beta Blocker: Continue Metoprolol succinate (Toprol XL) 50 mg daily. \". This is different from the pended rx. Please confirm and pend accurate dosing.

## 2022-12-13 ENCOUNTER — HOSPITAL ENCOUNTER (OUTPATIENT)
Age: 62
Discharge: HOME | End: 2022-12-15
Payer: COMMERCIAL

## 2022-12-13 ENCOUNTER — HOSPITAL ENCOUNTER (OUTPATIENT)
Dept: GENERAL RADIOLOGY | Age: 62
Discharge: HOME | End: 2022-12-15
Payer: COMMERCIAL

## 2022-12-13 DIAGNOSIS — M19.071 ARTHRITIS OF RIGHT FOOT: ICD-10-CM

## 2022-12-13 PROCEDURE — 73630 X-RAY EXAM OF FOOT: CPT

## 2023-01-18 ENCOUNTER — HOSPITAL ENCOUNTER (OUTPATIENT)
Dept: PREADMISSION TESTING | Age: 63
Discharge: HOME | End: 2023-01-18
Payer: COMMERCIAL

## 2023-01-18 VITALS
RESPIRATION RATE: 16 BRPM | HEIGHT: 69 IN | WEIGHT: 253.6 LBS | SYSTOLIC BLOOD PRESSURE: 142 MMHG | TEMPERATURE: 97.7 F | OXYGEN SATURATION: 96 % | DIASTOLIC BLOOD PRESSURE: 78 MMHG | HEART RATE: 80 BPM | BODY MASS INDEX: 37.56 KG/M2

## 2023-01-18 LAB
ANION GAP SERPL CALCULATED.3IONS-SCNC: 10 MMOL/L (ref 9–17)
BUN BLDV-MCNC: 28 MG/DL (ref 8–23)
BUN/CREAT BLD: 32 (ref 9–20)
CALCIUM SERPL-MCNC: 10 MG/DL (ref 8.6–10.4)
CHLORIDE BLD-SCNC: 102 MMOL/L (ref 98–107)
CO2: 25 MMOL/L (ref 20–31)
CREAT SERPL-MCNC: 0.88 MG/DL (ref 0.7–1.2)
EKG ATRIAL RATE: 85 BPM
EKG P AXIS: 55 DEGREES
EKG P-R INTERVAL: 172 MS
EKG Q-T INTERVAL: 390 MS
EKG QRS DURATION: 104 MS
EKG QTC CALCULATION (BAZETT): 464 MS
EKG R AXIS: 39 DEGREES
EKG T AXIS: 32 DEGREES
EKG VENTRICULAR RATE: 85 BPM
GFR SERPL CREATININE-BSD FRML MDRD: >60 ML/MIN/1.73M2
GLUCOSE BLD-MCNC: 176 MG/DL (ref 70–99)
HCT VFR BLD CALC: 40.1 % (ref 40.7–50.3)
HEMOGLOBIN: 13.3 G/DL (ref 13–17)
MCH RBC QN AUTO: 30 PG (ref 25.2–33.5)
MCHC RBC AUTO-ENTMCNC: 33.2 G/DL (ref 28.4–34.8)
MCV RBC AUTO: 90.5 FL (ref 82.6–102.9)
NRBC AUTOMATED: 0 PER 100 WBC
PDW BLD-RTO: 12.6 % (ref 11.8–14.4)
PLATELET # BLD: 168 K/UL (ref 138–453)
PMV BLD AUTO: 9.6 FL (ref 8.1–13.5)
POTASSIUM SERPL-SCNC: 4.6 MMOL/L (ref 3.7–5.3)
RBC # BLD: 4.43 M/UL (ref 4.21–5.77)
SODIUM BLD-SCNC: 137 MMOL/L (ref 135–144)
WBC # BLD: 6.4 K/UL (ref 3.5–11.3)

## 2023-01-18 PROCEDURE — 80048 BASIC METABOLIC PNL TOTAL CA: CPT

## 2023-01-18 PROCEDURE — 93010 ELECTROCARDIOGRAM REPORT: CPT | Performed by: INTERNAL MEDICINE

## 2023-01-18 PROCEDURE — 85027 COMPLETE CBC AUTOMATED: CPT

## 2023-01-18 PROCEDURE — 36415 COLL VENOUS BLD VENIPUNCTURE: CPT

## 2023-01-18 PROCEDURE — 93005 ELECTROCARDIOGRAM TRACING: CPT | Performed by: PODIATRIST

## 2023-01-18 ASSESSMENT — PAIN - FUNCTIONAL ASSESSMENT: PAIN_FUNCTIONAL_ASSESSMENT: PREVENTS OR INTERFERES SOME ACTIVE ACTIVITIES AND ADLS

## 2023-01-18 ASSESSMENT — PAIN DESCRIPTION - LOCATION: LOCATION: FOOT

## 2023-01-18 ASSESSMENT — PAIN DESCRIPTION - ORIENTATION: ORIENTATION: LEFT;RIGHT

## 2023-01-18 NOTE — PROGRESS NOTES
[facility]   Preadmission Testing    Name: Uma Sands  : 1960  Patient Phone: [phone] (home) [phone] (work)    Procedure: RIGHT TOTAL TOE ARTHROPLASTY   Date of Procedure: 23  Surgeon: DR Deshpande Son    Ht:  5' 9\" (175.3 cm)  Wt: 253 lb 9.6 oz (115 kg)  Wt method: Actual    Allergies: Allergies   Allergen Reactions    Penicillins Rash    Statins Rash       Peanut allergy: No         Vitals:    23 0919   BP: (!) 142/78   Pulse: 80   Resp: 16   Temp: 97.7 °F (36.5 °C)   SpO2: 96%       No LMP for male patient. Do you take blood thinners? [x] Yes    [] No      ASA 81mg     Instructed to stop blood thinners prior to procedure? [x] Yes    [] No      [] N/A  Dr Barrie Coto instructed to stop 5 days    Do you have sleep apnea? [] Yes    [x] No     Instructed to bring CPAP machine? [] Yes    [] No    [x] N/A   Do you have acid reflux ? [x] Yes    [] No     Do you have  hiatal hernia? [] Yes    [x] No    Do you ever experience motion sickness? [] Yes    [x] No     Have you had a respiratory infection or sore throat in last 4 weeks before surgery? [] Yes    [x] No     Do you have poorly controlled asthma or COPD? [] Yes    [x] No     Do you have a history of angina in the last month or symptomatic arrhythmia? [] Yes    [x] No     Do you have significant central nervous system disease? [] Yes    [x] No     Have you had an EKG, labs, or chest xray in last 12 months? If yes provide copies to anesthesia   [x] Yes    [] No       [x] Lab    [x] EKG    [] CXR  pending     Have you had a stress test?     [x] Yes    [] No    When/where: failed stress test prior to CABG x 3 -     Was it normal?    [] Yes    [] No       Do you or your family have a history of Malignant Hyperthermia?  [] Yes    [x] No     Patient instructed on:   [x] NPO Status   [x] Meds to Take Day of Surgery  [x] Ride Home  [x]No Jewelry/Contact Lenses/Dentures day of surgery   [x] Chlorhexidene             PAT Call/Visit Questions  Person Interviewed: self  Relationship to Patient: Patient  Surgery Time Verified: Yes  Surgery Location Verified: Yes  NPO Status Reinforced: Yes  Ride and Caregiver Arranged: Yes  Ride Caregiver Provider: wife  CHG Bottle/Wipes provided with instructions for use: Yes    Pre-AdmissionTesting Checklist  History and Physical Collected?: No (to be done DOS)  Communication Needs: None  Pre-existing DNR Comfort Care/DNR Arrest/DNI Order: No    Patient instructed on the pre-operative, intra-operative, and post-operative process? Yes  Medication instructions reviewed with patient? Yes  Pre operative instruction sheet reviewed and given to patient in PAT?   Yes

## 2023-01-18 NOTE — PROGRESS NOTES
Patient instructed on the pre-operative, intra-operative, and post-operative process. Patient's surgical procedure and day of surgery confirmed. Patient instructed on NPO status. Medication instructions reviewed with patient, patient instructed to hold aspirin for 5 days per Dr Cole Zhang and to stop garlic, vitamin D and multivitamin for one week. Patient instructed to take rosuvastatin, metoprolol and omeprazole on the day of surgery with small sip of water. CHG pre-operative wash instructions reviewed with patient. Pre operative instruction sheet reviewed with patient per PAT in person visit. Patient voiced understanding and denies any questions at this time. Anesthesia to review EKG results and lab results.

## 2023-01-19 ENCOUNTER — ANESTHESIA EVENT (OUTPATIENT)
Dept: OPERATING ROOM | Age: 63
End: 2023-01-19
Payer: COMMERCIAL

## 2023-01-19 NOTE — PROGRESS NOTES
Patient's chart and EKG reviewed per PAT request.  No significant changes on EKG from 1/18/2023 when compared with EKG from 4/19/2022. Cleared by anesthesia to proceed with surgery on 1/26/2023.

## 2023-01-26 ENCOUNTER — ANESTHESIA (OUTPATIENT)
Dept: OPERATING ROOM | Age: 63
End: 2023-01-26
Payer: COMMERCIAL

## 2023-01-26 ENCOUNTER — HOSPITAL ENCOUNTER (OUTPATIENT)
Age: 63
Setting detail: OUTPATIENT SURGERY
Discharge: HOME | End: 2023-01-26
Attending: PODIATRIST | Admitting: PODIATRIST
Payer: COMMERCIAL

## 2023-01-26 VITALS
BODY MASS INDEX: 37.47 KG/M2 | HEART RATE: 81 BPM | DIASTOLIC BLOOD PRESSURE: 55 MMHG | TEMPERATURE: 97.2 F | WEIGHT: 253 LBS | SYSTOLIC BLOOD PRESSURE: 128 MMHG | HEIGHT: 69 IN | OXYGEN SATURATION: 95 % | RESPIRATION RATE: 20 BRPM

## 2023-01-26 DIAGNOSIS — M20.21 HALLUX RIGIDUS OF RIGHT FOOT: ICD-10-CM

## 2023-01-26 LAB — GLUCOSE BLD-MCNC: 159 MG/DL (ref 74–100)

## 2023-01-26 PROCEDURE — 88304 TISSUE EXAM BY PATHOLOGIST: CPT

## 2023-01-26 PROCEDURE — 2580000003 HC RX 258

## 2023-01-26 PROCEDURE — 6370000000 HC RX 637 (ALT 250 FOR IP): Performed by: NURSE ANESTHETIST, CERTIFIED REGISTERED

## 2023-01-26 PROCEDURE — 2709999900 HC NON-CHARGEABLE SUPPLY: Performed by: PODIATRIST

## 2023-01-26 PROCEDURE — 3600000003 HC SURGERY LEVEL 3 BASE: Performed by: PODIATRIST

## 2023-01-26 PROCEDURE — 3600000013 HC SURGERY LEVEL 3 ADDTL 15MIN: Performed by: PODIATRIST

## 2023-01-26 PROCEDURE — 88311 DECALCIFY TISSUE: CPT

## 2023-01-26 PROCEDURE — 7100000010 HC PHASE II RECOVERY - FIRST 15 MIN: Performed by: PODIATRIST

## 2023-01-26 PROCEDURE — 6360000002 HC RX W HCPCS: Performed by: NURSE ANESTHETIST, CERTIFIED REGISTERED

## 2023-01-26 PROCEDURE — 82947 ASSAY GLUCOSE BLOOD QUANT: CPT

## 2023-01-26 PROCEDURE — 3700000001 HC ADD 15 MINUTES (ANESTHESIA): Performed by: PODIATRIST

## 2023-01-26 PROCEDURE — 6370000000 HC RX 637 (ALT 250 FOR IP)

## 2023-01-26 PROCEDURE — C1776 JOINT DEVICE (IMPLANTABLE): HCPCS | Performed by: PODIATRIST

## 2023-01-26 PROCEDURE — 6360000002 HC RX W HCPCS: Performed by: PODIATRIST

## 2023-01-26 PROCEDURE — 2500000003 HC RX 250 WO HCPCS: Performed by: PODIATRIST

## 2023-01-26 PROCEDURE — 3700000000 HC ANESTHESIA ATTENDED CARE: Performed by: PODIATRIST

## 2023-01-26 PROCEDURE — 2580000003 HC RX 258: Performed by: NURSE ANESTHETIST, CERTIFIED REGISTERED

## 2023-01-26 PROCEDURE — 2580000003 HC RX 258: Performed by: PODIATRIST

## 2023-01-26 PROCEDURE — 7100000001 HC PACU RECOVERY - ADDTL 15 MIN: Performed by: PODIATRIST

## 2023-01-26 PROCEDURE — 2500000003 HC RX 250 WO HCPCS: Performed by: NURSE ANESTHETIST, CERTIFIED REGISTERED

## 2023-01-26 PROCEDURE — 7100000011 HC PHASE II RECOVERY - ADDTL 15 MIN: Performed by: PODIATRIST

## 2023-01-26 PROCEDURE — 7100000000 HC PACU RECOVERY - FIRST 15 MIN: Performed by: PODIATRIST

## 2023-01-26 DEVICE — IMPLANTABLE DEVICE
Type: IMPLANTABLE DEVICE | Site: FIRST TOE | Status: FUNCTIONAL
Brand: SWANSON

## 2023-01-26 RX ORDER — KETOROLAC TROMETHAMINE 30 MG/ML
INJECTION, SOLUTION INTRAMUSCULAR; INTRAVENOUS PRN
Status: DISCONTINUED | OUTPATIENT
Start: 2023-01-26 | End: 2023-01-26 | Stop reason: SDUPTHER

## 2023-01-26 RX ORDER — FENTANYL CITRATE 50 UG/ML
INJECTION, SOLUTION INTRAMUSCULAR; INTRAVENOUS PRN
Status: DISCONTINUED | OUTPATIENT
Start: 2023-01-26 | End: 2023-01-26 | Stop reason: SDUPTHER

## 2023-01-26 RX ORDER — FENTANYL CITRATE 50 UG/ML
50 INJECTION, SOLUTION INTRAMUSCULAR; INTRAVENOUS EVERY 5 MIN PRN
Status: DISCONTINUED | OUTPATIENT
Start: 2023-01-26 | End: 2023-01-26 | Stop reason: HOSPADM

## 2023-01-26 RX ORDER — CLINDAMYCIN PHOSPHATE 900 MG/50ML
900 INJECTION INTRAVENOUS ONCE
Status: COMPLETED | OUTPATIENT
Start: 2023-01-26 | End: 2023-01-26

## 2023-01-26 RX ORDER — HYDRALAZINE HYDROCHLORIDE 20 MG/ML
10 INJECTION INTRAMUSCULAR; INTRAVENOUS
Status: DISCONTINUED | OUTPATIENT
Start: 2023-01-26 | End: 2023-01-26 | Stop reason: HOSPADM

## 2023-01-26 RX ORDER — LIDOCAINE HYDROCHLORIDE 20 MG/ML
INJECTION, SOLUTION EPIDURAL; INFILTRATION; INTRACAUDAL; PERINEURAL PRN
Status: DISCONTINUED | OUTPATIENT
Start: 2023-01-26 | End: 2023-01-26 | Stop reason: SDUPTHER

## 2023-01-26 RX ORDER — LABETALOL HYDROCHLORIDE 5 MG/ML
10 INJECTION, SOLUTION INTRAVENOUS
Status: DISCONTINUED | OUTPATIENT
Start: 2023-01-26 | End: 2023-01-26 | Stop reason: HOSPADM

## 2023-01-26 RX ORDER — ONDANSETRON 2 MG/ML
INJECTION INTRAMUSCULAR; INTRAVENOUS PRN
Status: DISCONTINUED | OUTPATIENT
Start: 2023-01-26 | End: 2023-01-26 | Stop reason: SDUPTHER

## 2023-01-26 RX ORDER — DIMENHYDRINATE 50 MG/1
50 TABLET ORAL ONCE
Status: COMPLETED | OUTPATIENT
Start: 2023-01-26 | End: 2023-01-26

## 2023-01-26 RX ORDER — OXYCODONE HYDROCHLORIDE 5 MG/1
5 TABLET ORAL PRN
Status: COMPLETED | OUTPATIENT
Start: 2023-01-26 | End: 2023-01-26

## 2023-01-26 RX ORDER — OXYCODONE HYDROCHLORIDE 5 MG/1
10 TABLET ORAL PRN
Status: COMPLETED | OUTPATIENT
Start: 2023-01-26 | End: 2023-01-26

## 2023-01-26 RX ORDER — FENTANYL CITRATE 50 UG/ML
25 INJECTION, SOLUTION INTRAMUSCULAR; INTRAVENOUS EVERY 5 MIN PRN
Status: DISCONTINUED | OUTPATIENT
Start: 2023-01-26 | End: 2023-01-26 | Stop reason: HOSPADM

## 2023-01-26 RX ORDER — SODIUM CHLORIDE 9 MG/ML
INJECTION, SOLUTION INTRAVENOUS PRN
Status: DISCONTINUED | OUTPATIENT
Start: 2023-01-26 | End: 2023-01-26 | Stop reason: HOSPADM

## 2023-01-26 RX ORDER — PROPOFOL 10 MG/ML
INJECTION, EMULSION INTRAVENOUS CONTINUOUS PRN
Status: DISCONTINUED | OUTPATIENT
Start: 2023-01-26 | End: 2023-01-26 | Stop reason: SDUPTHER

## 2023-01-26 RX ORDER — SODIUM CHLORIDE 0.9 % (FLUSH) 0.9 %
5-40 SYRINGE (ML) INJECTION PRN
Status: DISCONTINUED | OUTPATIENT
Start: 2023-01-26 | End: 2023-01-26 | Stop reason: HOSPADM

## 2023-01-26 RX ORDER — ACETAMINOPHEN 325 MG/1
650 TABLET ORAL ONCE
Status: COMPLETED | OUTPATIENT
Start: 2023-01-26 | End: 2023-01-26

## 2023-01-26 RX ORDER — SODIUM CHLORIDE, SODIUM LACTATE, POTASSIUM CHLORIDE, CALCIUM CHLORIDE 600; 310; 30; 20 MG/100ML; MG/100ML; MG/100ML; MG/100ML
INJECTION, SOLUTION INTRAVENOUS CONTINUOUS PRN
Status: DISCONTINUED | OUTPATIENT
Start: 2023-01-26 | End: 2023-01-26 | Stop reason: SDUPTHER

## 2023-01-26 RX ORDER — SODIUM CHLORIDE 0.9 % (FLUSH) 0.9 %
5-40 SYRINGE (ML) INJECTION EVERY 12 HOURS SCHEDULED
Status: DISCONTINUED | OUTPATIENT
Start: 2023-01-26 | End: 2023-01-26 | Stop reason: HOSPADM

## 2023-01-26 RX ORDER — SODIUM CHLORIDE, SODIUM LACTATE, POTASSIUM CHLORIDE, CALCIUM CHLORIDE 600; 310; 30; 20 MG/100ML; MG/100ML; MG/100ML; MG/100ML
INJECTION, SOLUTION INTRAVENOUS CONTINUOUS
Status: DISCONTINUED | OUTPATIENT
Start: 2023-01-26 | End: 2023-01-26 | Stop reason: HOSPADM

## 2023-01-26 RX ADMIN — ACETAMINOPHEN 650 MG: 325 TABLET ORAL at 07:32

## 2023-01-26 RX ADMIN — CLINDAMYCIN PHOSPHATE 900 MG: 900 INJECTION, SOLUTION INTRAVENOUS at 08:04

## 2023-01-26 RX ADMIN — PROPOFOL 50 MG: 10 INJECTION, EMULSION INTRAVENOUS at 08:12

## 2023-01-26 RX ADMIN — PROPOFOL 200 MCG/KG/MIN: 10 INJECTION, EMULSION INTRAVENOUS at 08:11

## 2023-01-26 RX ADMIN — FENTANYL CITRATE 25 MCG: 50 INJECTION INTRAMUSCULAR; INTRAVENOUS at 08:15

## 2023-01-26 RX ADMIN — PHENYLEPHRINE HYDROCHLORIDE 100 MCG: 10 INJECTION INTRAVENOUS at 08:47

## 2023-01-26 RX ADMIN — PROPOFOL 100 MG: 10 INJECTION, EMULSION INTRAVENOUS at 08:27

## 2023-01-26 RX ADMIN — PHENYLEPHRINE HYDROCHLORIDE 200 MCG: 10 INJECTION INTRAVENOUS at 08:44

## 2023-01-26 RX ADMIN — SODIUM CHLORIDE, POTASSIUM CHLORIDE, SODIUM LACTATE AND CALCIUM CHLORIDE: 600; 310; 30; 20 INJECTION, SOLUTION INTRAVENOUS at 08:07

## 2023-01-26 RX ADMIN — SODIUM CHLORIDE, POTASSIUM CHLORIDE, SODIUM LACTATE AND CALCIUM CHLORIDE: 600; 310; 30; 20 INJECTION, SOLUTION INTRAVENOUS at 07:31

## 2023-01-26 RX ADMIN — KETOROLAC TROMETHAMINE 30 MG: 30 INJECTION, SOLUTION INTRAMUSCULAR at 09:22

## 2023-01-26 RX ADMIN — PROPOFOL 40 MG: 10 INJECTION, EMULSION INTRAVENOUS at 08:16

## 2023-01-26 RX ADMIN — PHENYLEPHRINE HYDROCHLORIDE 100 MCG: 10 INJECTION INTRAVENOUS at 08:38

## 2023-01-26 RX ADMIN — FENTANYL CITRATE 25 MCG: 50 INJECTION INTRAMUSCULAR; INTRAVENOUS at 08:22

## 2023-01-26 RX ADMIN — PHENYLEPHRINE HYDROCHLORIDE 100 MCG: 10 INJECTION INTRAVENOUS at 08:41

## 2023-01-26 RX ADMIN — ONDANSETRON 4 MG: 2 INJECTION INTRAMUSCULAR; INTRAVENOUS at 09:22

## 2023-01-26 RX ADMIN — PHENYLEPHRINE HYDROCHLORIDE 20 MCG/MIN: 10 INJECTION INTRAVENOUS at 08:50

## 2023-01-26 RX ADMIN — OXYCODONE HYDROCHLORIDE 5 MG: 5 TABLET ORAL at 09:59

## 2023-01-26 RX ADMIN — FENTANYL CITRATE 25 MCG: 50 INJECTION INTRAMUSCULAR; INTRAVENOUS at 08:11

## 2023-01-26 RX ADMIN — PHENYLEPHRINE HYDROCHLORIDE 100 MCG: 10 INJECTION INTRAVENOUS at 08:36

## 2023-01-26 RX ADMIN — LIDOCAINE HYDROCHLORIDE 5 ML: 20 INJECTION, SOLUTION EPIDURAL; INFILTRATION; INTRACAUDAL; PERINEURAL at 08:09

## 2023-01-26 RX ADMIN — DIMENHYDRINATE 50 MG: 50 TABLET ORAL at 07:32

## 2023-01-26 ASSESSMENT — PAIN DESCRIPTION - PAIN TYPE
TYPE: SURGICAL PAIN

## 2023-01-26 ASSESSMENT — PAIN DESCRIPTION - FREQUENCY
FREQUENCY: CONTINUOUS

## 2023-01-26 ASSESSMENT — PAIN DESCRIPTION - DESCRIPTORS
DESCRIPTORS: DISCOMFORT

## 2023-01-26 ASSESSMENT — LIFESTYLE VARIABLES: SMOKING_STATUS: 0

## 2023-01-26 ASSESSMENT — PAIN SCALES - GENERAL
PAINLEVEL_OUTOF10: 4
PAINLEVEL_OUTOF10: 4
PAINLEVEL_OUTOF10: 2
PAINLEVEL_OUTOF10: 3

## 2023-01-26 ASSESSMENT — PAIN DESCRIPTION - LOCATION
LOCATION: FOOT

## 2023-01-26 ASSESSMENT — PAIN DESCRIPTION - ORIENTATION
ORIENTATION: RIGHT

## 2023-01-26 NOTE — DISCHARGE INSTRUCTIONS
SAME DAY SURGERY DISCHARGE INSTRUCTIONS    1. Do not drive or operate hazardous machinery for 24 hours. 2.  Do not make important personal or business decisions for 24 hours. 3.  Do not drink alcoholic beverages for 24 hours. 4.  Do not smoke tobacco products for 24 hours. 5.  Eat light foods (Jell-O, soups, etc....) and drink plenty of fluids (water, Sprite, etc...) up to 8 glasses per day, as you can tolerate. 6.  If your bandages become soaked with bright red blood, place another dressing pad over your bandages. (DO NOT remove original bandage.)  Call your surgeon for further instructions. A small amount of bright red blood is to be expected. 7.  Limit your activities for 24 hours. Do not engage in heavy work until your surgeon gives you permission. 8.  Patient should not be left alone for 12-24 hours following surgical procedure. 9.  Report the following signs or any questions regarding your physical condition to your surgeon immediately:    Excessive swelling of, or around the wound area. Redness. Temperature of 100 degrees (F) or above. Excessive pain. 10. Call your surgeon for any questions regarding your surgery. POST-OPERATIVE INSTRUCTIONS    Elevate extremity at the level of your heart or above. Ice 30 minutes on, 30 minutes off for the first 24 hours. Do not remove bandages and dressing. Do not get bandages wet. Take pain medications as directed. Resume your regular diet. Full weight-bearing as tolerated on operative foot. Next pain pill due at 2:00 PM if needed. Any question or concerns please call the office ([phone]). If after hours Dr. Juan Pablo Gale can be reached at [phone] (home) or [phone] (cell phone).

## 2023-01-26 NOTE — ANESTHESIA PRE PROCEDURE
Department of Anesthesiology  Preprocedure Note       Name:  Christophe Jerome   Age:  60 y.o.  :  1960                                          MRN:  [id number]         Date:  2023      Surgeon: Sue Acharya):  Dilma Ponce DPM    Procedure: Procedure(s):  TOE TOTAL ARTHROPLASTY- HALLUX    Medications prior to admission:   Prior to Admission medications    Medication Sig Start Date End Date Taking? Authorizing Provider   GARLIC PO Take 1 tablet by mouth nightly    Historical Provider, MD   metoprolol succinate (TOPROL XL) 50 MG extended release tablet Take 0.5 tablets by mouth in the morning and 0.5 tablets in the evening.  10/18/22   Americo Funes MD   Multiple Minerals-Vitamins (DAVID-MAG-ZINC-D PO) Take by mouth daily Calcium, Magnesium, and Zinc combo daily    Historical Provider, MD   carbidopa-levodopa (SINEMET)  MG per tablet Take 1 tablet by mouth 2 times daily 22  Cole Casas MD   diclofenac sodium (VOLTAREN) 1 % GEL Apply topically 4 times daily as needed for Pain    Historical Provider, MD   ezetimibe (ZETIA) 10 MG tablet Take 1 tablet by mouth daily  Patient not taking: Reported on 2023   Jayashree Eugene PA-C   allopurinol (ZYLOPRIM) 300 MG tablet Take 1 tablet by mouth 2 times daily 22   Sunita Mayo MD   fenofibrate (TRIGLIDE) 160 MG tablet Take 1 tablet by mouth daily 22   Sunita Mayo MD   ASPIRIN 81 PO Take by mouth daily     Historical Provider, MD   VITAMIN D PO Take 5,000 Units by mouth daily Every other week patient takes 2 tabs daily    Historical Provider, MD   Multiple Vitamins-Minerals (MENS 50+ MULTI VITAMIN/MIN PO) Take by mouth daily     Historical Provider, MD   methocarbamol (ROBAXIN) 500 MG tablet Take 1 tablet by mouth nightly 22   Sunita Mayo MD   DULoxetine (CYMBALTA) 60 MG extended release capsule Take 60 mg by mouth daily    Historical Provider, MD   rosuvastatin (CRESTOR) 20 MG tablet Take 20 mg by mouth daily    Historical Provider, MD   omeprazole (PRILOSEC) 20 MG delayed release capsule Take 20 mg by mouth daily    Historical Provider, MD   furosemide (LASIX) 40 MG tablet Take 40 mg by mouth 2 times daily    Historical Provider, MD   lisinopril (PRINIVIL;ZESTRIL) 20 MG tablet Take 20 mg by mouth daily    Historical Provider, MD   pramipexole (MIRAPEX) 1.5 MG tablet Take 1.5 mg by mouth nightly    Historical Provider, MD       Current medications:    No current facility-administered medications for this visit. No current outpatient medications on file. Facility-Administered Medications Ordered in Other Visits   Medication Dose Route Frequency Provider Last Rate Last Admin    clindamycin (CLEOCIN) 900 mg in dextrose 5 % 50 mL IVPB  900 mg IntraVENous Once Nargis Hodges DPM        lactated ringers IV soln infusion   IntraVENous Continuous Saintclair Cristina, APRN - CRNA 100 mL/hr at 01/26/23 0731 New Bag at 01/26/23 0731       Allergies: Allergies   Allergen Reactions    Penicillins Rash    Statins Rash       Problem List:    Patient Active Problem List   Diagnosis Code    Appendicitis K37    Acute appendicitis K35.80    Carpal tunnel syndrome G56.00    Cervical radiculopathy M54.12    Cervicalgia M54.2    Displacement of cervical intervertebral disc without myelopathy M50.20    Primary osteoarthritis of right knee M17.11    Right foot pain M79.671    Leg swelling M79.89    Primary hypertension I10    Chronic gout without tophus M1A. 9XX0    Restless leg syndrome G25.81    Muscle spasm M62.838    Chronic right shoulder pain M25.511, G89.29    Chronic left shoulder pain M25.512, G89.29    Chronic neck pain M54.2, G89.29    Chronic bilateral low back pain with bilateral sciatica M54.42, M54.41, G89.29    Coronary artery disease without angina pectoris I25.10    Gastroesophageal reflux disease K21.9    Diabetes mellitus (HCC) E11.9    Vitamin B12 deficiency E53.8    Hypovitaminosis D E55.9    Hyperlipidemia E78.5    Personal history of tobacco use Z87.891    Itching L29.9       Past Medical History:        Diagnosis Date    Arthritis     CAD (coronary artery disease)     [facility] [address] [address] [address]     Tonsil    Carpal tunnel syndrome 2/9/2018    Diabetes mellitus ([facility] 4/20/2022    Gastroesophageal reflux disease 4/20/2022    Hyperlipidemia     Hypertension     Movement disorder        Past Surgical History:        Procedure Laterality Date    APPENDECTOMY  08/07/2021    laparoscopic    CARDIAC SURGERY  2011    CABG x 3 in 2011    CARPAL TUNNEL RELEASE Bilateral 2015    FINGER TRIGGER RELEASE Bilateral 2015    JOINT REPLACEMENT  06/25/2021    RIGHT KNEE SURGERY    JOINT REPLACEMENT Bilateral 2015    joint replacement - bilateral hands - knuckles    LAPAROSCOPIC APPENDECTOMY N/A 08/07/2021    APPENDECTOMY LAPAROSCOPIC performed by Xavier Michel MD at [address] [place] [address] History:    Social History     Tobacco Use    Smoking status: Former     Packs/day: 2.00     Years: 40.00     Pack years: 80.00     Types: Cigarettes     Quit date: 2008     Years since quitting: 15.0    Smokeless tobacco: Never   Substance Use Topics    Alcohol use: Yes     Comment: Occasional                                Counseling given: Not Answered      Vital Signs (Current): There were no vitals filed for this visit.                                            BP Readings from Last 3 Encounters:   01/26/23 (!) 157/93   01/18/23 (!) 142/78   09/28/22 (!) 140/88       NPO Status:                                                                                 BMI:   Wt Readings from Last 3 Encounters:   01/26/23 253 lb (114.8 kg)   01/18/23 253 lb 9.6 oz (115 kg)   05/31/22 225 lb 12.8 oz (102.4 kg)     There is no height or weight on file to calculate BMI.    CBC:   Lab Results   Component Value Date/Time    WBC 6.4 01/18/2023 08:50 AM    RBC 4.43 01/18/2023 08:50 AM    HGB 13.3 01/18/2023 08:50 AM    HCT 40.1 01/18/2023 08:50 AM    MCV 90.5 01/18/2023 08:50 AM    RDW 12.6 01/18/2023 08:50 AM     01/18/2023 08:50 AM       CMP:   Lab Results   Component Value Date/Time     01/18/2023 08:50 AM    K 4.6 01/18/2023 08:50 AM     01/18/2023 08:50 AM    CO2 25 01/18/2023 08:50 AM    BUN 28 01/18/2023 08:50 AM    CREATININE 0.88 01/18/2023 08:50 AM    GFRAA >60 04/19/2022 12:03 PM    LABGLOM >60 01/18/2023 08:50 AM    GLUCOSE 176 01/18/2023 08:50 AM    PROT 7.0 04/19/2022 12:03 PM    CALCIUM 10.0 01/18/2023 08:50 AM    BILITOT 0.46 04/19/2022 12:03 PM    ALKPHOS 93 04/19/2022 12:03 PM    AST 22 04/19/2022 12:03 PM    ALT 30 04/19/2022 12:03 PM       POC Tests: No results for input(s): POCGLU, POCNA, POCK, POCCL, POCBUN, POCHEMO, POCHCT in the last 72 hours.     Coags: No results found for: PROTIME, INR, APTT    HCG (If Applicable): No results found for: PREGTESTUR, PREGSERUM, HCG, HCGQUANT     ABGs: No results found for: PHART, PO2ART, FZT8AEB, LIC1BBM, BEART, N9GENHHZ     Type & Screen (If Applicable):  No results found for: LABABO, LABRH    Drug/Infectious Status (If Applicable):  No results found for: HIV, HEPCAB    COVID-19 Screening (If Applicable): No results found for: COVID19        Anesthesia Evaluation  Patient summary reviewed and Nursing notes reviewed no history of anesthetic complications:   Airway: Mallampati: II  TM distance: >3 FB   Neck ROM: limited  Comment: limited ROM laterally ~ 45\"  Mouth opening: > = 3 FB   Dental:          Pulmonary:Negative Pulmonary ROS breath sounds clear to auscultation      (-) not a current smoker (QUIT 2008)                           Cardiovascular:  Exercise tolerance: good (>4 METS),   (+) hypertension:, CAD: no interval change, CABG/stent: no interval change, hyperlipidemia    (-)  angina    ECG reviewed  Rhythm: regular  Rate: normal  Echocardiogram reviewed  Stress test reviewed             ROS comment: 3V CABG 2011, LT cardiologist 6 mo in [facility] Neuro/Psych:                ROS comment: severe restless leg syndrome GI/Hepatic/Renal:   (+) GERD: poorly controlled,           Endo/Other:    (+) DiabetesType II DM, well controlled, , blood dyscrasia: anemia, arthritis: OA., malignancy/cancer. ROS comment: chronic anemia, gout  h/o throat CA 2013 - 8 wks chemo and 8 wks radiation - several surgeries since that time without difficult intubation   Abdominal:   (+) obese,            PE comment: distended   Vascular: negative vascular ROS. Other Findings:             Anesthesia Plan      general and TIVA     ASA 3       Induction: intravenous. MIPS: Postoperative opioids intended and Prophylactic antiemetics administered. Anesthetic plan and risks discussed with patient.                         Marlin Cole, APRN - CRNA   1/26/2023

## 2023-01-26 NOTE — PROGRESS NOTES
Pt verbalized readiness to go home. Discharge instructions given to pt and step-daughter. Verbalized understanding and all questions answered at this time. Discharge Criteria    Inpatients must meet Criteria 1 through 7. All other patients are either YES or N/A. If a NO is chosen then Anesthesia or Surgeon must be notified. 1.  Minimum 30 minutes after last dose of sedative medication, minimum 120 minutes after last dose of reversal agent. Yes      2. Systolic BP stable within 20 mmHg for 30 minutes & systolic BP between 90 & 915 or within 10 mmHg of baseline. Yes      3. Pulse between 60 and 100 or within 10 bpm of baseline. Yes      4. Spontaneous respiratory rate >/= 10 per minute. Yes      5. SaO2 >/= 95 or  >/= baseline. Yes      6. Able to cough and swallow or return to baseline function. Yes      7. Alert and oriented or return to baseline mental status. Yes      8. Demonstrates controlled, coordinated movements, ambulates with steady gait, or return to baseline activity function. Yes      9. Minimal or no pain or nausea, or at a level tolerable and acceptable to patient. Yes      10. Takes and retains oral fluids as allowed. Yes      11. Procedural / perioperative site stable. Minimal or no bleeding. Yes          12. If GI endoscopy procedure, minimal or no abdominal distention or passing flatus. N/A      13. Written discharge instructions and emergency telephone number provided. Yes      14. Accompanied by a responsible adult.     Yes

## 2023-01-26 NOTE — ANESTHESIA POSTPROCEDURE EVALUATION
Department of Anesthesiology  Postprocedure Note    Patient: Rogelio Parsons  MRN: [id number]  YOB: 1960  Date of evaluation: 1/26/2023      Procedure Summary     Date: 01/26/23 Room / Location: [facility] / [facility]    Anesthesia Start: 4159 Anesthesia Stop: 6681    Procedure: TOE TOTAL ARTHROPLASTY- HALLUX (Right: Toes) Diagnosis:       Hallux rigidus of right foot      (RIGHT HALLUX RIGIDUS)    Surgeons: Bren Salvador DPM Responsible Provider: ANNMARIE Calvin CRNA    Anesthesia Type: general, TIVA ASA Status: 3          Anesthesia Type: No value filed.     Sharmin Phase I: Sharmin Score: 9    Sharmin Phase II: Sharmin Score: 10      Anesthesia Post Evaluation    Patient location during evaluation: bedside  Patient participation: complete - patient participated  Level of consciousness: awake and alert  Pain score: 2  Airway patency: patent  Nausea & Vomiting: no nausea and no vomiting  Complications: no  Cardiovascular status: hemodynamically stable  Respiratory status: acceptable  Hydration status: stable

## 2023-01-27 LAB — SURGICAL PATHOLOGY REPORT: NORMAL

## 2023-02-02 NOTE — OP NOTE
[address], [place] [address]                                OPERATIVE REPORT    PATIENT NAME: Kehinde Decker                   :        1960  MED REC NO:   [id number]                              ROOM:  ACCOUNT NO:   [de-identified]                           ADMIT DATE: 2023  PROVIDER:     Esthela Evans DPM    DATE OF PROCEDURE:  2023    SURGEON:  Esthela Evans DPM.    ASSISTANT:  _____. PREOPERATIVE DIAGNOSIS:  Right foot hallux rigidus. POSTOPERATIVE DIAGNOSIS:  Right foot hallux rigidus. PROCEDURE PERFORMED:  Right foot first MPJ total joint arthroplasty with  [facility] Silastic implant. ANESTHESIA:  MAC with local.    ANESTHESIOLOGIST:  James Roach. HEMOSTASIS:  Pneumatic ankle tourniquet at 250 mmHg. OBJECTIVE:  As follows: The patient was prophylaxed with 900 mg of  clindamycin IV piggyback 1 hour preop. The patient was taken to the  operating room and placed in dorsal recumbent position. Attention  directed to the right foot first metatarsophalangeal joint, where  approximately 8 mL of 50:50 mixture of 2% lidocaine plain and 0.5%  Marcaine with epinephrine was infiltrated in Masterson block. Foot was then  prepped and draped sterilely. Attention now directed to the dorsal aspect of the right foot first MPJ,  where a 7-cm curvilinear incision was made. Incision was deepened  through the subcutaneous tissues. Care was taken to cut, clamp, and  Bovie all incisional blood vessels. Dissection carried down to bone and  medial and lateral collateral ligaments were all freed up. Noted  extensive arthritic changes in the first metatarsophalangeal joint,  actually end-stage. Total joint destruction was noted. There were  floating osteophytic pieces of bone, numerous. All removed. Sagittal  saw was then used to resect the head of the first metatarsal and base of  the middle phalanx.   Two canals were fashioned to each area. A #5 sizer  was placed and found to be an excellent fit. The area copiously lavaged  with gentamicin flush. After some remodelling of the surrounding bone  and removal of continued arthritic areas, a #5 short Silastic implant  was then placed. Joint placed through a range of motion and found to be  excellent. Again, the area was copiously lavaged with gentamicin flush. Capsule was then closed with 2-0 Vicryl suture. The subcutaneous closed  with 3-0 Vicryl suture and the skin closed with 4-0 nylon in a  horizontal mattress fashion. A dry sterile fluff compressive dressing  was then applied. The patient tolerated the anesthesia and procedure  well without complications and was transported to the recovery room with  vital signs stable, afebrile, and in apparent satisfactory condition. Sponge, needle, and instrument counts were all correct. Estimated blood  loss was less than 50 mL.         Tracey Márquez DPM    D: 02/01/2023 14:37:25       T: 02/01/2023 14:41:43     [email or web address]  Job#: [id number]     Doc#: [id number]    CC:

## 2023-02-20 ENCOUNTER — OFFICE VISIT (OUTPATIENT)
Dept: CARDIOLOGY | Age: 63
End: 2023-02-20
Payer: COMMERCIAL

## 2023-02-20 ENCOUNTER — OFFICE VISIT (OUTPATIENT)
Dept: PRIMARY CARE CLINIC | Age: 63
End: 2023-02-20

## 2023-02-20 VITALS
HEIGHT: 69 IN | SYSTOLIC BLOOD PRESSURE: 143 MMHG | WEIGHT: 260.2 LBS | DIASTOLIC BLOOD PRESSURE: 85 MMHG | OXYGEN SATURATION: 98 % | BODY MASS INDEX: 38.54 KG/M2 | RESPIRATION RATE: 18 BRPM | HEART RATE: 74 BPM

## 2023-02-20 VITALS
DIASTOLIC BLOOD PRESSURE: 84 MMHG | SYSTOLIC BLOOD PRESSURE: 162 MMHG | WEIGHT: 262 LBS | HEART RATE: 76 BPM | OXYGEN SATURATION: 95 % | HEIGHT: 69 IN | BODY MASS INDEX: 38.8 KG/M2

## 2023-02-20 DIAGNOSIS — I10 PRIMARY HYPERTENSION: ICD-10-CM

## 2023-02-20 DIAGNOSIS — I10 PRIMARY HYPERTENSION: Primary | ICD-10-CM

## 2023-02-20 DIAGNOSIS — K21.9 GASTROESOPHAGEAL REFLUX DISEASE, UNSPECIFIED WHETHER ESOPHAGITIS PRESENT: ICD-10-CM

## 2023-02-20 DIAGNOSIS — R94.39 ABNORMAL STRESS TEST: ICD-10-CM

## 2023-02-20 DIAGNOSIS — E78.5 HYPERLIPIDEMIA, UNSPECIFIED HYPERLIPIDEMIA TYPE: ICD-10-CM

## 2023-02-20 DIAGNOSIS — Z87.898 HISTORY OF SYNCOPE: ICD-10-CM

## 2023-02-20 DIAGNOSIS — R42 DIZZY: ICD-10-CM

## 2023-02-20 DIAGNOSIS — Z95.1 S/P CABG X 3: ICD-10-CM

## 2023-02-20 DIAGNOSIS — I25.10 ASHD (ARTERIOSCLEROTIC HEART DISEASE): ICD-10-CM

## 2023-02-20 DIAGNOSIS — E11.69 TYPE 2 DIABETES MELLITUS WITH OTHER SPECIFIED COMPLICATION, WITHOUT LONG-TERM CURRENT USE OF INSULIN (HCC): ICD-10-CM

## 2023-02-20 DIAGNOSIS — E78.2 MIXED HYPERLIPIDEMIA: ICD-10-CM

## 2023-02-20 DIAGNOSIS — R42 LIGHTHEADED: ICD-10-CM

## 2023-02-20 LAB — HBA1C MFR BLD: 8.3 %

## 2023-02-20 PROCEDURE — 3017F COLORECTAL CA SCREEN DOC REV: CPT | Performed by: PHYSICIAN ASSISTANT

## 2023-02-20 PROCEDURE — 99214 OFFICE O/P EST MOD 30 MIN: CPT | Performed by: PHYSICIAN ASSISTANT

## 2023-02-20 PROCEDURE — G8417 CALC BMI ABV UP PARAM F/U: HCPCS | Performed by: PHYSICIAN ASSISTANT

## 2023-02-20 PROCEDURE — 3077F SYST BP >= 140 MM HG: CPT | Performed by: PHYSICIAN ASSISTANT

## 2023-02-20 PROCEDURE — G8484 FLU IMMUNIZE NO ADMIN: HCPCS | Performed by: PHYSICIAN ASSISTANT

## 2023-02-20 PROCEDURE — 3078F DIAST BP <80 MM HG: CPT | Performed by: PHYSICIAN ASSISTANT

## 2023-02-20 PROCEDURE — 1036F TOBACCO NON-USER: CPT | Performed by: PHYSICIAN ASSISTANT

## 2023-02-20 PROCEDURE — G8427 DOCREV CUR MEDS BY ELIG CLIN: HCPCS | Performed by: PHYSICIAN ASSISTANT

## 2023-02-20 RX ORDER — FUROSEMIDE 40 MG/1
40 TABLET ORAL DAILY
Qty: 90 TABLET | Refills: 1 | Status: SHIPPED | OUTPATIENT
Start: 2023-02-20

## 2023-02-20 RX ORDER — MELOXICAM 15 MG/1
TABLET ORAL
COMMUNITY
Start: 2023-01-11

## 2023-02-20 RX ORDER — AMOXICILLIN AND CLAVULANATE POTASSIUM 500; 125 MG/1; MG/1
TABLET, FILM COATED ORAL
COMMUNITY
Start: 2023-02-15

## 2023-02-20 RX ORDER — OXYCODONE HYDROCHLORIDE AND ACETAMINOPHEN 5; 325 MG/1; MG/1
TABLET ORAL
COMMUNITY
Start: 2023-01-26 | End: 2023-02-20

## 2023-02-20 ASSESSMENT — PATIENT HEALTH QUESTIONNAIRE - PHQ9
DEPRESSION UNABLE TO ASSESS: PT REFUSES
SUM OF ALL RESPONSES TO PHQ9 QUESTIONS 1 & 2: 0
SUM OF ALL RESPONSES TO PHQ QUESTIONS 1-9: 0
2. FEELING DOWN, DEPRESSED OR HOPELESS: 0
SUM OF ALL RESPONSES TO PHQ QUESTIONS 1-9: 0
SUM OF ALL RESPONSES TO PHQ QUESTIONS 1-9: 0
1. LITTLE INTEREST OR PLEASURE IN DOING THINGS: 0
SUM OF ALL RESPONSES TO PHQ QUESTIONS 1-9: 0

## 2023-02-20 NOTE — PATIENT INSTRUCTIONS
SURVEY:    You may be receiving a survey from [employer] regarding your visit today. Please complete the survey to enable us to provide the highest quality of care to you and your family. If you cannot score us a very good on any question, please call the office to discuss how we could have made your experience a very good one. Thank you.
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
## 2023-02-20 NOTE — PROGRESS NOTES
[address], [address] , [facility], [address]    Ching Painting is a 60 y.o. male with  has a past medical history of Arthritis, CAD (coronary artery disease), Cancer ([facility] [address] Carpal tunnel syndrome, Diabetes mellitus ([facility] [address] Gastroesophageal reflux disease, Hyperlipidemia, Hypertension, and Movement disorder. Presented to the office today for:  Chief Complaint   Patient presents with    6 Month Follow-Up    Hypertension    Diabetes    Hyperlipidemia       Assessment/Plan   1. Primary hypertension  -     CBC with Auto Differential; Future  -     Comprehensive Metabolic Panel; Future  2. Hyperlipidemia, unspecified hyperlipidemia type  -     Lipid Panel; Future  3. Type 2 diabetes mellitus with other specified complication, without long-term current use of insulin (HCC)  -     CBC with Auto Differential; Future  -     Comprehensive Metabolic Panel; Future  4. Gastroesophageal reflux disease, unspecified whether esophagitis present  -     [facility]    Return in about 3 months (around 5/20/2023) for F/U T2DM, HTN. Hyperlipidemia - stable on Fenofibrate and Crestor, repeat lipid panel  HTN - not at goal today ,he has been out of Lasix, cont. W/ Toprol and Lisinopril (may consider adjusting), will await Cardiology recommendations. T2DM- Monitor A1c /repeat in 3 months' time, off medications at this time, encouraged diet/exercise. He does not want to be started on any medications at this time. He does not want to meet up with a diabetic educator at this time. GI referral for GERD/Colonoscopy     All patient questions answered. Pt voiced understanding.    Medications Discontinued During This Encounter   Medication Reason    diclofenac sodium (VOLTAREN) 1 % GEL ERROR    ezetimibe (ZETIA) 10 MG tablet ERROR    Multiple Vitamins-Minerals (MENS 50+ MULTI VITAMIN/MIN PO) ERROR    methocarbamol (ROBAXIN) 500 MG tablet LIST CLEANUP    oxyCODONE-acetaminophen (PERCOCET) 5-325 MG per tablet LIST CLEANUP       Patient received counseling on the following healthy behaviors: nutrition, exercise and medication adherence. I encouraged and discussed lifestyle modifications including diet and exercise and the patient was agreeable to making positive/beneficial changes to both to help improve their overall health. Discussed use, benefit, and side effects of prescribed medications. Barriers to medication compliance addressed. Patient given educational materials: see patient instructions. HM - HM items completed today as per orders. Outstanding HM items though not limited to immunizations were discussed with the patient today, including risks, benefits and alternatives. The patient will discuss these during the next appointment per their preference. If there are any worsening or concerning signs or symptoms, patient will report to the ED and/or contact [id number] for immediate evaluation. Teach back method was used. Subjective:    HPI  Chief Complaint   Patient presents with    6 Month Follow-Up    Hypertension    Diabetes    Hyperlipidemia     [place] from [place]/finished school. Hyperlipidemia:  There is a family history of hyperlipidemia. The patient had a repeat lipid panel. He is currently on Crestor 20 mg and also fenofibrate 160 mg and he is tolerating these well at this time. No new myalgias or GI upset. Lab Results   Component Value Date    CHOL 158 05/02/2022    TRIG 162 (H) 05/02/2022    HDL 37 (L) 05/02/2022    LDLDIRECT 72 04/02/2021     Lab Results   Component Value Date    ALT 30 04/19/2022    AST 22 04/19/2022        Hypertension: Patient here for follow-up of elevated blood pressure. He is not exercising and is not adherent to low salt diet. Blood pressure is well controlled at home. Cardiac symptoms none.  Patient denies chest pain, chest pressure/discomfort, claudication, dyspnea, exertional chest pressure/discomfort, fatigue, irregular heart beat, lower extremity edema, near-syncope, orthopnea, palpitations, paroxysmal nocturnal dyspnea, syncope, and tachypnea. The patient is on Toprol 25mg BID, Lisinopril 20mg, Lasix 40mg BID. Follow-up of Type 2 diabetes mellitus. Meds - none  Compliance - good   Home blood sugar records: typically running in the low 110s  He has been making some changes with diet/exercise over the past 2 weeks. Any episodes of hypoglycemia? no  Fluctuating blood sugars?no  Weight trend: stable  Current diet: in general, a \"healthy\" diet  , well balanced  Current exercise: walking and yard work  Known diabetic complications: none    Hgb A1c -   Lab Results   Component Value Date    LABA1C 6.5 (H) 04/19/2022     BP -   BP Readings from Last 1 Encounters:   02/20/23 (!) 162/84     Lipid Panel -   Lab Results   Component Value Date/Time    HDL 37 05/02/2022 10:22 AM    LDLDIRECT 72 04/02/2021 06:56 AM    TRIG 162 05/02/2022 10:22 AM     Health Maintenance -   Alcohol/Substance use History - None    Tobacco Use      Smoking status: Former        Packs/day: 2.00        Years: 40.00        Pack years: [de-identified]        Types: Cigarettes        Quit date: 2008        Years since quitting: 15.1      Smokeless tobacco: Never    Family History   Problem Relation Age of Onset    Cancer Mother     Heart Disease Father 76        pacemaker       PHQ Scores 2/20/2023 4/19/2022   PHQ2 Score 0 2   PHQ9 Score 0 2     Interpretation of Total Score Depression Severity: 1-4 = Minimal depression, 5-9 = Mild depression, 10-14 = Moderate depression, 15-19 = Moderately severe depression, 20-27 = Severe depression    Review of Systems  Constitutional: Negative for activity change, appetite change, chills, diaphoresis, fatigue, fever and unexpected weight change. HENT: Negative for sinus pressure, sinus pain, sore throat and trouble swallowing. Respiratory: Negative for cough, shortness of breath and wheezing.     Cardiovascular: Negative for chest pain, palpitations and leg swelling. Gastrointestinal: Negative for abdominal pain, diarrhea, nausea and vomiting. Endocrine: Negative for cold intolerance, polydipsia, polyphagia and polyuria. Genitourinary: Negative for difficulty urinating, flank pain and frequency. Musculoskeletal: Negative for gait problem and joint swelling. Negative for back pain, neck pain and neck stiffness. Skin: Negative for color change and wound. Negative for pallor and rash. Allergic/Immunologic: Negative for environmental allergies and food allergies. Neurological: Negative for light-headedness, numbness and headaches. Psychiatric/Behavioral: Negative for sleep disturbance. Negative for confusion and suicidal ideas. Objective:    BP (!) 162/84   Pulse 76   Ht 5' 9\" (1.753 m)   Wt 262 lb (118.8 kg)   SpO2 95%   BMI 38.69 kg/m²    BP Readings from Last 3 Encounters:   02/20/23 (!) 162/84   01/26/23 (!) 128/55   01/18/23 (!) 142/78     Physical Exam  Constitutional: Patient is oriented to person, place, and time. Patient appears well-developed and well-nourished. No distress. HENT: Head: Normocephalic and atraumatic. Eyes: Pupils are equal, round, and reactive to light. Conjunctivae are normal. Right eye exhibits no discharge. Left eye exhibits no discharge. Cardiovascular: Normal rate, regular rhythm and normal heart sounds. Pulmonary/Chest: Effort normal and breath sounds normal. No respiratory distress. Patient has no wheezes. Abdominal: Soft. Bowel sounds are normal. Patient exhibits no distension. There is no tenderness. Musculoskeletal:  Patient exhibits no edema and tenderness. Patient exhibits no deformity. Neurological: Patient is alert and oriented to person, place, and time. Skin: Skin is warm and dry. Patient is not diaphoretic. Psychiatric: Patient's speech is normal and behavior is normal. Thought content normal.   Vitals reviewed.       Lab Results   Component Value Date    WBC 6.4 01/18/2023    HGB 13.3 01/18/2023    HCT 40.1 (L) 01/18/2023     01/18/2023    CHOL 158 05/02/2022    TRIG 162 (H) 05/02/2022    HDL 37 (L) 05/02/2022    LDLDIRECT 72 04/02/2021    ALT 30 04/19/2022    AST 22 04/19/2022     01/18/2023    K 4.6 01/18/2023     01/18/2023    CREATININE 0.88 01/18/2023    BUN 28 (H) 01/18/2023    CO2 25 01/18/2023    TSH 3.01 04/19/2022    LABA1C 6.5 (H) 04/19/2022    LABMICR Can not be calculated 05/03/2022     Lab Results   Component Value Date    CALCIUM 10.0 01/18/2023     Lab Results   Component Value Date    LDLCHOLESTEROL 89 05/02/2022    LDLDIRECT 72 04/02/2021       Please note that this chart was generated using voice recognition Dragon dictation software. Although every effort was made to ensure the accuracy of this automated transcription, some errors in transcription may have occurred.     Electronically signed by Dr. Julio Beltran MD on 2/20/2023 at 2:40 PM

## 2023-02-25 ENCOUNTER — HOSPITAL ENCOUNTER (OUTPATIENT)
Age: 63
Discharge: HOME | End: 2023-02-25
Payer: COMMERCIAL

## 2023-02-25 DIAGNOSIS — E78.2 MIXED HYPERLIPIDEMIA: ICD-10-CM

## 2023-02-25 DIAGNOSIS — E11.69 TYPE 2 DIABETES MELLITUS WITH OTHER SPECIFIED COMPLICATION, WITHOUT LONG-TERM CURRENT USE OF INSULIN (HCC): ICD-10-CM

## 2023-02-25 DIAGNOSIS — E78.5 HYPERLIPIDEMIA, UNSPECIFIED HYPERLIPIDEMIA TYPE: ICD-10-CM

## 2023-02-25 DIAGNOSIS — R94.39 ABNORMAL STRESS TEST: ICD-10-CM

## 2023-02-25 DIAGNOSIS — I25.10 ASHD (ARTERIOSCLEROTIC HEART DISEASE): ICD-10-CM

## 2023-02-25 DIAGNOSIS — Z87.898 HISTORY OF SYNCOPE: ICD-10-CM

## 2023-02-25 DIAGNOSIS — Z95.1 S/P CABG X 3: ICD-10-CM

## 2023-02-25 DIAGNOSIS — I10 PRIMARY HYPERTENSION: ICD-10-CM

## 2023-02-25 LAB
ABSOLUTE EOS #: 0.23 K/UL (ref 0–0.44)
ABSOLUTE IMMATURE GRANULOCYTE: <0.03 K/UL (ref 0–0.3)
ABSOLUTE LYMPH #: 0.67 K/UL (ref 1.1–3.7)
ABSOLUTE MONO #: 0.4 K/UL (ref 0.1–1.2)
ALBUMIN SERPL-MCNC: 4.2 G/DL (ref 3.5–5.2)
ALBUMIN/GLOBULIN RATIO: 1.5 (ref 1–2.5)
ALP SERPL-CCNC: 84 U/L (ref 40–129)
ALT SERPL-CCNC: 38 U/L (ref 5–41)
ANION GAP SERPL CALCULATED.3IONS-SCNC: 10 MMOL/L (ref 9–17)
AST SERPL-CCNC: 25 U/L
BASOPHILS # BLD: 1 % (ref 0–2)
BASOPHILS ABSOLUTE: 0.03 K/UL (ref 0–0.2)
BILIRUB SERPL-MCNC: 0.4 MG/DL (ref 0.3–1.2)
BUN SERPL-MCNC: 36 MG/DL (ref 8–23)
BUN/CREAT BLD: 38 (ref 9–20)
CALCIUM SERPL-MCNC: 9.2 MG/DL (ref 8.6–10.4)
CHLORIDE SERPL-SCNC: 101 MMOL/L (ref 98–107)
CO2 SERPL-SCNC: 27 MMOL/L (ref 20–31)
CREAT SERPL-MCNC: 0.96 MG/DL (ref 0.7–1.2)
EOSINOPHILS RELATIVE PERCENT: 4 % (ref 1–4)
GFR SERPL CREATININE-BSD FRML MDRD: >60 ML/MIN/1.73M2
GLUCOSE SERPL-MCNC: 161 MG/DL (ref 70–99)
HCT VFR BLD AUTO: 39.8 % (ref 40.7–50.3)
HGB BLD-MCNC: 13 G/DL (ref 13–17)
IMMATURE GRANULOCYTES: 0 %
LYMPHOCYTES # BLD: 13 % (ref 24–43)
MCH RBC QN AUTO: 29.6 PG (ref 25.2–33.5)
MCHC RBC AUTO-ENTMCNC: 32.7 G/DL (ref 28.4–34.8)
MCV RBC AUTO: 90.7 FL (ref 82.6–102.9)
MONOCYTES # BLD: 8 % (ref 3–12)
NRBC AUTOMATED: 0 PER 100 WBC
PDW BLD-RTO: 12.9 % (ref 11.8–14.4)
PLATELET # BLD AUTO: 204 K/UL (ref 138–453)
PMV BLD AUTO: 9.4 FL (ref 8.1–13.5)
POTASSIUM SERPL-SCNC: 4.6 MMOL/L (ref 3.7–5.3)
PROT SERPL-MCNC: 7 G/DL (ref 6.4–8.3)
RBC # BLD: 4.39 M/UL (ref 4.21–5.77)
SEG NEUTROPHILS: 74 % (ref 36–65)
SEGMENTED NEUTROPHILS ABSOLUTE COUNT: 3.93 K/UL (ref 1.5–8.1)
SODIUM SERPL-SCNC: 138 MMOL/L (ref 135–144)
WBC # BLD AUTO: 5.3 K/UL (ref 3.5–11.3)

## 2023-02-25 PROCEDURE — 83721 ASSAY OF BLOOD LIPOPROTEIN: CPT

## 2023-02-25 PROCEDURE — 80061 LIPID PANEL: CPT

## 2023-02-25 PROCEDURE — 80053 COMPREHEN METABOLIC PANEL: CPT

## 2023-02-25 PROCEDURE — 85025 COMPLETE CBC W/AUTO DIFF WBC: CPT

## 2023-02-25 PROCEDURE — 36415 COLL VENOUS BLD VENIPUNCTURE: CPT

## 2023-02-26 LAB
CHOLEST SERPL-MCNC: 214 MG/DL
CHOLESTEROL/HDL RATIO: 6.5
HDLC SERPL-MCNC: 33 MG/DL
LDLC SERPL CALC-MCNC: ABNORMAL MG/DL (ref 0–130)
LDLC SERPL DIRECT ASSAY-MCNC: 90 MG/DL
TRIGL SERPL-MCNC: 555 MG/DL

## 2023-03-01 ENCOUNTER — OFFICE VISIT (OUTPATIENT)
Dept: PRIMARY CARE CLINIC | Age: 63
End: 2023-03-01
Payer: COMMERCIAL

## 2023-03-01 VITALS
SYSTOLIC BLOOD PRESSURE: 146 MMHG | OXYGEN SATURATION: 96 % | HEIGHT: 69 IN | DIASTOLIC BLOOD PRESSURE: 88 MMHG | HEART RATE: 103 BPM | BODY MASS INDEX: 38.66 KG/M2 | WEIGHT: 261 LBS

## 2023-03-01 DIAGNOSIS — R22.1 NECK MASS: ICD-10-CM

## 2023-03-01 DIAGNOSIS — M54.2 NECK PAIN: Primary | ICD-10-CM

## 2023-03-01 DIAGNOSIS — J02.9 PHARYNGITIS, UNSPECIFIED ETIOLOGY: ICD-10-CM

## 2023-03-01 PROCEDURE — G8417 CALC BMI ABV UP PARAM F/U: HCPCS | Performed by: STUDENT IN AN ORGANIZED HEALTH CARE EDUCATION/TRAINING PROGRAM

## 2023-03-01 PROCEDURE — G8484 FLU IMMUNIZE NO ADMIN: HCPCS | Performed by: STUDENT IN AN ORGANIZED HEALTH CARE EDUCATION/TRAINING PROGRAM

## 2023-03-01 PROCEDURE — 3079F DIAST BP 80-89 MM HG: CPT | Performed by: STUDENT IN AN ORGANIZED HEALTH CARE EDUCATION/TRAINING PROGRAM

## 2023-03-01 PROCEDURE — 3077F SYST BP >= 140 MM HG: CPT | Performed by: STUDENT IN AN ORGANIZED HEALTH CARE EDUCATION/TRAINING PROGRAM

## 2023-03-01 PROCEDURE — 3017F COLORECTAL CA SCREEN DOC REV: CPT | Performed by: STUDENT IN AN ORGANIZED HEALTH CARE EDUCATION/TRAINING PROGRAM

## 2023-03-01 PROCEDURE — 1036F TOBACCO NON-USER: CPT | Performed by: STUDENT IN AN ORGANIZED HEALTH CARE EDUCATION/TRAINING PROGRAM

## 2023-03-01 PROCEDURE — G8427 DOCREV CUR MEDS BY ELIG CLIN: HCPCS | Performed by: STUDENT IN AN ORGANIZED HEALTH CARE EDUCATION/TRAINING PROGRAM

## 2023-03-01 PROCEDURE — 99214 OFFICE O/P EST MOD 30 MIN: CPT | Performed by: STUDENT IN AN ORGANIZED HEALTH CARE EDUCATION/TRAINING PROGRAM

## 2023-03-01 PROCEDURE — 96372 THER/PROPH/DIAG INJ SC/IM: CPT | Performed by: STUDENT IN AN ORGANIZED HEALTH CARE EDUCATION/TRAINING PROGRAM

## 2023-03-01 RX ORDER — METHYLPREDNISOLONE 4 MG/1
TABLET ORAL
Qty: 1 KIT | Refills: 0 | Status: SHIPPED | OUTPATIENT
Start: 2023-03-01 | End: 2023-03-07

## 2023-03-01 RX ORDER — KETOROLAC TROMETHAMINE 10 MG/1
10 TABLET, FILM COATED ORAL EVERY 6 HOURS PRN
Qty: 20 TABLET | Refills: 0 | Status: SHIPPED | OUTPATIENT
Start: 2023-03-01 | End: 2024-02-29

## 2023-03-01 RX ORDER — KETOROLAC TROMETHAMINE 30 MG/ML
60 INJECTION, SOLUTION INTRAMUSCULAR; INTRAVENOUS ONCE
Status: COMPLETED | OUTPATIENT
Start: 2023-03-01 | End: 2023-03-01

## 2023-03-01 RX ORDER — DOXYCYCLINE HYCLATE 100 MG
100 TABLET ORAL 2 TIMES DAILY
Qty: 14 TABLET | Refills: 0 | Status: SHIPPED | OUTPATIENT
Start: 2023-03-01 | End: 2023-03-08

## 2023-03-01 RX ADMIN — KETOROLAC TROMETHAMINE 60 MG: 30 INJECTION, SOLUTION INTRAMUSCULAR; INTRAVENOUS at 11:50

## 2023-03-01 SDOH — ECONOMIC STABILITY: FOOD INSECURITY: WITHIN THE PAST 12 MONTHS, YOU WORRIED THAT YOUR FOOD WOULD RUN OUT BEFORE YOU GOT MONEY TO BUY MORE.: NEVER TRUE

## 2023-03-01 SDOH — ECONOMIC STABILITY: HOUSING INSECURITY
IN THE LAST 12 MONTHS, WAS THERE A TIME WHEN YOU DID NOT HAVE A STEADY PLACE TO SLEEP OR SLEPT IN A SHELTER (INCLUDING NOW)?: NO

## 2023-03-01 SDOH — ECONOMIC STABILITY: FOOD INSECURITY: WITHIN THE PAST 12 MONTHS, THE FOOD YOU BOUGHT JUST DIDN'T LAST AND YOU DIDN'T HAVE MONEY TO GET MORE.: NEVER TRUE

## 2023-03-01 SDOH — ECONOMIC STABILITY: INCOME INSECURITY: HOW HARD IS IT FOR YOU TO PAY FOR THE VERY BASICS LIKE FOOD, HOUSING, MEDICAL CARE, AND HEATING?: NOT HARD AT ALL

## 2023-03-01 NOTE — PROGRESS NOTES
[address], [address] , [facility], [address]    Loni Winchester is a 60 y.o. male with  has a past medical history of Arthritis, CAD (coronary artery disease), Cancer ([facility] [address] Carpal tunnel syndrome, Diabetes mellitus ([facility] [address] Gastroesophageal reflux disease, Hyperlipidemia, Hypertension, and Movement disorder. Presented to the office today for:  Chief Complaint   Patient presents with    Neck Pain     Troubles swallowing        Assessment/Plan   1. Neck pain  -     ketorolac (TORADOL) 10 MG tablet; Take 1 tablet by mouth every 6 hours as needed for Pain, Disp-20 tablet, R-0Normal  -     methylPREDNISolone (MEDROL DOSEPACK) 4 MG tablet; Take by mouth., Disp-1 kit, R-0Normal  -     CT SOFT TISSUE NECK WO CONTRAST; Future  -     doxycycline hyclate (VIBRA-TABS) 100 MG tablet; Take 1 tablet by mouth 2 times daily for 7 days, Disp-14 tablet, R-0Normal  2. Neck mass  -     CT SOFT TISSUE NECK WO CONTRAST; Future  3. Pharyngitis, unspecified etiology  -     doxycycline hyclate (VIBRA-TABS) 100 MG tablet; Take 1 tablet by mouth 2 times daily for 7 days, Disp-14 tablet, R-0Normal    Return if symptoms worsen or fail to improve, for F/U as per prior plans, check-in on Friday. Given prior Hx of Ca/ plan for CT Neck, soft tissue, start on Abx, Prednisone and short-term Toradol use. Symptomatic therapy suggested: push fluids, rest, gargle warm salt water, use vaporizer or mist prn and apply heat to sinuses prn. Nasal saline sprays PRN. Use Neti Pot PRN. Tylenol PRN for pain/fevers, Albuterol PRN for any shortness of breath/wheezing/mild dyspnea. Cepacol PRN for sore throat. May consider additional w/u and management if needed, including CXR/advanced chest imaging, labs. If there are any worsening or concerning signs or symptoms, patient will report to the ED and/or contact EMS-[id number] for immediate evaluation. Teach back method was used. All patient questions answered.  Pt voiced understanding. All patient questions answered. Pt voiced understanding. Medications Discontinued During This Encounter   Medication Reason    carbidopa-levodopa (SINEMET)  MG per tablet ERROR       Patient received counseling on the following healthy behaviors: nutrition, exercise and medication adherence. I encouraged and discussed lifestyle modifications including diet and exercise and the patient was agreeable to making positive/beneficial changes to both to help improve their overall health. Discussed use, benefit, and side effects of prescribed medications. Barriers to medication compliance addressed. Patient given educational materials: see patient instructions. HM - HM items completed today as per orders. Outstanding HM items though not limited to immunizations were discussed with the patient today, including risks, benefits and alternatives. The patient will discuss these during the next appointment per their preference. If there are any worsening or concerning signs or symptoms, patient will report to the ED and/or contact EMS-[id number] for immediate evaluation. Teach back method was used. Subjective:    HPI  Chief Complaint   Patient presents with    Neck Pain     Troubles swallowing      Neck Pain (Troubles swallowing ) for 1 days  Nature of Onset and Mechanism -  sudden  Location - neck / throat   Characteristics/Radiation/Quality - pain in neck woke up at 3 am pain on right side of neck pain to swallow denies SOB tightening of the throat. He has ear pain. No discharge noted. The patient indicates that had throat Ca, s/p Chemo and Radiation 10+ years ago. He had been at the dentist, plans to remove the top tooth.      Health Maintenance -   Alcohol/Substance use History - None/Minimal    Tobacco Use      Smoking status: Former        Packs/day: 2.00        Years: 40.00        Pack years: [de-identified]        Types: Cigarettes        Quit date: 2008        Years since quitting: 15.1      Smokeless Statement Selected tobacco: Never    Family History   Problem Relation Age of Onset    Cancer Mother     Heart Disease Father 76        pacemaker       PHQ Scores 2/20/2023 4/19/2022   PHQ2 Score 0 2   PHQ9 Score 0 2     Interpretation of Total Score Depression Severity: 1-4 = Minimal depression, 5-9 = Mild depression, 10-14 = Moderate depression, 15-19 = Moderately severe depression, 20-27 = Severe depression    Review of Systems  Constitutional: Negative for activity change, appetite change, chills, diaphoresis, fatigue, fever and unexpected weight change. HENT: Negative for sinus pressure, sinus pain, positive for sore throat and positive for trouble swallowing. Positive for dental pain. Respiratory: Negative for cough, shortness of breath and wheezing. Cardiovascular: Negative for chest pain, palpitations and leg swelling. Gastrointestinal: Negative for abdominal pain, diarrhea, nausea and vomiting. Endocrine: Negative for cold intolerance, polydipsia, polyphagia and polyuria. Genitourinary: Negative for difficulty urinating, flank pain and frequency. Musculoskeletal: Negative for gait problem and joint swelling. Negative for back pain, neck pain and neck stiffness. Skin: Negative for color change and wound. Negative for pallor and rash. Allergic/Immunologic: Negative for environmental allergies and food allergies. Neurological: Negative for light-headedness, numbness and headaches. Psychiatric/Behavioral: Negative for sleep disturbance. Negative for confusion and suicidal ideas. Objective:    BP (!) 146/88   Pulse (!) 103   Ht 5' 9\" (1.753 m)   Wt 261 lb (118.4 kg)   SpO2 96%   BMI 38.54 kg/m²    BP Readings from Last 3 Encounters:   03/01/23 (!) 146/88   02/20/23 (!) 143/85   02/20/23 (!) 162/84     Physical Exam  Constitutional: Patient is oriented to person, place, and time. Patient appears well-developed and well-nourished. No distress. HENT: Head: Normocephalic and atraumatic.  Pain in the right anterior neck, slight mass present. Poor dentition noted. Neck mass palpable along the right SCM, approx size [id number], soft-tender to palpation. Eyes: Pupils are equal, round, and reactive to light. Conjunctivae are normal. Right eye exhibits no discharge. Left eye exhibits no discharge. Cardiovascular: Normal rate, regular rhythm and normal heart sounds. Pulmonary/Chest: Effort normal and breath sounds normal. No respiratory distress. Patient has no wheezes. Abdominal: Soft. Bowel sounds are normal. Patient exhibits no distension. There is no tenderness. Musculoskeletal:  Patient exhibits no edema and tenderness. Patient exhibits no deformity. Neurological: Patient is alert and oriented to person, place, and time. Skin: Skin is warm and dry. Patient is not diaphoretic. Psychiatric: Patient's speech is normal and behavior is normal. Thought content normal.   Vitals reviewed. Lab Results   Component Value Date    WBC 5.3 02/25/2023    HGB 13.0 02/25/2023    HCT 39.8 (L) 02/25/2023     02/25/2023    CHOL 214 (H) 02/25/2023    TRIG 555 (H) 02/25/2023    HDL 33 (L) 02/25/2023    LDLDIRECT 90 02/25/2023    ALT 38 02/25/2023    AST 25 02/25/2023     02/25/2023    K 4.6 02/25/2023     02/25/2023    CREATININE 0.96 02/25/2023    BUN 36 (H) 02/25/2023    CO2 27 02/25/2023    TSH 3.01 04/19/2022    LABA1C 8.3 02/20/2023    LABMICR Can not be calculated 05/03/2022     Lab Results   Component Value Date    CALCIUM 9.2 02/25/2023     Lab Results   Component Value Date    LDLCHOLESTEROL      02/25/2023    LDLDIRECT 90 02/25/2023       Please note that this chart was generated using voice recognition Dragon dictation software. Although every effort was made to ensure the accuracy of this automated transcription, some errors in transcription may have occurred.     Electronically signed by Dr. Oz Watts MD on 3/1/2023 at 11:34 AM

## 2023-03-03 ENCOUNTER — TELEPHONE (OUTPATIENT)
Dept: PRIMARY CARE CLINIC | Age: 63
End: 2023-03-03

## 2023-03-03 NOTE — TELEPHONE ENCOUNTER
Patient informed. Also advised to report to ER with any SOB or increased difficulty swallowing/increase in swelling.

## 2023-03-03 NOTE — TELEPHONE ENCOUNTER
Patient called to update you on his symptoms-he states he is still having pain and difficulty swallowing but is has improved since he was in on 03/01/23 and he has been able to eat, however he states he now has swelling and reports 2 golf ball size lumps at the back of each cheek.

## 2023-03-03 NOTE — TELEPHONE ENCOUNTER
Thank you - plan for him to continue with the current management plans, check-in again on Monday and then I may provide him with more meds at the time. I do want him to have the CT scan and if symptoms worsen then I will order it STAT and consider having him evaluated by ENT as well, thank you.   Electronically signed by Thierry Rush MD on 3/3/2023 at 10:24 AM

## 2023-03-06 ENCOUNTER — HOSPITAL ENCOUNTER (OUTPATIENT)
Dept: CT IMAGING | Age: 63
Discharge: HOME | End: 2023-03-08
Payer: COMMERCIAL

## 2023-03-06 DIAGNOSIS — R22.1 NECK MASS: Primary | ICD-10-CM

## 2023-03-06 DIAGNOSIS — M54.2 NECK PAIN: ICD-10-CM

## 2023-03-06 DIAGNOSIS — R22.1 NECK MASS: ICD-10-CM

## 2023-03-06 DIAGNOSIS — M62.838 MUSCLE SPASM: ICD-10-CM

## 2023-03-06 DIAGNOSIS — J02.9 PHARYNGITIS, UNSPECIFIED ETIOLOGY: ICD-10-CM

## 2023-03-06 DIAGNOSIS — Z01.818 PRE-PROCEDURAL EXAMINATION: Primary | ICD-10-CM

## 2023-03-06 PROCEDURE — 70491 CT SOFT TISSUE NECK W/DYE: CPT

## 2023-03-06 PROCEDURE — 6360000004 HC RX CONTRAST MEDICATION: Performed by: STUDENT IN AN ORGANIZED HEALTH CARE EDUCATION/TRAINING PROGRAM

## 2023-03-06 RX ORDER — LEVOFLOXACIN 750 MG/1
750 TABLET ORAL DAILY
Qty: 7 TABLET | Refills: 0 | Status: SHIPPED | OUTPATIENT
Start: 2023-03-06 | End: 2023-03-13

## 2023-03-06 RX ORDER — PREDNISONE 20 MG/1
20 TABLET ORAL 2 TIMES DAILY
Qty: 10 TABLET | Refills: 0 | Status: SHIPPED | OUTPATIENT
Start: 2023-03-06 | End: 2023-03-11

## 2023-03-06 RX ORDER — CYCLOBENZAPRINE HCL 10 MG
10 TABLET ORAL 3 TIMES DAILY PRN
Qty: 30 TABLET | Refills: 0 | Status: SHIPPED | OUTPATIENT
Start: 2023-03-06 | End: 2023-03-16

## 2023-03-06 RX ADMIN — IOPAMIDOL 75 ML: 755 INJECTION, SOLUTION INTRAVENOUS at 09:12

## 2023-03-06 NOTE — PROGRESS NOTES
Spoke with Nemo. Notified her that Dr. Kristopher Crespo would like STAT CT soft tissue neck imaging to be WITH contrast. He placed order and I notified Nemo that since this order was on the schedule already, the system would not allow me to cancel the order for CT soft tissue neck without contrast. Nemo verbalized she would let department know and requested BUN/Creatinine order be placed. Order placed.

## 2023-03-22 DIAGNOSIS — M1A.9XX0 CHRONIC GOUT WITHOUT TOPHUS, UNSPECIFIED CAUSE, UNSPECIFIED SITE: ICD-10-CM

## 2023-03-23 RX ORDER — ALLOPURINOL 300 MG/1
300 TABLET ORAL 2 TIMES DAILY
Qty: 180 TABLET | Refills: 0 | Status: SHIPPED | OUTPATIENT
Start: 2023-03-23 | End: 2023-05-22

## 2023-03-28 ENCOUNTER — TELEPHONE (OUTPATIENT)
Dept: PRIMARY CARE CLINIC | Age: 63
End: 2023-03-28

## 2023-03-28 DIAGNOSIS — M79.671 RIGHT FOOT PAIN: Primary | ICD-10-CM

## 2023-03-28 DIAGNOSIS — G25.81 RESTLESS LEG SYNDROME: ICD-10-CM

## 2023-03-28 NOTE — TELEPHONE ENCOUNTER
Patient needs another order for EMG. [facility] called and says that patient is coming in for EMG and needs a new order due to the one from last year being over due.

## 2023-04-25 ENCOUNTER — TELEPHONE (OUTPATIENT)
Dept: ONCOLOGY | Age: 63
End: 2023-04-25

## 2023-04-25 DIAGNOSIS — Z87.891 PERSONAL HISTORY OF NICOTINE DEPENDENCE: Primary | ICD-10-CM

## 2023-04-25 NOTE — TELEPHONE ENCOUNTER
Our records indicate that your patient is coming due for their annual lung cancer screening follow up testing. For your convenience, we have pended the order for the scan for you. If you do not agree with the need for the test, please cancel the order and let us know. Sincerely,    [address] [place] [address] Screening Program    Auto printed reminder letter sent to patient.

## 2023-05-22 ENCOUNTER — OFFICE VISIT (OUTPATIENT)
Dept: PRIMARY CARE CLINIC | Age: 63
End: 2023-05-22
Payer: COMMERCIAL

## 2023-05-22 VITALS
SYSTOLIC BLOOD PRESSURE: 152 MMHG | DIASTOLIC BLOOD PRESSURE: 84 MMHG | OXYGEN SATURATION: 96 % | WEIGHT: 268 LBS | HEART RATE: 86 BPM | HEIGHT: 69 IN | BODY MASS INDEX: 39.69 KG/M2

## 2023-05-22 DIAGNOSIS — E11.69 TYPE 2 DIABETES MELLITUS WITH OTHER SPECIFIED COMPLICATION, WITHOUT LONG-TERM CURRENT USE OF INSULIN (HCC): ICD-10-CM

## 2023-05-22 DIAGNOSIS — I10 PRIMARY HYPERTENSION: Primary | ICD-10-CM

## 2023-05-22 DIAGNOSIS — E78.1 HYPERTRIGLYCERIDEMIA: ICD-10-CM

## 2023-05-22 DIAGNOSIS — M54.12 CERVICAL RADICULOPATHY: ICD-10-CM

## 2023-05-22 DIAGNOSIS — G25.81 RESTLESS LEG SYNDROME: ICD-10-CM

## 2023-05-22 DIAGNOSIS — K21.9 GASTROESOPHAGEAL REFLUX DISEASE, UNSPECIFIED WHETHER ESOPHAGITIS PRESENT: ICD-10-CM

## 2023-05-22 DIAGNOSIS — M62.838 MUSCLE SPASM: ICD-10-CM

## 2023-05-22 DIAGNOSIS — G47.00 INSOMNIA, UNSPECIFIED TYPE: ICD-10-CM

## 2023-05-22 DIAGNOSIS — E78.5 HYPERLIPIDEMIA, UNSPECIFIED HYPERLIPIDEMIA TYPE: ICD-10-CM

## 2023-05-22 PROCEDURE — 3079F DIAST BP 80-89 MM HG: CPT | Performed by: STUDENT IN AN ORGANIZED HEALTH CARE EDUCATION/TRAINING PROGRAM

## 2023-05-22 PROCEDURE — 3017F COLORECTAL CA SCREEN DOC REV: CPT | Performed by: STUDENT IN AN ORGANIZED HEALTH CARE EDUCATION/TRAINING PROGRAM

## 2023-05-22 PROCEDURE — 99214 OFFICE O/P EST MOD 30 MIN: CPT | Performed by: STUDENT IN AN ORGANIZED HEALTH CARE EDUCATION/TRAINING PROGRAM

## 2023-05-22 PROCEDURE — G8427 DOCREV CUR MEDS BY ELIG CLIN: HCPCS | Performed by: STUDENT IN AN ORGANIZED HEALTH CARE EDUCATION/TRAINING PROGRAM

## 2023-05-22 PROCEDURE — 2022F DILAT RTA XM EVC RTNOPTHY: CPT | Performed by: STUDENT IN AN ORGANIZED HEALTH CARE EDUCATION/TRAINING PROGRAM

## 2023-05-22 PROCEDURE — G8417 CALC BMI ABV UP PARAM F/U: HCPCS | Performed by: STUDENT IN AN ORGANIZED HEALTH CARE EDUCATION/TRAINING PROGRAM

## 2023-05-22 PROCEDURE — 3077F SYST BP >= 140 MM HG: CPT | Performed by: STUDENT IN AN ORGANIZED HEALTH CARE EDUCATION/TRAINING PROGRAM

## 2023-05-22 PROCEDURE — 3052F HG A1C>EQUAL 8.0%<EQUAL 9.0%: CPT | Performed by: STUDENT IN AN ORGANIZED HEALTH CARE EDUCATION/TRAINING PROGRAM

## 2023-05-22 PROCEDURE — 1036F TOBACCO NON-USER: CPT | Performed by: STUDENT IN AN ORGANIZED HEALTH CARE EDUCATION/TRAINING PROGRAM

## 2023-05-22 RX ORDER — DULOXETIN HYDROCHLORIDE 60 MG/1
60 CAPSULE, DELAYED RELEASE ORAL DAILY
Qty: 90 CAPSULE | Refills: 1 | Status: SHIPPED | OUTPATIENT
Start: 2023-05-22

## 2023-05-22 RX ORDER — LISINOPRIL 20 MG/1
20 TABLET ORAL DAILY
Qty: 90 TABLET | Refills: 1 | Status: SHIPPED | OUTPATIENT
Start: 2023-05-22

## 2023-05-22 RX ORDER — METOPROLOL SUCCINATE 50 MG/1
25 TABLET, EXTENDED RELEASE ORAL 2 TIMES DAILY
Qty: 90 TABLET | Refills: 1 | Status: SHIPPED | OUTPATIENT
Start: 2023-05-22

## 2023-05-22 RX ORDER — CELECOXIB 200 MG/1
200 CAPSULE ORAL 2 TIMES DAILY
Qty: 60 CAPSULE | Refills: 0 | Status: SHIPPED | OUTPATIENT
Start: 2023-05-22

## 2023-05-22 RX ORDER — FUROSEMIDE 40 MG/1
40 TABLET ORAL DAILY
Qty: 90 TABLET | Refills: 1 | Status: SHIPPED | OUTPATIENT
Start: 2023-05-22

## 2023-05-22 RX ORDER — PREGABALIN 75 MG/1
75 CAPSULE ORAL 2 TIMES DAILY
Qty: 60 CAPSULE | Refills: 1 | Status: SHIPPED | OUTPATIENT
Start: 2023-05-22 | End: 2023-06-21

## 2023-05-22 RX ORDER — OMEPRAZOLE 20 MG/1
20 CAPSULE, DELAYED RELEASE ORAL DAILY
Qty: 90 CAPSULE | Refills: 1 | Status: SHIPPED | OUTPATIENT
Start: 2023-05-22

## 2023-05-22 RX ORDER — FENOFIBRATE 48 MG/1
48 TABLET, COATED ORAL DAILY
Qty: 90 TABLET | Refills: 1 | Status: SHIPPED | OUTPATIENT
Start: 2023-05-22

## 2023-05-22 RX ORDER — ROSUVASTATIN CALCIUM 20 MG/1
20 TABLET, COATED ORAL DAILY
Qty: 90 TABLET | Refills: 1 | Status: SHIPPED | OUTPATIENT
Start: 2023-05-22

## 2023-05-22 RX ORDER — PRAMIPEXOLE DIHYDROCHLORIDE 1.5 MG/1
1.5 TABLET ORAL NIGHTLY
Qty: 90 TABLET | Refills: 0 | Status: SHIPPED | OUTPATIENT
Start: 2023-05-22

## 2023-05-22 RX ORDER — BACLOFEN 10 MG/1
10 TABLET ORAL 3 TIMES DAILY
Qty: 42 TABLET | Refills: 0 | Status: SHIPPED | OUTPATIENT
Start: 2023-05-22 | End: 2023-06-05

## 2023-05-22 NOTE — PROGRESS NOTES
[facility], [address] , [facility], [address]    Kathy Reyez is a 58 y.o. male with  has a past medical history of Arthritis, CAD (coronary artery disease), Cancer ([facility] [address] Carpal tunnel syndrome, Diabetes mellitus ([facility] [address] Gastroesophageal reflux disease, Hyperlipidemia, Hypertension, and Movement disorder. Presented to the office today for:  Chief Complaint   Patient presents with    Hypertension    Diabetes    Hyperlipidemia    3 Month Follow-Up       Assessment/Plan   1. Primary hypertension  -     lisinopril (PRINIVIL;ZESTRIL) 20 MG tablet; Take 1 tablet by mouth daily, Disp-90 tablet, R-1Normal  -     metoprolol succinate (TOPROL XL) 50 MG extended release tablet; Take 0.5 tablets by mouth in the morning and 0.5 tablets in the evening., Disp-90 tablet, R-1Normal  -     furosemide (LASIX) 40 MG tablet; Take 1 tablet by mouth daily, Disp-90 tablet, R-1Normal  -     CBC with Auto Differential; Future  -     Comprehensive Metabolic Panel; Future  2. Hypertriglyceridemia  -     rosuvastatin (CRESTOR) 20 MG tablet; Take 1 tablet by mouth daily, Disp-90 tablet, R-1Normal  -     fenofibrate (TRICOR) 48 MG tablet; Take 1 tablet by mouth daily, Disp-90 tablet, R-1Normal  -     Lipid Panel; Future  3. Restless leg syndrome  -     pramipexole (MIRAPEX) 1.5 MG tablet; Take 1 tablet by mouth nightly, Disp-90 tablet, R-0Normal  4. Insomnia, unspecified type  5. Gastroesophageal reflux disease, unspecified whether esophagitis present  -     omeprazole (PRILOSEC) 20 MG delayed release capsule; Take 1 capsule by mouth daily, Disp-90 capsule, R-1Normal  6. Cervical radiculopathy  -     DULoxetine (CYMBALTA) 60 MG extended release capsule; Take 1 capsule by mouth daily, Disp-90 capsule, R-1Normal  -     celecoxib (CELEBREX) 200 MG capsule; Take 1 capsule by mouth 2 times daily, Disp-60 capsule, R-0Normal  -     pregabalin (LYRICA) 75 MG capsule;  Take 1 capsule by mouth 2 times

## 2023-05-26 ENCOUNTER — HOSPITAL ENCOUNTER (OUTPATIENT)
Age: 63
Discharge: HOME | End: 2023-05-26
Payer: COMMERCIAL

## 2023-05-26 DIAGNOSIS — E11.69 TYPE 2 DIABETES MELLITUS WITH OTHER SPECIFIED COMPLICATION, WITHOUT LONG-TERM CURRENT USE OF INSULIN (HCC): ICD-10-CM

## 2023-05-26 DIAGNOSIS — I10 PRIMARY HYPERTENSION: ICD-10-CM

## 2023-05-26 DIAGNOSIS — E78.1 HYPERTRIGLYCERIDEMIA: ICD-10-CM

## 2023-05-26 DIAGNOSIS — E78.5 HYPERLIPIDEMIA, UNSPECIFIED HYPERLIPIDEMIA TYPE: ICD-10-CM

## 2023-05-26 LAB
ALBUMIN SERPL-MCNC: 4.3 G/DL (ref 3.5–5.2)
ALBUMIN/GLOB SERPL: 1.8 {RATIO} (ref 1–2.5)
ALP SERPL-CCNC: 83 U/L (ref 40–129)
ALT SERPL-CCNC: 38 U/L (ref 5–41)
ANION GAP SERPL CALCULATED.3IONS-SCNC: 10 MMOL/L (ref 9–17)
AST SERPL-CCNC: 28 U/L
BASOPHILS # BLD: 0.03 K/UL (ref 0–0.2)
BASOPHILS NFR BLD: 1 % (ref 0–2)
BILIRUB SERPL-MCNC: 0.4 MG/DL (ref 0.3–1.2)
BUN SERPL-MCNC: 31 MG/DL (ref 8–23)
BUN/CREAT SERPL: 36 (ref 9–20)
CALCIUM SERPL-MCNC: 9.3 MG/DL (ref 8.6–10.4)
CHLORIDE SERPL-SCNC: 103 MMOL/L (ref 98–107)
CHOLEST SERPL-MCNC: 231 MG/DL
CHOLESTEROL/HDL RATIO: 6.2
CO2 SERPL-SCNC: 25 MMOL/L (ref 20–31)
CREAT SERPL-MCNC: 0.87 MG/DL (ref 0.7–1.2)
EOSINOPHIL # BLD: 0.12 K/UL (ref 0–0.44)
EOSINOPHILS RELATIVE PERCENT: 3 % (ref 1–4)
ERYTHROCYTE [DISTWIDTH] IN BLOOD BY AUTOMATED COUNT: 12.8 % (ref 11.8–14.4)
EST. AVERAGE GLUCOSE BLD GHB EST-MCNC: 192 MG/DL
GFR SERPL CREATININE-BSD FRML MDRD: >60 ML/MIN/1.73M2
GLUCOSE SERPL-MCNC: 138 MG/DL (ref 70–99)
HBA1C MFR BLD: 8.3 % (ref 4–6)
HCT VFR BLD AUTO: 38.8 % (ref 40.7–50.3)
HDLC SERPL-MCNC: 37 MG/DL
HGB BLD-MCNC: 12.6 G/DL (ref 13–17)
IMM GRANULOCYTES # BLD AUTO: <0.03 K/UL (ref 0–0.3)
IMM GRANULOCYTES NFR BLD: 0 %
LDLC SERPL CALC-MCNC: 154 MG/DL (ref 0–130)
LYMPHOCYTES # BLD: 18 % (ref 24–43)
LYMPHOCYTES NFR BLD: 0.8 K/UL (ref 1.1–3.7)
MCH RBC QN AUTO: 29.4 PG (ref 25.2–33.5)
MCHC RBC AUTO-ENTMCNC: 32.5 G/DL (ref 28.4–34.8)
MCV RBC AUTO: 90.4 FL (ref 82.6–102.9)
MONOCYTES NFR BLD: 0.39 K/UL (ref 0.1–1.2)
MONOCYTES NFR BLD: 9 % (ref 3–12)
NEUTROPHILS NFR BLD: 69 % (ref 36–65)
NEUTS SEG NFR BLD: 3 K/UL (ref 1.5–8.1)
NRBC AUTOMATED: 0 PER 100 WBC
PLATELET # BLD AUTO: 218 K/UL (ref 138–453)
PMV BLD AUTO: 9.1 FL (ref 8.1–13.5)
POTASSIUM SERPL-SCNC: 4.7 MMOL/L (ref 3.7–5.3)
PROT SERPL-MCNC: 6.7 G/DL (ref 6.4–8.3)
RBC # BLD AUTO: 4.29 M/UL (ref 4.21–5.77)
SODIUM SERPL-SCNC: 138 MMOL/L (ref 135–144)
TRIGL SERPL-MCNC: 202 MG/DL
WBC OTHER # BLD: 4.4 K/UL (ref 3.5–11.3)

## 2023-05-26 PROCEDURE — 85025 COMPLETE CBC W/AUTO DIFF WBC: CPT

## 2023-05-26 PROCEDURE — 83036 HEMOGLOBIN GLYCOSYLATED A1C: CPT

## 2023-05-26 PROCEDURE — 80053 COMPREHEN METABOLIC PANEL: CPT

## 2023-05-26 PROCEDURE — 36415 COLL VENOUS BLD VENIPUNCTURE: CPT

## 2023-05-26 PROCEDURE — 80061 LIPID PANEL: CPT

## 2023-05-30 DIAGNOSIS — E78.1 HYPERTRIGLYCERIDEMIA: ICD-10-CM

## 2023-05-30 RX ORDER — ROSUVASTATIN CALCIUM 40 MG/1
40 TABLET, COATED ORAL DAILY
Qty: 90 TABLET | Refills: 1 | Status: SHIPPED | OUTPATIENT
Start: 2023-05-30

## 2023-07-06 ENCOUNTER — OFFICE VISIT (OUTPATIENT)
Dept: GASTROENTEROLOGY | Age: 63
End: 2023-07-06
Payer: COMMERCIAL

## 2023-07-06 VITALS
HEIGHT: 69 IN | HEART RATE: 79 BPM | BODY MASS INDEX: 39.69 KG/M2 | RESPIRATION RATE: 18 BRPM | SYSTOLIC BLOOD PRESSURE: 145 MMHG | DIASTOLIC BLOOD PRESSURE: 66 MMHG | WEIGHT: 268 LBS

## 2023-07-06 DIAGNOSIS — K21.9 CHRONIC GERD: Primary | ICD-10-CM

## 2023-07-06 DIAGNOSIS — Z85.810 HISTORY OF CANCER OF LINGUAL TONSIL: ICD-10-CM

## 2023-07-06 PROCEDURE — 3078F DIAST BP <80 MM HG: CPT | Performed by: NURSE PRACTITIONER

## 2023-07-06 PROCEDURE — 3017F COLORECTAL CA SCREEN DOC REV: CPT | Performed by: NURSE PRACTITIONER

## 2023-07-06 PROCEDURE — G8417 CALC BMI ABV UP PARAM F/U: HCPCS | Performed by: NURSE PRACTITIONER

## 2023-07-06 PROCEDURE — 99202 OFFICE O/P NEW SF 15 MIN: CPT | Performed by: NURSE PRACTITIONER

## 2023-07-06 PROCEDURE — 1036F TOBACCO NON-USER: CPT | Performed by: NURSE PRACTITIONER

## 2023-07-06 PROCEDURE — 3077F SYST BP >= 140 MM HG: CPT | Performed by: NURSE PRACTITIONER

## 2023-07-06 PROCEDURE — G8427 DOCREV CUR MEDS BY ELIG CLIN: HCPCS | Performed by: NURSE PRACTITIONER

## 2023-07-06 ASSESSMENT — ENCOUNTER SYMPTOMS
STRIDOR: 0
COUGH: 1
CHEST TIGHTNESS: 0
ANAL BLEEDING: 0
ABDOMINAL PAIN: 0
WHEEZING: 0
BLOOD IN STOOL: 0
SHORTNESS OF BREATH: 0
TROUBLE SWALLOWING: 0
ALLERGIC/IMMUNOLOGIC NEGATIVE: 1
GASTROINTESTINAL NEGATIVE: 1

## 2023-07-06 NOTE — PROGRESS NOTES
of the  mA/kV was utilized to reduce the radiation dose to as low as reasonably  achievable. COMPARISON:  None. HISTORY:  ORDERING SYSTEM PROVIDED HISTORY: Neck mass  TECHNOLOGIST PROVIDED HISTORY:  STAT Creatinine as needed:->Yes  Neck mass, hx of throat Ca    FINDINGS:  PHARYNX/LARYNX:  The palatine tonsils are normal in appearance. The tongue  is normal in appearance. The valleculae, epiglottis, aryepiglottic folds and  pyriform sinuses appear unremarkable. The true and false vocal cords are  normal in appearance. No mass or abscess is seen. SALIVARY GLANDS/THYROID:  The parotid and submandibular glands appear  unremarkable. The thyroid gland appears unremarkable. LYMPH NODES:  No cervical or supraclavicular lymphadenopathy is seen. SOFT TISSUES:  No appreciable soft tissue swelling or mass is seen. BRAIN/ORBITS/SINUSES:  The visualized portion of the intracranial contents  appear unremarkable. The visualized portion of the orbits, paranasal sinuses  and mastoid air cells demonstrate no acute abnormality. LUNG APICES/SUPERIOR MEDIASTINUM:  No focal consolidation is seen within the  visualized lung apices. No superior mediastinal lymphadenopathy or mass. The visualized portion of the trachea appears unremarkable. BONES:  No aggressive appearing lytic or blastic bony lesion. Impression  No acute abnormality of the soft tissue structures of the neck. Colonoscopy September 21, 2021:  Findings: The entire examined colon appeared normal.  Estimated Blood Loss: Estimated blood loss: none. Impression:   - The entire examined colon is normal.  - No specimens collected. Recommendation:   - Discharge patient to home. Assessment  Sujata Gilliam is a 58 y.o. old male who has a past medical history of tonsillar cancer, arthritis, coronary artery disease s/p CABG 2011, hypertension, gout, hyperlipidemia, hypertriglyceridemia, presenting for concern for worsening GERD.   Discussed

## 2023-07-09 NOTE — PROGRESS NOTES
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  He initially presented on 1/7/2023 with nausea/vomiting, abdominal pain, worsening diarrhea.  He thought this was related to UC flare.  He had CT imaging that showed mildly enlarged mesenteric lymph nodes but otherwise unrevealing.  He had CBC that was notable for atypical lymphocytes versus blasts.  Peripheral blood flow consistent with acute leukemia, mixed phenotype (32% blasts, expressing CD13, CD33, CD10, CD19, CD79a, CD34, CD45, , cTdT, HLA-DR; NEGATIVE CD20 and MPO).  He feels overall well this morning.  He says bowel discomfort and diarrhea has essentially resolved.  He denies any fevers.  He denies any recent weight loss, drenching night sweats, fevers/chills.  He does not smoke.  He does not drink alcohol.  He lives with his mom, dad, and younger brother.  He has history of UC that was diagnosed at age 3-4.  Mother believes he was on mesalamine and steroids for some time.  He then had complications in 2020 that resulted in colectomy and ostomy placement followed by reversal.  He has chronic diarrhea, 3-4 bowel movements per day on average.  Singh received 4 cycles of HyperCVAD + Dasatinib prior to evaluation for SCT. Bone marrow biopsy prior to SCT with no evidence of leukemia, BCR/ABL negative.      07/05/23: Haploidentical SCT D-8: Singh was directly admitted to  this morning to begin conditioning prior to haploidentical SCT from his brother. Trifusion catheter placed  The patient has been directly admitted to [facility] from [address] have been checked and are within the normal except for his B/P being elevated. Physical assessment is also completed at this time. Patient is complaining of some pain to his RLQ and left lower back but denies any pain medication at this time. Further needs are assessed. Safety precautions are on place. Will complete the admission and follow the admission the orders. this morning in IR. Singh reports feeling well at home, no recent viral illnesses. Denies nausea, vomiting, diarrhea, fevers, chills, shortness of breath, or chest pain. Started Carvedilol per Dr. Seay, will continue on admission. Plan to proceed with chemotherapy tomorrow morning per protocol.      07/06/23: Haploidentical SCT D-7: Singh was directly admitted to  this yesterday to begin conditioning prior to haploidentical SCT from his brother. Feeling tired this morning due to sleep interruptions light night. Denies nausea, vomiting, diarrhea, fevers, chills, shortness of breath, or chest pain. No bleeding.     07/07/23: Haploidentical SCT D-6: No acute events overnight. Singh is tolerating chemotherapy well without adverse events. Denies nausea, vomiting, fevers, chills, shortness of breath, or chest pain. Diarrhea per baseline 2/2 ulcerative colitis, improved with steroid premedications for chemotherapy. Denies blood in stool. Plan to proceed with chemotherapy per protocol.     July 8, 2023: Haploidentical allogeneic BMT, day -5.  Overall doing well, tolerating chemotherapy well.  He continues to eat and ambulate.  He denies any episodes of nausea, vomiting, diarrhea, chills, cough, SOB, TUCKER or chest pain.    July 9, 2023: Haploidentical allogeneic BMT, day -4.  Continues to do well and denies any nausea vomiting or diarrhea.  Fairly uneventful 24 hours.  Last dose of  busulfan today.    Review of Systems   Constitutional: Positive for activity change and appetite change. Negative for chills, fatigue and fever.   HENT: Negative for mouth sores, nosebleeds and sore throat.    Eyes: Negative.    Respiratory: Negative for cough and shortness of breath.    Cardiovascular: Negative for chest pain, palpitations and leg swelling.   Gastrointestinal: Positive for diarrhea ( 2-3 loose bowel movements at baseline). Negative for abdominal pain, constipation, nausea and vomiting.   Endocrine: Negative.     Genitourinary: Negative for difficulty urinating.   Musculoskeletal: Negative for back pain, joint swelling and myalgias.   Skin: Negative for pallor and rash.   Allergic/Immunologic: Negative.    Neurological: Negative for tremors, syncope, weakness and headaches.   Psychiatric/Behavioral: Negative for confusion. The patient is not nervous/anxious.        ALLERGIES:  No Known Allergies    Current Facility-Administered Medications   Medication Dose Route Frequency Provider Last Rate Last Admin   • sodium chloride 0.9 % flush bag 25 mL  25 mL Intravenous PRN Sturmwind, Rebekah, APNP       • sodium chloride (PF) 0.9 % injection 10 mL  10 mL Injection PRN Sturmwind, Rebekah, APNP       • sodium chloride (PF) 0.9 % injection 10 mL  10 mL Injection 2 times per day Sturmwind, Rebekah, APNP   10 mL at 07/09/23 0848   • sodium chloride (PF) 0.9 % injection 20 mL  20 mL Injection PRN Sturmwind, Rebekah, APNP       • enoxaparin (LOVENOX) injection 40 mg  40 mg Subcutaneous Nightly Sturmwind, Rebekah, APNP   40 mg at 07/05/23 2231   • potassium CHLORIDE 40 mEq/100 mL IVPB premix  40 mEq Intravenous PRN Sturmwind, Rebekah, APNP 40 mL/hr at 07/09/23 0504 Rate Verify at 07/09/23 0504   • magnesium sulfate 4 g in sterile water IVPB  4 g Intravenous PRN Sturmwind, Rebekah, APNP       • calcium gluconate 4 g in sodium chloride 0.9 % 250 mL IVPB   Intravenous PRN Sturmwind, Rebekah, APNP       • sodium PHOSPHATE 20 mmol in sodium chloride 0.9 % 250 mL IVPB  20 mmol Intravenous PRN Sturmwind, Rebekah, APNP       • sodium PHOSPHATE 40 mmol in sodium chloride 0.9 % 500 mL IVPB  40 mmol Intravenous PRN Sturmwind, Rebekah, APNP       • potassium phosphate 20 mmol in sodium chloride 0.9 % 250 mL IVPB  20 mmol Intravenous PRN Sturmwind, Rebekah, APNP       • potassium phosphate 40 mmol in sodium chloride 0.9 % 500 mL IVPB  40 mmol Intravenous PRN Sturmwind, Rebekah, APNP       • [START ON 7/20/2023] heparin (porcine) 100 UNIT/ML lock flush 80 Units  80 Units  Intracatheter PRN Rebekah Gresham APNP       • [START ON 7/20/2023] heparin (porcine) 100 UNIT/ML lock flush 80 Units  80 Units Intracatheter PRN eRbekah Gresham APNP       • [START ON 7/20/2023] heparin (porcine) 100 UNIT/ML lock flush 90 Units  90 Units Intracatheter PRN Rebekah Gresham APNP       • levETIRAcetam (KepPRA) tablet 500 mg  500 mg Oral Q12H Montana Berry MD   500 mg at 07/09/23 0846   • sodium chloride 0.9 % flush bag 250 mL  250 mL Intravenous Once PRN Montana Berry MD       • sodium chloride 0.9% infusion   Intravenous Continuous Montana Berry MD 83 mL/hr at 07/09/23 0504 Rate Verify at 07/09/23 0504   • [START ON 7/16/2023] sodium chloride 0.9% infusion  1,000 mL Intravenous Continuous Montana Berry MD       • [START ON 7/20/2023] sodium chloride 0.9% infusion  1,000 mL Intravenous Continuous Montana Berry MD       • furosemide (LASIX INJECT) injection 20 mg  20 mg Intravenous BID PRN Montana Berry MD   20 mg at 07/09/23 0622   • LORazepam (ATIVAN) tablet 1 mg  1 mg Oral Q6H PRN Montana Berry MD       • prochlorperazine (COMPAZINE) injection 10 mg  10 mg Intravenous Q6H PRN Montana Berry MD       • ursodiol (ACTIGALL) capsule 300 mg  300 mg Oral BID Montana Berry MD   300 mg at 07/09/23 0846   • ondansetron (ZOFRAN) tablet 16 mg  16 mg Oral Q24H Montana Berry MD   16 mg at 07/09/23 0328   • fludarabine (FLUDARA) 43 mg in sodium chloride 0.9 % 100 mL chemo infusion  25 mg/m2 (Treatment Plan Recorded) Intravenous Q24H Montana Berry MD   Completed at 07/09/23 0501   • [START ON 7/10/2023] mesna (MESNEX) 310 mg in sodium chloride 0.9 % 100 mL infusion  5 mg/kg (Treatment Plan Recorded) Intravenous Q4H Montana Berry MD       • [START ON 7/11/2023] mesna (MESNEX) 310 mg in sodium chloride 0.9 % 100 mL infusion  5 mg/kg (Treatment Plan Recorded) Intravenous Q4H Montana Berry MD       • [START ON 7/10/2023] cyclophosphamide  (CYTOXAN) 888 mg in sodium chloride 0.9 % 250 mL chemo infusion  14.5 mg/kg (Treatment Plan Recorded) Intravenous Q24H Montana Berry MD       • [START ON 7/13/2023] acetaminophen (TYLENOL) tablet 650 mg  650 mg Oral Once Montana Berry MD       • [START ON 7/13/2023] diphenhydrAMINE (BENADRYL) capsule 25 mg  25 mg Oral Once Montana Berry MD       • [START ON 7/16/2023] fosaprepitant (EMEND) 150 mg in sodium chloride 0.9 % 100 mL IVPB  150 mg Intravenous Once Montana Berry MD       • [START ON 7/18/2023] filgrastim-sndz (G-CSF) (ZARXIO) injection 480 mcg  5 mcg/kg (Treatment Plan Recorded) Subcutaneous Daily at noon Montana Berry MD       • [START ON 7/13/2023] valACYclovir (VALTREX) tablet 500 mg  500 mg Oral BID Montana Berry MD       • [START ON 7/13/2023] levoFLOXacin (LEVAQUIN) tablet 500 mg  500 mg Oral QAM AC Montana Berry MD       • sodium chloride (NORMAL SALINE) 0.9 % bolus 1,000 mL  1,000 mL Intravenous Once PRN Montana Berry MD       • sodium chloride (NORMAL SALINE) 0.9 % bolus 1,000 mL  1,000 mL Intravenous Once PRN Montana Berry MD       • EPINEPHrine (ADRENALIN) injection 0.3 mg  0.3 mg Intramuscular Q5 Min PRN Montana Berry MD       • diphenhydrAMINE (BENADRYL) injection 50 mg  50 mg Intravenous Once PRN Montana Berry MD       • famotidine (PEPCID) injection 20 mg  20 mg Intravenous Once PRN Montana Berry MD       • methylPREDNISolone (SOLU-Medrol) injection 125 mg  125 mg Intravenous Once PRN Montana Berry MD       • albuterol inhaler 2 puff  2 puff Inhalation Q20 Min PRN Montana Berry MD       • albuterol (VENTOLIN) nebulizer 5 mg  5 mg Nebulization Q20 Min PRN Montana Berry MD       • morphine injection 2 mg  2 mg Intravenous PRN Montana Berry MD       • [START ON 7/18/2023] TACROlimus (PROGRAF) 1.8 mg in dextrose 5 % 240 mL total volume IVPB  1.8 mg Intravenous Q24H Montana Berry MD       • [START  ON 7/16/2023] ondansetron (ZOFRAN) tablet 16 mg  16 mg Oral Q24H Montana Berry MD       • [START ON 7/16/2023] mesna (MESNEX) 770 mg in sodium chloride 0.9 % 100 mL infusion  12.5 mg/kg (Treatment Plan Recorded) Intravenous 4x Daily Montana Berry MD       • carvedilol (COREG) tablet 3.125 mg  3.125 mg Oral BID WC Rebekah Gresham APNP   3.125 mg at 07/09/23 0846   • [START ON 7/10/2023] dexAMETHasone (DECADRON) tablet 8 mg  8 mg Oral Daily Montana Berry MD       • [START ON 7/16/2023] cyclophosphamide (CYTOXAN) 3,000 mg in sodium chloride 0.9 % 500 mL chemo infusion  50 mg/kg (Treatment Plan Recorded) Intravenous Q24H Montana Berry MD       • [START ON 7/18/2023] voriconazole (VFEND) tablet 200 mg  200 mg Oral BID Montana Berry MD       • [START ON 7/13/2023] micafungin (MYCAMINE) 100 mg in sodium chloride 0.9 % 100 mL IVPB  100 mg Intravenous Q24H Montana Berry MD       • [START ON 7/18/2023] mycophenolate (CELLCEPT) tablet 1,000 mg  15 mg/kg (Treatment Plan Recorded) Oral 3 times per day Montana Berry MD           Vital Last Value 24 Hour Range   Temperature 97.9 °F (36.6 °C) (07/09/23 0514) Temp  Min: 97.5 °F (36.4 °C)  Max: 97.9 °F (36.6 °C)   Pulse 77 (07/09/23 0846) Pulse  Min: 63  Max: 77   Respiratory 17 (07/09/23 0010) Resp  Min: 16  Max: 17   Non-Invasive  Blood Pressure 114/72 (07/09/23 0846) BP  Min: 114/72  Max: 136/79   Pulse Oximetry 96 % (07/09/23 0514) SpO2  Min: 96 %  Max: 100 %   Arterial   Blood Pressure   No data recorded      Wt Readings from Last 1 Encounters:   07/09/23 63.5 kg (139 lb 15.9 oz)          Weight change: -0.4 kg (-14.1 oz)       Intake/Output Summary (Last 24 hours) at 7/9/2023 1019  Last data filed at 7/9/2023 0600  Gross per 24 hour   Intake 2415.46 ml   Output 2525 ml   Net -109.54 ml       Physical Exam  Constitutional:       General: He is not in acute distress.     Appearance: Normal appearance.   HENT:      Mouth/Throat:       Mouth: Mucous membranes are moist.      Pharynx: Oropharynx is clear.      Neck: Normal range of motion and neck supple.   Eyes:      Extraocular Movements: Extraocular movements intact.      Pupils: Pupils are equal, round, and reactive to light.   Cardiovascular:      Rate and Rhythm: Normal rate and regular rhythm.      Pulses: Normal pulses.      Heart sounds: Normal heart sounds.   Pulmonary:      Effort: Pulmonary effort is normal.      Breath sounds: Normal breath sounds.   Abdominal:      General: Abdomen is flat. There is no distension.      Palpations: Abdomen is soft.      Tenderness: There is no abdominal tenderness.   Musculoskeletal:         General: Normal range of motion.   Skin:     General: Skin is warm and dry.   Neurological:      Mental Status: He is alert. Mental status is at baseline.   Psychiatric:         Mood and Affect: Mood normal.         Behavior: Behavior normal.       Laboratory Results  Recent Results (from the past 24 hour(s))   Magnesium    Collection Time: 07/09/23  3:20 AM   Result Value Ref Range    Magnesium 1.9 1.7 - 2.4 mg/dL   Phosphorus    Collection Time: 07/09/23  3:20 AM   Result Value Ref Range    Phosphorus 3.2 2.4 - 4.7 mg/dL   Comprehensive Metabolic Panel    Collection Time: 07/09/23  3:20 AM   Result Value Ref Range    Fasting Status      Sodium 138 135 - 145 mmol/L    Potassium 3.2 (L) 3.4 - 5.1 mmol/L    Chloride 107 97 - 110 mmol/L    Carbon Dioxide 26 21 - 32 mmol/L    Anion Gap 8 7 - 19 mmol/L    Glucose 109 (H) 70 - 99 mg/dL    BUN 12 6 - 20 mg/dL    Creatinine 0.68 0.67 - 1.17 mg/dL    Glomerular Filtration Rate >90 >=60    BUN/Cr 18 7 - 25    Calcium 8.8 8.4 - 10.2 mg/dL    Bilirubin, Total 0.2 0.2 - 1.0 mg/dL    GOT/AST 15 <=37 Units/L    GPT/ALT 20 <64 Units/L    Alkaline Phosphatase 95 45 - 117 Units/L    Albumin 3.4 (L) 3.6 - 5.1 g/dL    Protein, Total 6.1 (L) 6.4 - 8.2 g/dL    Globulin 2.7 2.0 - 4.0 g/dL    A/G Ratio 1.3 1.0 - 2.4   Uric Acid     Collection Time: 07/09/23  3:20 AM   Result Value Ref Range    Uric Acid 3.0 (L) 3.5 - 7.2 mg/dL   CBC with Automated Differential (performable only)    Collection Time: 07/09/23  3:20 AM   Result Value Ref Range    WBC 4.7 4.2 - 11.0 K/mcL    RBC 4.32 (L) 4.50 - 5.90 mil/mcL    HGB 12.6 (L) 13.0 - 17.0 g/dL    HCT 36.8 (L) 39.0 - 51.0 %    MCV 85.2 78.0 - 100.0 fl    MCH 29.2 26.0 - 34.0 pg    MCHC 34.2 32.0 - 36.5 g/dL    RDW-CV 14.0 11.0 - 15.0 %    RDW-SD 43.4 39.0 - 50.0 fL     140 - 450 K/mcL    NRBC 0 <=0 /100 WBC    Neutrophil, Percent 80 %    Lymphocytes, Percent 12 %    Mono, Percent 8 %    Eosinophils, Percent 0 %    Basophils, Percent 0 %    Immature Granulocytes 0 %    Absolute Neutrophils 3.7 1.8 - 7.7 K/mcL    Absolute Lymphocytes 0.5 (L) 1.0 - 4.8 K/mcL    Absolute Monocytes 0.4 0.3 - 0.9 K/mcL    Absolute Eosinophils  0.0 0.0 - 0.5 K/mcL    Absolute Basophils 0.0 0.0 - 0.3 K/mcL    Absolute Immature Granulocytes 0.0 0.0 - 0.2 K/mcL       Imaging  IR TUNNELED CENTRAL LINE INSERTION AGE 4 OR OLDER   Final Result   Impression:      Status post left IJ tunneled Trifusion catheter placement.  Ready for use.      Pursestring suture removal in 2-3 days.      Electronically Signed by: AMBAR LYLE M.D.    Signed on: 7/5/2023 12:04 PM    Workstation ID: [id number]          Assessment and Plan  Active Hospital Problems    Diagnosis    • At high risk for infection due to chemotherapy    • Stem cells transplant status (CMD)    • Dilated cardiomyopathy (CMD)    • [place] chromosome positive acute lymphoblastic leukemia (ALL) (CMD)    • Diarrhea    • Ulcerative colitis in pediatric patient (CMD)    Bu/Flu/Cy+Cy  Haploidentical Stem Cell Transplant  - Day -7     - Busulfan  - Day -6     - Busulfan     - Fludarabine  - Day -5     - Busulfan      - Fludarabine  - Day -4     - Busulfan     - Fludarabine  - Day -3     - Fludarabine     - Cytoxan     - Mesna  - Day -2     - Fludarabine     - Cytoxan      - Mesna  - Day 0     - Stem Cell Infusion     - Infectious Disease Prophylaxis         - Micafungin 50 mg IVPB Daily        - Levofloxacin 500 mg PO Daily        - Valtrex 500 mg PO Q12H  - Day +3      - Cytoxan     - Mesna  - Day +4     - Cytoxan      - Mesna  - Day +5     - GVHD/Rejection Prophylaxis         - Tacrolimus         - Mycophenolate     - Growth Factors         - Neupogen     Hematology:  - Transfuse 1u pRBCs for hemoglobin < 7.0  - Transfuse 1u platelets for platelets < 10, or <20 if actively bleeding      Infectious Disease Prophylaxis:  - Levofloxacin 500 mg PO Daily  - Micafungin 50mg IVPB daily  - Valtrex 500 mg PO Q12H     Hx Cardiomyopathy:  - Continue Carvedilol 3.125mg BID      FEN/GI:  - Replete electrolytes per paper orders in patient's chart  - Fluids per chemotherapy protocol      DVT Prophylaxis:  - Lovenox 40mg daily  - Discontinue when platelets < 50     KPS Score: 80%

## 2023-07-18 NOTE — PROGRESS NOTES
Patient instructed on the pre-operative, intra-operative, and post-operative process. Patient instructed on NPO status. Medication instructions and pre operative instruction sheet reviewed over the phone. Instructed pt to stop celebrex and all OTC vitamins 7 days prior to surgery and to take lyrica, cymbalta, and metoprolol with a small sip of water prior to arriving to the hospital the day of surgery. Pt will complete EKG 7/21/23.

## 2023-07-21 ENCOUNTER — HOSPITAL ENCOUNTER (OUTPATIENT)
Age: 63
Discharge: HOME | End: 2023-07-21
Attending: STUDENT IN AN ORGANIZED HEALTH CARE EDUCATION/TRAINING PROGRAM
Payer: COMMERCIAL

## 2023-07-21 ENCOUNTER — HOSPITAL ENCOUNTER (OUTPATIENT)
Dept: CT IMAGING | Age: 63
Discharge: HOME | End: 2023-07-21
Attending: STUDENT IN AN ORGANIZED HEALTH CARE EDUCATION/TRAINING PROGRAM
Payer: COMMERCIAL

## 2023-07-21 DIAGNOSIS — Z87.891 PERSONAL HISTORY OF NICOTINE DEPENDENCE: ICD-10-CM

## 2023-07-21 DIAGNOSIS — Z01.818 PREOP TESTING: ICD-10-CM

## 2023-07-21 PROCEDURE — 71271 CT THORAX LUNG CANCER SCR C-: CPT

## 2023-07-21 PROCEDURE — 93005 ELECTROCARDIOGRAM TRACING: CPT | Performed by: INTERNAL MEDICINE

## 2023-07-23 LAB
EKG ATRIAL RATE: 72 BPM
EKG P AXIS: 41 DEGREES
EKG P-R INTERVAL: 188 MS
EKG Q-T INTERVAL: 400 MS
EKG QRS DURATION: 110 MS
EKG QTC CALCULATION (BAZETT): 438 MS
EKG R AXIS: 44 DEGREES
EKG T AXIS: 33 DEGREES
EKG VENTRICULAR RATE: 72 BPM

## 2023-07-31 ENCOUNTER — ANESTHESIA EVENT (OUTPATIENT)
Dept: OPERATING ROOM | Age: 63
End: 2023-07-31
Payer: COMMERCIAL

## 2023-08-01 ENCOUNTER — ANESTHESIA (OUTPATIENT)
Dept: OPERATING ROOM | Age: 63
End: 2023-08-01
Payer: COMMERCIAL

## 2023-08-01 ENCOUNTER — HOSPITAL ENCOUNTER (OUTPATIENT)
Age: 63
Setting detail: OUTPATIENT SURGERY
Discharge: HOME | End: 2023-08-01
Attending: INTERNAL MEDICINE | Admitting: INTERNAL MEDICINE
Payer: COMMERCIAL

## 2023-08-01 VITALS
TEMPERATURE: 97.6 F | OXYGEN SATURATION: 97 % | HEART RATE: 77 BPM | RESPIRATION RATE: 16 BRPM | DIASTOLIC BLOOD PRESSURE: 63 MMHG | SYSTOLIC BLOOD PRESSURE: 141 MMHG | BODY MASS INDEX: 38.8 KG/M2 | HEIGHT: 69 IN | WEIGHT: 262 LBS

## 2023-08-01 DIAGNOSIS — Z01.818 PREOP TESTING: Primary | ICD-10-CM

## 2023-08-01 DIAGNOSIS — K21.9 GASTROESOPHAGEAL REFLUX DISEASE, UNSPECIFIED WHETHER ESOPHAGITIS PRESENT: ICD-10-CM

## 2023-08-01 PROCEDURE — 3700000001 HC ADD 15 MINUTES (ANESTHESIA): Performed by: INTERNAL MEDICINE

## 2023-08-01 PROCEDURE — 7100000010 HC PHASE II RECOVERY - FIRST 15 MIN: Performed by: INTERNAL MEDICINE

## 2023-08-01 PROCEDURE — 88305 TISSUE EXAM BY PATHOLOGIST: CPT

## 2023-08-01 PROCEDURE — 2709999900 HC NON-CHARGEABLE SUPPLY: Performed by: INTERNAL MEDICINE

## 2023-08-01 PROCEDURE — 2580000003 HC RX 258: Performed by: NURSE ANESTHETIST, CERTIFIED REGISTERED

## 2023-08-01 PROCEDURE — 43239 EGD BIOPSY SINGLE/MULTIPLE: CPT | Performed by: INTERNAL MEDICINE

## 2023-08-01 PROCEDURE — 3609012400 HC EGD TRANSORAL BIOPSY SINGLE/MULTIPLE: Performed by: INTERNAL MEDICINE

## 2023-08-01 PROCEDURE — 3700000000 HC ANESTHESIA ATTENDED CARE: Performed by: INTERNAL MEDICINE

## 2023-08-01 PROCEDURE — 7100000011 HC PHASE II RECOVERY - ADDTL 15 MIN: Performed by: INTERNAL MEDICINE

## 2023-08-01 PROCEDURE — 2500000003 HC RX 250 WO HCPCS: Performed by: NURSE ANESTHETIST, CERTIFIED REGISTERED

## 2023-08-01 PROCEDURE — 6360000002 HC RX W HCPCS: Performed by: NURSE ANESTHETIST, CERTIFIED REGISTERED

## 2023-08-01 RX ORDER — SODIUM CHLORIDE, SODIUM LACTATE, POTASSIUM CHLORIDE, CALCIUM CHLORIDE 600; 310; 30; 20 MG/100ML; MG/100ML; MG/100ML; MG/100ML
INJECTION, SOLUTION INTRAVENOUS CONTINUOUS
Status: DISCONTINUED | OUTPATIENT
Start: 2023-08-01 | End: 2023-08-01 | Stop reason: HOSPADM

## 2023-08-01 RX ORDER — LIDOCAINE HYDROCHLORIDE 20 MG/ML
INJECTION, SOLUTION INFILTRATION; PERINEURAL PRN
Status: DISCONTINUED | OUTPATIENT
Start: 2023-08-01 | End: 2023-08-01 | Stop reason: SDUPTHER

## 2023-08-01 RX ORDER — SODIUM CHLORIDE 0.9 % (FLUSH) 0.9 %
5-40 SYRINGE (ML) INJECTION EVERY 12 HOURS SCHEDULED
Status: CANCELLED | OUTPATIENT
Start: 2023-08-01

## 2023-08-01 RX ORDER — PROPOFOL 10 MG/ML
INJECTION, EMULSION INTRAVENOUS PRN
Status: DISCONTINUED | OUTPATIENT
Start: 2023-08-01 | End: 2023-08-01 | Stop reason: SDUPTHER

## 2023-08-01 RX ORDER — SODIUM CHLORIDE 0.9 % (FLUSH) 0.9 %
5-40 SYRINGE (ML) INJECTION PRN
Status: CANCELLED | OUTPATIENT
Start: 2023-08-01

## 2023-08-01 RX ORDER — SODIUM CHLORIDE 9 MG/ML
INJECTION, SOLUTION INTRAVENOUS PRN
Status: CANCELLED | OUTPATIENT
Start: 2023-08-01

## 2023-08-01 RX ADMIN — LIDOCAINE HYDROCHLORIDE 5 ML: 20 INJECTION, SOLUTION INFILTRATION; PERINEURAL at 13:29

## 2023-08-01 RX ADMIN — PROPOFOL 20 MG: 10 INJECTION, EMULSION INTRAVENOUS at 13:37

## 2023-08-01 RX ADMIN — SODIUM CHLORIDE, POTASSIUM CHLORIDE, SODIUM LACTATE AND CALCIUM CHLORIDE: 600; 310; 30; 20 INJECTION, SOLUTION INTRAVENOUS at 12:34

## 2023-08-01 RX ADMIN — PROPOFOL 50 MG: 10 INJECTION, EMULSION INTRAVENOUS at 13:29

## 2023-08-01 RX ADMIN — LIDOCAINE HYDROCHLORIDE 5 ML: 20 INJECTION, SOLUTION INFILTRATION; PERINEURAL at 13:27

## 2023-08-01 RX ADMIN — PROPOFOL 50 MG: 10 INJECTION, EMULSION INTRAVENOUS at 13:27

## 2023-08-01 RX ADMIN — PROPOFOL 50 MG: 10 INJECTION, EMULSION INTRAVENOUS at 13:34

## 2023-08-01 RX ADMIN — PROPOFOL 50 MG: 10 INJECTION, EMULSION INTRAVENOUS at 13:32

## 2023-08-01 ASSESSMENT — LIFESTYLE VARIABLES: SMOKING_STATUS: 0

## 2023-08-01 ASSESSMENT — PAIN - FUNCTIONAL ASSESSMENT: PAIN_FUNCTIONAL_ASSESSMENT: 0-10

## 2023-08-01 NOTE — ANESTHESIA PRE PROCEDURE
Department of Anesthesiology  Preprocedure Note       Name:  Breezy Davis   Age:  58 y.o.  :  1960                                          MRN:  [id number]         Date:  2023      Surgeon: Lincoln Mcardle):  Richy Hope MD    Procedure: Procedure(s):  EGD ESOPHAGOGASTRODUODENOSCOPY-WITH BIOPSIES    Medications prior to admission:   Prior to Admission medications    Medication Sig Start Date End Date Taking? Authorizing Provider   rosuvastatin (CRESTOR) 40 MG tablet Take 1 tablet by mouth daily 23   Vonnie Butt MD   pramipexole (MIRAPEX) 1.5 MG tablet Take 1 tablet by mouth nightly 23   Vonnie Butt MD   lisinopril (PRINIVIL;ZESTRIL) 20 MG tablet Take 1 tablet by mouth daily 23   Vonnie Butt MD   omeprazole (PRILOSEC) 20 MG delayed release capsule Take 1 capsule by mouth daily 23   Vonnie Butt MD   DULoxetine (CYMBALTA) 60 MG extended release capsule Take 1 capsule by mouth daily 23   Vonnie Butt MD   fenofibrate (TRICOR) 48 MG tablet Take 1 tablet by mouth daily 23   Vonnie Butt MD   metoprolol succinate (TOPROL XL) 50 MG extended release tablet Take 0.5 tablets by mouth in the morning and 0.5 tablets in the evening. 23   Vonnie Butt MD   furosemide (LASIX) 40 MG tablet Take 1 tablet by mouth daily 23   Vonnie Butt MD   ELDERBERRY PO Take by mouth    Historical Provider, MD   Omega-3 Fatty Acids (FISH OIL ADULT GUMMIES PO) Take by mouth    Historical Provider, MD   celecoxib (CELEBREX) 200 MG capsule Take 1 capsule by mouth 2 times daily 23   Vonnie Butt MD   pregabalin (LYRICA) 75 MG capsule Take 1 capsule by mouth 2 times daily for 30 days.  Max Daily Amount: 150 mg 23  Vonnie Butt MD   GARLIC PO Take 1 tablet by mouth nightly    Historical Provider, MD   Multiple Minerals-Vitamins (DAVID-MAG-ZINC-D PO) Take by mouth daily Calcium, Magnesium, and Zinc combo daily    Historical Provider, MD   ASPIRIN 81 PO Take by mouth daily

## 2023-08-01 NOTE — ANESTHESIA POSTPROCEDURE EVALUATION
Department of Anesthesiology  Postprocedure Note    Patient: Dimas French  MRN: [id number]  YOB: 1960  Date of evaluation: 8/1/2023      Procedure Summary     Date: 08/01/23 Room / Location: [address] [place] [address]    Anesthesia Start: 1326 Anesthesia Stop: 3442    Procedure: EGD BIOPSY Diagnosis:       Gastroesophageal reflux disease, unspecified whether esophagitis present      (Gastroesophageal reflux disease, unspecified whether esophagitis present [K21.9])    Surgeons: Serena Schroeder MD Responsible Provider: ANNMARIE Austin CRNA    Anesthesia Type: general, TIVA ASA Status: 3          Anesthesia Type: No value filed.     Sharmin Phase I: Sharmin Score: 10    Sharmin Phase II: Sharmin Score: 10      Anesthesia Post Evaluation    Patient location during evaluation: PACU  Patient participation: complete - patient participated  Level of consciousness: awake and alert  Airway patency: patent  Nausea & Vomiting: no nausea and no vomiting  Complications: no  Cardiovascular status: blood pressure returned to baseline and hemodynamically stable  Respiratory status: acceptable and room air  Hydration status: euvolemic  Pain management: adequate

## 2023-08-01 NOTE — PROGRESS NOTES
Pt verbalized readiness to go home. Discharge instructions given to pt and son-in-law. Verbalized understanding and all questions answered at this time. Discharge Criteria    Inpatients must meet Criteria 1 through 7. All other patients are either YES or N/A. If a NO is chosen then Anesthesia or Surgeon must be notified. 1.  Minimum 30 minutes after last dose of sedative medication. Yes      2. Systolic BP between 90 - 547. Diastolic BP between 60 - 90. Yes      3. Pulse between 60 - 120    Yes      4. Respirations between 8 - 25. Yes      5. SpO2 92% - 100%. Yes      6. Able to cough and swallow or return to baseline function. Yes      7. Alert and oriented or return to baseline mental status. Yes      8. Demonstrates controlled, coordinated movements, ambulates with steady gait, or return to baseline activity function. Yes      9. Minimal or no pain or nausea, or at a level tolerable and acceptable to patient. Yes      10. Takes and retains oral fluids as allowed. Yes      11. Procedural / perioperative site stable. Minimal or no bleeding. Yes          12. If GI endoscopy procedure, minimal or no abdominal distention or passing flatus. Yes      13. Written discharge instructions and emergency telephone number provided. Yes      14. Accompanied by a responsible adult.     Yes

## 2023-08-01 NOTE — H&P
History and Physical    Patient's Name/Date of Birth: Hallie Robledo / 1960 (58 y.o.)    MRN: [id number]     Date: August 1, 2023       CHIEF COMPLAINT:  screening for colon cancer      HPI Hallie Robledo is a 58 y.o. old male who has a past medical history of tonsillar cancer, arthritis, coronary artery disease s/p CABG 2011, hypertension, gout, hyperlipidemia, hypertriglyceridemia, presenting for concern for worsening GERD. According to Nuria Mccauley, he had open heart surgery March 20, 2011; since the surgery has struggle with GERD. He reports that water will cause symptoms. Given his right symptoms as acid reflux, a frequent cough, throat irritation. He is currently treated with omeprazole. Of note, he is also taking Celebrex for cervical radiculopathy. 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 According to Nuria Mccauley, he had the same type of a cancer that is found in cervical cancer. He shares that he was also given a \"feeding tube\" in his stomach as he was unable to eat during his treatments. Colonoscopy September 21, 2021:  Findings: The entire examined colon appeared normal.  Estimated Blood Loss: Estimated blood loss: none. Impression:   - The entire examined colon is normal.  - No specimens collected. Recommendation:   - Discharge patient to home. Family history of colon cancer: Yes: Father and Brother  Blood in stool: No  Unintentional weight loss: No  Abdominal pain: No  Prior colonoscopy: Yes: last in 2021  Constipation history: No  Number of bowel movements a day: 1  Change in stool caliber: No  History of GERD or Acid Reflux: Yes  Use of PPI's.  Yes       Past Medical History:   Diagnosis Date    Arthritis     CAD (coronary artery disease)     Cancer ([facility]) 01/02/2013    Tonsil    Carpal tunnel syndrome 02/09/2018    Diabetes mellitus ([facility]) 04/20/2022

## 2023-08-01 NOTE — OP NOTE
showed no ulcers or lesions from bulb to sweep to 3rd portion. Biopsy of the mid esophagus at the salmon colored mucosal patch, gastric antrum, duodenal bulb were obtained with cold biopsy forceps.      IMPRESSION:  Gastritis      RECOMMENDATIONS:   1) Await pathology         Electronically signed by Davian Reyna MD on 8/1/2023 at 1:51 PM

## 2023-08-01 NOTE — DISCHARGE INSTRUCTIONS
SAME DAY SURGERY DISCHARGE INSTRUCTIONS    1. Do not drive or operate hazardous machinery for 24 hours. 2.  Do not make important personal or business decisions for 24 hours. 3.  Do not drink alcoholic beverages for 24 hours. 4.  Do not smoke tobacco products for 24 hours. 5.  Eat light foods (Jell-O, soups, etc....) and drink plenty of fluids (water, Sprite, etc...) up to 8 glasses per day, as you can tolerate. 6.  Limit your activities for 24 hours. Do not engage in heavy work until your surgeon gives you permission. 7.  Call your surgeon for any questions regarding your surgery. 8.  Patient should not be left alone for 12-24 hours following surgical procedure. ENDOSCOPY DISCHARGE INSTRUCTIONS:    You may have a mild sore throat; this should get better over the next day or two. Sipping warm liquids, a salt-water gargle or throat lozenges may be used. You may have some belching or a feeling of fullness in your abdomen. This is from air that was put into your stomach during the procedure. This should pass in a few hours. May resume your regular diet. You will receive a phone call with your test results in 2 weeks. CALL THE DOCTOR IF YOU HAVE:    Chest pain or trouble breathing. A hoarse voice or trouble swallowing    Bleeding, vomiting or spitting up of blood that is more than a few streaks or red or black stools    A fever above 101F or if you have chills    Pain that is worse or different than any pain you had before the procedure    Nausea or vomiting that lasts for more than 2 hours. If symptoms are to severe call 911 or go to the nearest Emergency Room.

## 2023-08-03 LAB — SURGICAL PATHOLOGY REPORT: NORMAL

## 2023-08-22 ENCOUNTER — TELEPHONE (OUTPATIENT)
Dept: GASTROENTEROLOGY | Age: 63
End: 2023-08-22

## 2023-08-22 NOTE — TELEPHONE ENCOUNTER
----- Message from Davian Reyna MD sent at 8/20/2023  8:14 PM EDT -----  Please notify patient: No Celiac disease, no H.pylori infection noted in tissue. Biopsies of duodenum is suggestive of inflammation. Please remind patient to submit stool for H.pylori testing - the order was already placed. Repeat EGD in 2 years.

## 2023-08-28 ENCOUNTER — OFFICE VISIT (OUTPATIENT)
Dept: PRIMARY CARE CLINIC | Age: 63
End: 2023-08-28
Payer: COMMERCIAL

## 2023-08-28 VITALS
WEIGHT: 253 LBS | SYSTOLIC BLOOD PRESSURE: 128 MMHG | DIASTOLIC BLOOD PRESSURE: 86 MMHG | BODY MASS INDEX: 37.47 KG/M2 | HEIGHT: 69 IN

## 2023-08-28 DIAGNOSIS — E78.1 HYPERTRIGLYCERIDEMIA: ICD-10-CM

## 2023-08-28 DIAGNOSIS — M62.838 MUSCLE SPASM: ICD-10-CM

## 2023-08-28 DIAGNOSIS — M54.12 CERVICAL RADICULOPATHY: ICD-10-CM

## 2023-08-28 DIAGNOSIS — E11.69 TYPE 2 DIABETES MELLITUS WITH OTHER SPECIFIED COMPLICATION, WITHOUT LONG-TERM CURRENT USE OF INSULIN (HCC): ICD-10-CM

## 2023-08-28 DIAGNOSIS — G62.9 NEUROPATHY: ICD-10-CM

## 2023-08-28 DIAGNOSIS — I10 PRIMARY HYPERTENSION: Primary | ICD-10-CM

## 2023-08-28 PROCEDURE — 1036F TOBACCO NON-USER: CPT | Performed by: STUDENT IN AN ORGANIZED HEALTH CARE EDUCATION/TRAINING PROGRAM

## 2023-08-28 PROCEDURE — 99214 OFFICE O/P EST MOD 30 MIN: CPT | Performed by: STUDENT IN AN ORGANIZED HEALTH CARE EDUCATION/TRAINING PROGRAM

## 2023-08-28 PROCEDURE — 2022F DILAT RTA XM EVC RTNOPTHY: CPT | Performed by: STUDENT IN AN ORGANIZED HEALTH CARE EDUCATION/TRAINING PROGRAM

## 2023-08-28 PROCEDURE — G8417 CALC BMI ABV UP PARAM F/U: HCPCS | Performed by: STUDENT IN AN ORGANIZED HEALTH CARE EDUCATION/TRAINING PROGRAM

## 2023-08-28 PROCEDURE — G8427 DOCREV CUR MEDS BY ELIG CLIN: HCPCS | Performed by: STUDENT IN AN ORGANIZED HEALTH CARE EDUCATION/TRAINING PROGRAM

## 2023-08-28 PROCEDURE — 3079F DIAST BP 80-89 MM HG: CPT | Performed by: STUDENT IN AN ORGANIZED HEALTH CARE EDUCATION/TRAINING PROGRAM

## 2023-08-28 PROCEDURE — 3017F COLORECTAL CA SCREEN DOC REV: CPT | Performed by: STUDENT IN AN ORGANIZED HEALTH CARE EDUCATION/TRAINING PROGRAM

## 2023-08-28 PROCEDURE — 3074F SYST BP LT 130 MM HG: CPT | Performed by: STUDENT IN AN ORGANIZED HEALTH CARE EDUCATION/TRAINING PROGRAM

## 2023-08-28 PROCEDURE — 3052F HG A1C>EQUAL 8.0%<EQUAL 9.0%: CPT | Performed by: STUDENT IN AN ORGANIZED HEALTH CARE EDUCATION/TRAINING PROGRAM

## 2023-08-28 RX ORDER — PREGABALIN 75 MG/1
75 CAPSULE ORAL 2 TIMES DAILY
Qty: 60 CAPSULE | Refills: 1 | Status: SHIPPED | OUTPATIENT
Start: 2023-08-28 | End: 2023-09-27

## 2023-08-28 RX ORDER — BACLOFEN 10 MG/1
10 TABLET ORAL 3 TIMES DAILY
Qty: 42 TABLET | Refills: 0 | Status: SHIPPED | OUTPATIENT
Start: 2023-08-28 | End: 2023-09-11

## 2023-09-01 ENCOUNTER — HOSPITAL ENCOUNTER (OUTPATIENT)
Age: 63
Discharge: HOME | End: 2023-09-01
Payer: COMMERCIAL

## 2023-09-01 DIAGNOSIS — E78.1 HYPERTRIGLYCERIDEMIA: ICD-10-CM

## 2023-09-01 DIAGNOSIS — E11.69 TYPE 2 DIABETES MELLITUS WITH OTHER SPECIFIED COMPLICATION, WITHOUT LONG-TERM CURRENT USE OF INSULIN (HCC): ICD-10-CM

## 2023-09-01 DIAGNOSIS — Z01.818 PRE-PROCEDURAL EXAMINATION: ICD-10-CM

## 2023-09-01 LAB
ALBUMIN SERPL-MCNC: 4.3 G/DL (ref 3.5–5.2)
ALBUMIN/GLOB SERPL: 1.7 {RATIO} (ref 1–2.5)
ALP SERPL-CCNC: 75 U/L (ref 40–129)
ALT SERPL-CCNC: 34 U/L (ref 5–41)
ANION GAP SERPL CALCULATED.3IONS-SCNC: 9 MMOL/L (ref 9–17)
AST SERPL-CCNC: 30 U/L
BASOPHILS # BLD: <0.03 K/UL (ref 0–0.2)
BASOPHILS NFR BLD: 0 % (ref 0–2)
BILIRUB SERPL-MCNC: 0.3 MG/DL (ref 0.3–1.2)
BUN SERPL-MCNC: 32 MG/DL (ref 8–23)
BUN/CREAT SERPL: 36 (ref 9–20)
CALCIUM SERPL-MCNC: 9.2 MG/DL (ref 8.6–10.4)
CHLORIDE SERPL-SCNC: 107 MMOL/L (ref 98–107)
CHOLEST SERPL-MCNC: 109 MG/DL
CHOLESTEROL/HDL RATIO: 3.5
CO2 SERPL-SCNC: 24 MMOL/L (ref 20–31)
CREAT SERPL-MCNC: 0.9 MG/DL (ref 0.7–1.2)
EOSINOPHIL # BLD: 0.14 K/UL (ref 0–0.44)
EOSINOPHILS RELATIVE PERCENT: 3 % (ref 1–4)
ERYTHROCYTE [DISTWIDTH] IN BLOOD BY AUTOMATED COUNT: 13.9 % (ref 11.8–14.4)
EST. AVERAGE GLUCOSE BLD GHB EST-MCNC: 166 MG/DL
GFR SERPL CREATININE-BSD FRML MDRD: >60 ML/MIN/1.73M2
GLUCOSE SERPL-MCNC: 155 MG/DL (ref 70–99)
HBA1C MFR BLD: 7.4 % (ref 4–6)
HCT VFR BLD AUTO: 36.5 % (ref 40.7–50.3)
HDLC SERPL-MCNC: 31 MG/DL
HGB BLD-MCNC: 11.5 G/DL (ref 13–17)
IMM GRANULOCYTES # BLD AUTO: <0.03 K/UL (ref 0–0.3)
IMM GRANULOCYTES NFR BLD: 0 %
LDLC SERPL CALC-MCNC: 45 MG/DL (ref 0–130)
LYMPHOCYTES NFR BLD: 0.78 K/UL (ref 1.1–3.7)
LYMPHOCYTES RELATIVE PERCENT: 15 % (ref 24–43)
MCH RBC QN AUTO: 29.3 PG (ref 25.2–33.5)
MCHC RBC AUTO-ENTMCNC: 31.5 G/DL (ref 28.4–34.8)
MCV RBC AUTO: 92.9 FL (ref 82.6–102.9)
MONOCYTES NFR BLD: 0.41 K/UL (ref 0.1–1.2)
MONOCYTES NFR BLD: 8 % (ref 3–12)
NEUTROPHILS NFR BLD: 74 % (ref 36–65)
NEUTS SEG NFR BLD: 3.69 K/UL (ref 1.5–8.1)
NRBC BLD-RTO: 0 PER 100 WBC
PLATELET # BLD AUTO: 205 K/UL (ref 138–453)
PMV BLD AUTO: 9.5 FL (ref 8.1–13.5)
POTASSIUM SERPL-SCNC: 4.4 MMOL/L (ref 3.7–5.3)
PROT SERPL-MCNC: 6.8 G/DL (ref 6.4–8.3)
RBC # BLD AUTO: 3.93 M/UL (ref 4.21–5.77)
SODIUM SERPL-SCNC: 140 MMOL/L (ref 135–144)
TRIGL SERPL-MCNC: 163 MG/DL
WBC OTHER # BLD: 5.1 K/UL (ref 3.5–11.3)

## 2023-09-01 PROCEDURE — 80053 COMPREHEN METABOLIC PANEL: CPT

## 2023-09-01 PROCEDURE — 36415 COLL VENOUS BLD VENIPUNCTURE: CPT

## 2023-09-01 PROCEDURE — 83036 HEMOGLOBIN GLYCOSYLATED A1C: CPT

## 2023-09-01 PROCEDURE — 85025 COMPLETE CBC W/AUTO DIFF WBC: CPT

## 2023-09-01 PROCEDURE — 80061 LIPID PANEL: CPT

## 2023-09-05 ENCOUNTER — TELEPHONE (OUTPATIENT)
Dept: PRIMARY CARE CLINIC | Age: 63
End: 2023-09-05

## 2023-09-05 DIAGNOSIS — D64.9 ANEMIA, UNSPECIFIED TYPE: Primary | ICD-10-CM

## 2023-09-21 ENCOUNTER — OFFICE VISIT (OUTPATIENT)
Age: 63
End: 2023-09-21
Payer: COMMERCIAL

## 2023-09-21 VITALS
WEIGHT: 253 LBS | RESPIRATION RATE: 19 BRPM | HEART RATE: 75 BPM | OXYGEN SATURATION: 95 % | BODY MASS INDEX: 37.36 KG/M2

## 2023-09-21 DIAGNOSIS — M96.1 LUMBAR POST-LAMINECTOMY SYNDROME: ICD-10-CM

## 2023-09-21 DIAGNOSIS — M47.817 LUMBOSACRAL SPONDYLOSIS WITHOUT MYELOPATHY: ICD-10-CM

## 2023-09-21 DIAGNOSIS — M47.812 CERVICAL SPONDYLOSIS: ICD-10-CM

## 2023-09-21 DIAGNOSIS — M50.30 DEGENERATIVE DISC DISEASE, CERVICAL: ICD-10-CM

## 2023-09-21 DIAGNOSIS — M51.36 LUMBAR DEGENERATIVE DISC DISEASE: ICD-10-CM

## 2023-09-21 DIAGNOSIS — M96.1 CERVICAL POST-LAMINECTOMY SYNDROME: ICD-10-CM

## 2023-09-21 DIAGNOSIS — M54.12 CERVICAL RADICULOPATHY: Primary | ICD-10-CM

## 2023-09-21 PROCEDURE — G8417 CALC BMI ABV UP PARAM F/U: HCPCS | Performed by: STUDENT IN AN ORGANIZED HEALTH CARE EDUCATION/TRAINING PROGRAM

## 2023-09-21 PROCEDURE — 99204 OFFICE O/P NEW MOD 45 MIN: CPT | Performed by: STUDENT IN AN ORGANIZED HEALTH CARE EDUCATION/TRAINING PROGRAM

## 2023-09-21 PROCEDURE — 3017F COLORECTAL CA SCREEN DOC REV: CPT | Performed by: STUDENT IN AN ORGANIZED HEALTH CARE EDUCATION/TRAINING PROGRAM

## 2023-09-21 PROCEDURE — G8427 DOCREV CUR MEDS BY ELIG CLIN: HCPCS | Performed by: STUDENT IN AN ORGANIZED HEALTH CARE EDUCATION/TRAINING PROGRAM

## 2023-09-21 PROCEDURE — 1036F TOBACCO NON-USER: CPT | Performed by: STUDENT IN AN ORGANIZED HEALTH CARE EDUCATION/TRAINING PROGRAM

## 2023-09-21 NOTE — PATIENT INSTRUCTIONS
Survey: You may be receiving a survey from [employer] regarding your visit today. Please complete the survey to enable us to provide the highest quality of care to you and your family. If you cannot score us a very good on any question, please call the office to discuss how we could have made your experience a very good one. Thank you.     [facility]  DO AUSTIN Hannon, [address]

## 2023-09-21 NOTE — PROGRESS NOTES
Chronic Pain Clinic Note     Encounter Date: 9/21/2023     SUBJECTIVE:  Chief Complaint   Patient presents with    Neck Pain    Back Pain    New Patient       History of Present Illness:   Breezy Davis is a 58 y.o. male who presents with cervical pain, lumbar back pain. The patient states that he has followed with pain mgmt before- Par[facility] Boogie. He c/o cervical pain that radiates BUE, right is worse. He also cannot flex his neck, he can extend. For example when he brushes his teeth- he can gargle but then cannot spit. Patient c/o lumbar back pain. H/o surgery. Cage at L4/5. Patient had a recent fall off a ladder working on his tool shed. Fell 9 ft    Medication Refill: n/a     Current Complaints of Pain:   Location: cervical, lumbar    Radiation: BUE, right elbow   Severity: severe   Pain Numerical Score - 8, intermittently    Average: 6     Highest: 9-10  Lowest: 6  Character/Quality: Complains of pain that is sharp, stabbing, ache  Timing: Wakes from sleep, keeps him up at night  Associated symptoms: paresthesias- BUE  Numbness: BUE  Weakness:   Exacerbating factors: walking  Alleviating factors:   Length of time pain has been present: Started on - chronic  Inciting event/injury:   Bowel/Bladder incontinence: yes  Falls: yes- recent fall off ladder at 9 ft  Physical Therapy: yes     History of Interventions:   Surgery: h/o lumbar surgery. Cage at L4/5. H/o cervical surgery fusion C4/5. Injections: Yes, [facility].      Imaging:    CT Lumbar 9/28/22, XR Knee 9/28/22    Past Medical History:   Diagnosis Date    Arthritis     CAD (coronary artery disease)     Cancer ([address]) 01/02/2013    Tonsil    Carpal tunnel syndrome 02/09/2018    Chronic back pain 01/01/1985    Depression 05/20/1988    Diabetes mellitus ([address]) 04/20/2022    Gastroesophageal reflux disease 04/20/2022    Hearing loss 05/01/2023    Hyperlipidemia     Hypertension     Movement disorder     Obesity 01/2014    Restless legs syndrome

## 2023-09-27 ENCOUNTER — OFFICE VISIT (OUTPATIENT)
Dept: ONCOLOGY | Age: 63
End: 2023-09-27
Payer: COMMERCIAL

## 2023-09-27 VITALS
SYSTOLIC BLOOD PRESSURE: 139 MMHG | WEIGHT: 247 LBS | HEART RATE: 81 BPM | HEIGHT: 69 IN | BODY MASS INDEX: 36.58 KG/M2 | DIASTOLIC BLOOD PRESSURE: 65 MMHG | TEMPERATURE: 97.6 F | RESPIRATION RATE: 18 BRPM

## 2023-09-27 DIAGNOSIS — C32.9 LARYNGEAL CANCER (HCC): ICD-10-CM

## 2023-09-27 DIAGNOSIS — D64.9 NORMOCYTIC ANEMIA: Primary | ICD-10-CM

## 2023-09-27 PROCEDURE — G8427 DOCREV CUR MEDS BY ELIG CLIN: HCPCS | Performed by: INTERNAL MEDICINE

## 2023-09-27 PROCEDURE — G8417 CALC BMI ABV UP PARAM F/U: HCPCS | Performed by: INTERNAL MEDICINE

## 2023-09-27 PROCEDURE — 3075F SYST BP GE 130 - 139MM HG: CPT | Performed by: INTERNAL MEDICINE

## 2023-09-27 PROCEDURE — 3078F DIAST BP <80 MM HG: CPT | Performed by: INTERNAL MEDICINE

## 2023-09-27 PROCEDURE — 99205 OFFICE O/P NEW HI 60 MIN: CPT | Performed by: INTERNAL MEDICINE

## 2023-09-27 NOTE — PROGRESS NOTES
anemia  History of laryngeal cancer in 2013 status post chemoradiation. In remission. Ex-smoker. PLAN: Records, labs and images were reviewed and discussed with the patient. I explained to the patient the nature of this problem with anemia and possible underlying cause and management plan. He is having normocytic anemia. No active bleeding. Recent EGD was negative. Old colonoscopy was negative. He had normal hemoglobin level few months back. So at this point we need to investigate this further. I will check iron studies along with vitamin B12 and folate and reticulocyte count. We will check serum protein immunofixation. We will correct any abnormal findings. Patient laryngeal cancer is in remission. Patient was reassured. Patient's questions were answered to the best of his satisfaction and he verbalized full understanding and agreement. [address] [facility]                            This note is created with the assistance of a speech recognition program.  While intending to generate a document that actually reflects the content of the visit, the document can still have some errors including those of syntax and sound a like substitutions which may escape proof reading. It such instances, actual meaning can be extrapolated by contextual diversion.

## 2023-09-29 ENCOUNTER — HOSPITAL ENCOUNTER (OUTPATIENT)
Dept: MRI IMAGING | Facility: CLINIC | Age: 63
End: 2023-09-29
Payer: COMMERCIAL

## 2023-09-29 ENCOUNTER — HOSPITAL ENCOUNTER (OUTPATIENT)
Dept: MRI IMAGING | Facility: CLINIC | Age: 63
Discharge: HOME | End: 2023-09-29
Payer: COMMERCIAL

## 2023-09-29 ENCOUNTER — HOSPITAL ENCOUNTER (OUTPATIENT)
Age: 63
Setting detail: SPECIMEN
Discharge: HOME | End: 2023-09-29

## 2023-09-29 DIAGNOSIS — D64.9 NORMOCYTIC ANEMIA: ICD-10-CM

## 2023-09-29 DIAGNOSIS — M54.12 CERVICAL RADICULOPATHY: ICD-10-CM

## 2023-09-29 DIAGNOSIS — M47.817 LUMBOSACRAL SPONDYLOSIS WITHOUT MYELOPATHY: ICD-10-CM

## 2023-09-29 LAB
BASOPHILS # BLD: 0 K/UL (ref 0–0.2)
BASOPHILS NFR BLD: 0 % (ref 0–2)
EOSINOPHIL # BLD: 0.07 K/UL (ref 0–0.4)
EOSINOPHILS RELATIVE PERCENT: 2 % (ref 1–4)
ERYTHROCYTE [DISTWIDTH] IN BLOOD BY AUTOMATED COUNT: 14.3 % (ref 11.8–14.4)
HCT VFR BLD AUTO: 37.9 % (ref 40.7–50.3)
HGB BLD-MCNC: 11.6 G/DL (ref 13–17)
IMM GRANULOCYTES # BLD AUTO: 0 K/UL (ref 0–0.3)
IMM GRANULOCYTES NFR BLD: 0 %
IMM RETICS NFR: 16.3 % (ref 2.7–18.3)
LYMPHOCYTES NFR BLD: 0.47 K/UL (ref 1–4.8)
LYMPHOCYTES RELATIVE PERCENT: 13 % (ref 24–44)
MCH RBC QN AUTO: 30.5 PG (ref 25.2–33.5)
MCHC RBC AUTO-ENTMCNC: 30.6 G/DL (ref 28.4–34.8)
MCV RBC AUTO: 99.7 FL (ref 82.6–102.9)
MONOCYTES NFR BLD: 0.32 K/UL (ref 0.1–0.8)
MONOCYTES NFR BLD: 9 % (ref 1–7)
MORPHOLOGY: NORMAL
NEUTROPHILS NFR BLD: 76 % (ref 36–66)
NEUTS SEG NFR BLD: 2.74 K/UL (ref 1.8–7.7)
NRBC BLD-RTO: 0 PER 100 WBC
PLATELET # BLD AUTO: 174 K/UL (ref 138–453)
PMV BLD AUTO: 9.9 FL (ref 8.1–13.5)
RBC # BLD AUTO: 3.8 M/UL (ref 4.21–5.77)
RETIC HEMOGLOBIN: 32.7 PG (ref 28.2–35.7)
RETICS # AUTO: 0.07 M/UL (ref 0.03–0.08)
RETICS/RBC NFR AUTO: 1.8 % (ref 0.5–1.9)
WBC OTHER # BLD: 3.6 K/UL (ref 3.5–11.3)

## 2023-09-29 PROCEDURE — 72141 MRI NECK SPINE W/O DYE: CPT

## 2023-09-29 PROCEDURE — 72148 MRI LUMBAR SPINE W/O DYE: CPT

## 2023-09-30 LAB
FOLATE SERPL-MCNC: >20 NG/ML
IRON SATN MFR SERPL: 23 % (ref 20–55)
IRON SERPL-MCNC: 77 UG/DL (ref 59–158)
TIBC SERPL-MCNC: 333 UG/DL (ref 250–450)
UNSATURATED IRON BINDING CAPACITY: 256 UG/DL (ref 112–347)
VIT B12 SERPL-MCNC: 380 PG/ML (ref 232–1245)

## 2023-10-03 LAB
INTERPRETATION SERPL IFE-IMP: NORMAL
PATH REV: NORMAL

## 2023-10-06 ENCOUNTER — PATIENT MESSAGE (OUTPATIENT)
Dept: PRIMARY CARE CLINIC | Age: 63
End: 2023-10-06

## 2023-10-11 ENCOUNTER — OFFICE VISIT (OUTPATIENT)
Age: 63
End: 2023-10-11
Payer: COMMERCIAL

## 2023-10-11 VITALS
DIASTOLIC BLOOD PRESSURE: 73 MMHG | SYSTOLIC BLOOD PRESSURE: 113 MMHG | HEART RATE: 80 BPM | WEIGHT: 244 LBS | OXYGEN SATURATION: 98 % | RESPIRATION RATE: 19 BRPM | BODY MASS INDEX: 36.03 KG/M2

## 2023-10-11 DIAGNOSIS — M47.817 LUMBOSACRAL SPONDYLOSIS WITHOUT MYELOPATHY: ICD-10-CM

## 2023-10-11 DIAGNOSIS — M50.30 DEGENERATIVE DISC DISEASE, CERVICAL: ICD-10-CM

## 2023-10-11 DIAGNOSIS — M96.1 CERVICAL POST-LAMINECTOMY SYNDROME: ICD-10-CM

## 2023-10-11 DIAGNOSIS — M47.812 CERVICAL SPONDYLOSIS: Primary | ICD-10-CM

## 2023-10-11 DIAGNOSIS — M51.36 LUMBAR DEGENERATIVE DISC DISEASE: ICD-10-CM

## 2023-10-11 DIAGNOSIS — M96.1 LUMBAR POST-LAMINECTOMY SYNDROME: ICD-10-CM

## 2023-10-11 DIAGNOSIS — M25.521 RIGHT ELBOW PAIN: ICD-10-CM

## 2023-10-11 PROCEDURE — 1036F TOBACCO NON-USER: CPT | Performed by: STUDENT IN AN ORGANIZED HEALTH CARE EDUCATION/TRAINING PROGRAM

## 2023-10-11 PROCEDURE — 3078F DIAST BP <80 MM HG: CPT | Performed by: STUDENT IN AN ORGANIZED HEALTH CARE EDUCATION/TRAINING PROGRAM

## 2023-10-11 PROCEDURE — G8427 DOCREV CUR MEDS BY ELIG CLIN: HCPCS | Performed by: STUDENT IN AN ORGANIZED HEALTH CARE EDUCATION/TRAINING PROGRAM

## 2023-10-11 PROCEDURE — 3017F COLORECTAL CA SCREEN DOC REV: CPT | Performed by: STUDENT IN AN ORGANIZED HEALTH CARE EDUCATION/TRAINING PROGRAM

## 2023-10-11 PROCEDURE — 99214 OFFICE O/P EST MOD 30 MIN: CPT | Performed by: STUDENT IN AN ORGANIZED HEALTH CARE EDUCATION/TRAINING PROGRAM

## 2023-10-11 PROCEDURE — G8417 CALC BMI ABV UP PARAM F/U: HCPCS | Performed by: STUDENT IN AN ORGANIZED HEALTH CARE EDUCATION/TRAINING PROGRAM

## 2023-10-11 PROCEDURE — 3074F SYST BP LT 130 MM HG: CPT | Performed by: STUDENT IN AN ORGANIZED HEALTH CARE EDUCATION/TRAINING PROGRAM

## 2023-10-11 PROCEDURE — G8484 FLU IMMUNIZE NO ADMIN: HCPCS | Performed by: STUDENT IN AN ORGANIZED HEALTH CARE EDUCATION/TRAINING PROGRAM

## 2023-10-11 NOTE — H&P (VIEW-ONLY)
patient has axial low back pain that is mechanical in nature. There is tenderness to palpation along the lumbar paraspinal musculature with positive lumbar facet loading bilaterally. The lumbar MRI on 10/5/2023 reveals multilevel degenerative changes with facet hypertrophy throughout the lumbar spine specifically at L3-4 and L5-S1 facet joints. Neurologically, it appears the patient has full strength and normal sensation. There is no evidence of myelopathy on examination. There are no red flags in the patient's history. The patient has failed conservative measures including outpatient physical therapy, greater than 3 medications for pain relief, a self-directed therapy program, as well as activity modification all within the last 6 weeks over 3 months. The patient's pain is moderate to severe that causes functional deficit measured on pain and disability scale. The patient reports worsening quality of life. PLAN:  Medications: For nonopioid therapy, the following medications were prescribed:    -Continue medication prescribed by primary care physician    Opioid therapy:  -Not indicated    Interventions:  -Plan for bilateral cervical C3, C4, C5 medial branch blocks  -Consider lumbar medial branch blocks in future  -Hold aspirin 7 days    Imaging:  -Reviewed cervical MRI with patient in room  -Reviewed lumbar MRI with patient in room    Behavioral Therapies:  -Continue daily stretching and home exercise program    Referrals:  -Orthopedic surgery for chronic right elbow pain    Follow-up Plan:  -After procedure    Patient was offered intervention where appropriate. Multi-modal Pain Therapy: The patient was explicitly considered for multimodal and interdisciplinary therapy. Non-opioid and non-pharmacological opportunities to enhance analgesia and quality of life have been and will continue to be pursued.     Magadlena Eugene, DO  Interventional Pain Management/PM&R   [address] [facility]

## 2023-10-11 NOTE — PATIENT INSTRUCTIONS
Survey: You may be receiving a survey from [employer] regarding your visit today. Please complete the survey to enable us to provide the highest quality of care to you and your family. If you cannot score us a very good on any question, please call the office to discuss how we could have made your experience a very good one. Thank you.     [address] [facility]  [facility], [address]

## 2023-10-11 NOTE — PROGRESS NOTES
Chronic Pain Clinic Note     Encounter Date: 10/11/2023     SUBJECTIVE:  Chief Complaint   Patient presents with    Neck Pain       History of Present Illness:   Sujata Gilliam is a 58 y.o. male who presents with cervical pain, lumbar back pain. The patient is here to review MRI's of his cervical and lumbar. He denies significant changes in his pain in the neck or back. Medication Refill: n/a     Current Complaints of Pain:   Location: Neck  Radiation: None  Severity: severe   Pain Numerical Score - 7  Average: 6     Highest: 9-10  Lowest: 6  Character/Quality: Complains of pain that is sharp, stabbing, ache  Timing: Wakes from sleep, keeps him up at night, pretty constant  Associated symptoms: paresthesias- BUE  Numbness: None  Weakness:   Exacerbating factors: walking  Alleviating factors:   Length of time pain has been present: Started on - chronic  Inciting event/injury: recent fall from ladder exacerbated symptoms  Bowel/Bladder incontinence: yes  Falls: yes- recent fall off ladder at 9 ft  Physical Therapy: yes     History of Interventions:   Surgery: h/o lumbar surgery. Cage at L4/5. H/o cervical surgery fusion C4/5. Injections: Yes, [facility]. Imaging:    MRI Lumbar 10/5/23    FINDINGS:  BONES/ALIGNMENT: The vertebral body heights are maintained. There is  age-appropriate bone marrow signal.  There is posterior fixation at L4-5. There is degenerative disc disease with loss of disc signal.  There is no  spondylolisthesis. SPINAL CORD: The conus terminates normally. SOFT TISSUES: No paraspinal mass identified. L1-L2: There is no significant disc herniation, spinal canal stenosis or  neural foraminal narrowing. L2-L3: There is a circumferential disc bulge with facet and ligamentous  hypertrophy. There is canal stenosis measuring 8 mm in AP dimension. There  is narrowing of the lateral recesses. There is mild right and moderate left  foraminal narrowing.      L3-L4: There

## 2023-10-12 DIAGNOSIS — D64.9 NORMOCYTIC ANEMIA: Primary | ICD-10-CM

## 2023-10-19 ENCOUNTER — APPOINTMENT (OUTPATIENT)
Dept: GENERAL RADIOLOGY | Age: 63
End: 2023-10-19
Attending: STUDENT IN AN ORGANIZED HEALTH CARE EDUCATION/TRAINING PROGRAM
Payer: COMMERCIAL

## 2023-10-19 ENCOUNTER — HOSPITAL ENCOUNTER (OUTPATIENT)
Age: 63
Setting detail: OUTPATIENT SURGERY
Discharge: HOME | End: 2023-10-19
Attending: STUDENT IN AN ORGANIZED HEALTH CARE EDUCATION/TRAINING PROGRAM | Admitting: STUDENT IN AN ORGANIZED HEALTH CARE EDUCATION/TRAINING PROGRAM
Payer: COMMERCIAL

## 2023-10-19 VITALS
OXYGEN SATURATION: 94 % | BODY MASS INDEX: 35.88 KG/M2 | DIASTOLIC BLOOD PRESSURE: 66 MMHG | HEART RATE: 64 BPM | WEIGHT: 243 LBS | RESPIRATION RATE: 16 BRPM | SYSTOLIC BLOOD PRESSURE: 129 MMHG | TEMPERATURE: 98.1 F

## 2023-10-19 PROCEDURE — 64491 INJ PARAVERT F JNT C/T 2 LEV: CPT | Performed by: STUDENT IN AN ORGANIZED HEALTH CARE EDUCATION/TRAINING PROGRAM

## 2023-10-19 PROCEDURE — 2500000003 HC RX 250 WO HCPCS: Performed by: STUDENT IN AN ORGANIZED HEALTH CARE EDUCATION/TRAINING PROGRAM

## 2023-10-19 PROCEDURE — 2709999900 HC NON-CHARGEABLE SUPPLY: Performed by: STUDENT IN AN ORGANIZED HEALTH CARE EDUCATION/TRAINING PROGRAM

## 2023-10-19 PROCEDURE — 64490 INJ PARAVERT F JNT C/T 1 LEV: CPT | Performed by: STUDENT IN AN ORGANIZED HEALTH CARE EDUCATION/TRAINING PROGRAM

## 2023-10-19 PROCEDURE — 3600000002 HC SURGERY LEVEL 2 BASE: Performed by: STUDENT IN AN ORGANIZED HEALTH CARE EDUCATION/TRAINING PROGRAM

## 2023-10-19 RX ORDER — LIDOCAINE HYDROCHLORIDE 20 MG/ML
INJECTION, SOLUTION EPIDURAL; INFILTRATION; INTRACAUDAL; PERINEURAL PRN
Status: DISCONTINUED | OUTPATIENT
Start: 2023-10-19 | End: 2023-10-19 | Stop reason: ALTCHOICE

## 2023-10-19 ASSESSMENT — PAIN SCALES - GENERAL
PAINLEVEL_OUTOF10: 5
PAINLEVEL_OUTOF10: 0

## 2023-10-19 ASSESSMENT — PAIN DESCRIPTION - ORIENTATION: ORIENTATION: LOWER;UPPER

## 2023-10-19 ASSESSMENT — PAIN DESCRIPTION - DESCRIPTORS: DESCRIPTORS: ACHING

## 2023-10-19 ASSESSMENT — PAIN DESCRIPTION - LOCATION: LOCATION: BACK

## 2023-10-19 NOTE — INTERVAL H&P NOTE
Update History & Physical    The patient's History and Physical of October 11, 2023 was reviewed with the patient and I examined the patient. There was no change. The surgical site was confirmed by the patient and me. Plan: The risks, benefits, expected outcome, and alternative to the recommended procedure have been discussed with the patient. Patient understands and wants to proceed with the procedure.      Electronically signed by Mike Hdez DO on 10/19/2023 at 10:33 AM

## 2023-10-19 NOTE — OP NOTE
PROCEDURE PERFORMED: Bilateral Cervical Medial Branch Block using Fluoroscopy    PREOPERATIVE DIAGNOSIS: Bilateral neck pain/cervical spondylosis    INDICATIONS: Chronic neck pain    The patient's history and physical exam were reviewed. The risk, benefits, and alternatives of the procedure were discussed and all questions were answered to the patient's satisfaction. The patient agreed to proceed and written informed consent was obtained. POSTOPERATIVE DIAGNOSIS: Same    PHYSICIAN:  Dr. Erik Gupta DO    ANESTHESIA:  LOCAL    ASSISTANT:  NONE    PATHOLOGY:  NONE    ESTIMATED BLOOD LOSS:  N/A    IMPLANTS:  NONE    PROCEDURE DESCRIPTION: Diagnostic bilateral cervical medial branch block using fluoroscopy    The patient was placed on the operative bed in prone position. The area was prepped with  Chlorhexidine. The area was then draped in a sterile fashion. Targeted levels: Bilateral cervical C3, C4, C5 medial branch block    An AP  fluoroscopic film was obtained to identify the levels. At each cervical medial branch, a 25-gauge 3-1/2inch needle was inserted under AP fluoroscopic projections until bone was contacted. Then the needle tip was advanced to contact the centroid of the lateral articular pillar at each respective level. Aspiration of each needle was negative for blood, CSF and paresthesia prior to injection. The nerve block was then performed by injecting 0.5 mL lidocaine 2% through each needle. The needles were then removed and the needle sites were dressed appropriately. The same procedure was performed on the opposite side. The patient did not experience any hemodynamic or neurologic sequelae. The patient was transferred to the postoperative care unit in stable condition. Written discharge instructions were given to the patient. A pain diary was given to the patient upon discharge. COMPLICATIONS:  There were no apparent complications.   The patient tolerated the procedure

## 2023-10-19 NOTE — PROGRESS NOTES
Pt pleasant, VS stable, pain level 0/10. Band aid clean, dry, and intact. Pt verbal understand discharging instructions and signed discharge paperwork. Pt ambulated out of department with steady gait.

## 2023-10-20 ENCOUNTER — TELEPHONE (OUTPATIENT)
Age: 63
End: 2023-10-20

## 2023-10-20 NOTE — TELEPHONE ENCOUNTER
Spoke to the patient, he states that he got relief and is still having relief. He states that he got a lot of relief. He states that he has better ROM. States mild tenderness. Pain is nothing like it was prior. Approx 80% better.

## 2023-10-25 ENCOUNTER — HOSPITAL ENCOUNTER (EMERGENCY)
Age: 63
Discharge: HOME | End: 2023-10-26
Attending: EMERGENCY MEDICINE
Payer: COMMERCIAL

## 2023-10-25 DIAGNOSIS — M19.021 OSTEOARTHRITIS INVOLVING JOINT OF RIGHT UPPER ARM: ICD-10-CM

## 2023-10-25 DIAGNOSIS — S46.009A ROTATOR CUFF INJURY, INITIAL ENCOUNTER: ICD-10-CM

## 2023-10-25 DIAGNOSIS — M25.521 PAIN IN JOINT OF RIGHT ELBOW: Primary | ICD-10-CM

## 2023-10-25 PROCEDURE — 99284 EMERGENCY DEPT VISIT MOD MDM: CPT

## 2023-10-25 PROCEDURE — 96372 THER/PROPH/DIAG INJ SC/IM: CPT

## 2023-10-25 ASSESSMENT — PAIN DESCRIPTION - ORIENTATION: ORIENTATION: RIGHT

## 2023-10-25 ASSESSMENT — PAIN DESCRIPTION - FREQUENCY: FREQUENCY: CONTINUOUS

## 2023-10-25 ASSESSMENT — PAIN SCALES - GENERAL: PAINLEVEL_OUTOF10: 7

## 2023-10-25 ASSESSMENT — PAIN DESCRIPTION - PAIN TYPE: TYPE: CHRONIC PAIN

## 2023-10-25 ASSESSMENT — PAIN DESCRIPTION - LOCATION: LOCATION: SHOULDER;ELBOW

## 2023-10-25 ASSESSMENT — PAIN - FUNCTIONAL ASSESSMENT: PAIN_FUNCTIONAL_ASSESSMENT: 0-10

## 2023-10-25 ASSESSMENT — PAIN DESCRIPTION - DESCRIPTORS: DESCRIPTORS: DISCOMFORT

## 2023-10-26 ENCOUNTER — APPOINTMENT (OUTPATIENT)
Dept: GENERAL RADIOLOGY | Age: 63
End: 2023-10-26
Payer: COMMERCIAL

## 2023-10-26 VITALS
WEIGHT: 243 LBS | SYSTOLIC BLOOD PRESSURE: 112 MMHG | HEIGHT: 69 IN | DIASTOLIC BLOOD PRESSURE: 57 MMHG | RESPIRATION RATE: 18 BRPM | BODY MASS INDEX: 35.99 KG/M2 | TEMPERATURE: 98.1 F | OXYGEN SATURATION: 92 % | HEART RATE: 64 BPM

## 2023-10-26 PROCEDURE — 6370000000 HC RX 637 (ALT 250 FOR IP): Performed by: EMERGENCY MEDICINE

## 2023-10-26 PROCEDURE — 96372 THER/PROPH/DIAG INJ SC/IM: CPT

## 2023-10-26 PROCEDURE — 6360000002 HC RX W HCPCS: Performed by: EMERGENCY MEDICINE

## 2023-10-26 PROCEDURE — 73080 X-RAY EXAM OF ELBOW: CPT

## 2023-10-26 RX ORDER — ONDANSETRON 4 MG/1
4 TABLET, ORALLY DISINTEGRATING ORAL ONCE
Status: COMPLETED | OUTPATIENT
Start: 2023-10-26 | End: 2023-10-26

## 2023-10-26 RX ORDER — OXYCODONE HYDROCHLORIDE AND ACETAMINOPHEN 5; 325 MG/1; MG/1
1 TABLET ORAL EVERY 6 HOURS PRN
Qty: 12 TABLET | Refills: 0 | Status: SHIPPED | OUTPATIENT
Start: 2023-10-26 | End: 2023-10-29

## 2023-10-26 RX ORDER — HYDROMORPHONE HYDROCHLORIDE 2 MG/ML
2 INJECTION, SOLUTION INTRAMUSCULAR; INTRAVENOUS; SUBCUTANEOUS ONCE
Status: COMPLETED | OUTPATIENT
Start: 2023-10-26 | End: 2023-10-26

## 2023-10-26 RX ORDER — PREDNISONE 20 MG/1
40 TABLET ORAL ONCE
Status: COMPLETED | OUTPATIENT
Start: 2023-10-26 | End: 2023-10-26

## 2023-10-26 RX ADMIN — HYDROMORPHONE HYDROCHLORIDE 2 MG: 2 INJECTION, SOLUTION INTRAMUSCULAR; INTRAVENOUS; SUBCUTANEOUS at 00:45

## 2023-10-26 RX ADMIN — ONDANSETRON 4 MG: 4 TABLET, ORALLY DISINTEGRATING ORAL at 00:45

## 2023-10-26 RX ADMIN — PREDNISONE 40 MG: 20 TABLET ORAL at 00:45

## 2023-10-26 ASSESSMENT — ENCOUNTER SYMPTOMS
SHORTNESS OF BREATH: 0
ABDOMINAL PAIN: 0
NAUSEA: 0
COLOR CHANGE: 0
COUGH: 0
BACK PAIN: 1
SORE THROAT: 0
VOMITING: 0

## 2023-10-26 ASSESSMENT — PAIN DESCRIPTION - LOCATION: LOCATION: ARM

## 2023-10-26 ASSESSMENT — PAIN SCALES - GENERAL: PAINLEVEL_OUTOF10: 7

## 2023-10-26 ASSESSMENT — PAIN DESCRIPTION - ORIENTATION: ORIENTATION: RIGHT

## 2023-10-26 NOTE — DISCHARGE INSTRUCTIONS
Please follow-up with your family doctor and/or orthopedic surgeon to discuss further testing and treatment options regarding your right elbow pain.   If you have worsening symptoms or any further concerns please return to the ER for repeat evaluation

## 2023-10-26 NOTE — ED PROVIDER NOTES
[facility] ED  eMERGENCY dEPARTMENT eNCOUnter      Pt Name: Shakira Howell. MRN: [id number]  Birthdate 1960  Date of evaluation: 10/25/2023  Provider: Alon Leon, [address] [place]       Chief Complaint   Patient presents with    Shoulder Pain     Patient presents to the emergency department for complaint of chronic right shoulder pain. Reports he has a known tear in the rotator cuff. Joint Swelling     Patient reports right elbow pain and swelling that has become severe. Unable to sleep d/t pain          HISTORY OF PRESENT ILLNESS   (Location/Symptom, Timing/Onset, Context/Setting, Quality, Duration, Modifying Factors, Severity) Note limiting factors. ANDI Reno Sr. is a 58 y.o. male who presents to the emergency department with complaint of right shoulder/elbow pain. Patient is a 65-year-old male with past medical history of right rotator cuff injury coronary artery disease arthritis depression and hypertension. He states that for the last few days he has felt like there is swelling to his right elbow with no known trauma. He states the area will occasionally \"crack\" and finds he has increased pain. He states he still able to work but that he had difficulty sleeping secondary to the persistent discomfort. He denies any history of DVT/PE. He denies any recent fevers or chills. He states over-the-counter medication is not helping and secondary to his comes in for evaluation    Nursing Notes were reviewed. REVIEW OF SYSTEMS    (2+ for level 4; 10+ for level 5)   Review of Systems   Constitutional:  Negative for chills and fever. HENT:  Negative for congestion and sore throat. Eyes:  Negative for visual disturbance. Respiratory:  Negative for cough and shortness of breath. Cardiovascular:  Negative for chest pain. Gastrointestinal:  Negative for abdominal pain, nausea and vomiting.    Musculoskeletal:  Positive for arthralgias, back pain and joint

## 2023-11-08 ENCOUNTER — HOSPITAL ENCOUNTER (OUTPATIENT)
Dept: NEUROLOGY | Age: 63
Discharge: HOME | End: 2023-11-08
Payer: COMMERCIAL

## 2023-11-08 PROCEDURE — 95909 NRV CNDJ TST 5-6 STUDIES: CPT | Performed by: PHYSICAL MEDICINE & REHABILITATION

## 2023-11-08 PROCEDURE — 95886 MUSC TEST DONE W/N TEST COMP: CPT | Performed by: PHYSICAL MEDICINE & REHABILITATION

## 2023-11-10 ENCOUNTER — OFFICE VISIT (OUTPATIENT)
Dept: PRIMARY CARE CLINIC | Age: 63
End: 2023-11-10
Payer: COMMERCIAL

## 2023-11-10 VITALS
SYSTOLIC BLOOD PRESSURE: 138 MMHG | HEART RATE: 74 BPM | OXYGEN SATURATION: 94 % | DIASTOLIC BLOOD PRESSURE: 82 MMHG | HEIGHT: 69 IN | BODY MASS INDEX: 36.58 KG/M2 | WEIGHT: 247 LBS

## 2023-11-10 DIAGNOSIS — E78.1 HYPERTRIGLYCERIDEMIA: ICD-10-CM

## 2023-11-10 DIAGNOSIS — G89.29 CHRONIC RIGHT SHOULDER PAIN: Primary | ICD-10-CM

## 2023-11-10 DIAGNOSIS — I10 PRIMARY HYPERTENSION: ICD-10-CM

## 2023-11-10 DIAGNOSIS — M25.511 CHRONIC RIGHT SHOULDER PAIN: Primary | ICD-10-CM

## 2023-11-10 DIAGNOSIS — E11.69 TYPE 2 DIABETES MELLITUS WITH OTHER SPECIFIED COMPLICATION, WITHOUT LONG-TERM CURRENT USE OF INSULIN (HCC): ICD-10-CM

## 2023-11-10 PROCEDURE — 99214 OFFICE O/P EST MOD 30 MIN: CPT | Performed by: STUDENT IN AN ORGANIZED HEALTH CARE EDUCATION/TRAINING PROGRAM

## 2023-11-10 PROCEDURE — G8427 DOCREV CUR MEDS BY ELIG CLIN: HCPCS | Performed by: STUDENT IN AN ORGANIZED HEALTH CARE EDUCATION/TRAINING PROGRAM

## 2023-11-10 PROCEDURE — G8484 FLU IMMUNIZE NO ADMIN: HCPCS | Performed by: STUDENT IN AN ORGANIZED HEALTH CARE EDUCATION/TRAINING PROGRAM

## 2023-11-10 PROCEDURE — 2022F DILAT RTA XM EVC RTNOPTHY: CPT | Performed by: STUDENT IN AN ORGANIZED HEALTH CARE EDUCATION/TRAINING PROGRAM

## 2023-11-10 PROCEDURE — 3079F DIAST BP 80-89 MM HG: CPT | Performed by: STUDENT IN AN ORGANIZED HEALTH CARE EDUCATION/TRAINING PROGRAM

## 2023-11-10 PROCEDURE — 3051F HG A1C>EQUAL 7.0%<8.0%: CPT | Performed by: STUDENT IN AN ORGANIZED HEALTH CARE EDUCATION/TRAINING PROGRAM

## 2023-11-10 PROCEDURE — G8417 CALC BMI ABV UP PARAM F/U: HCPCS | Performed by: STUDENT IN AN ORGANIZED HEALTH CARE EDUCATION/TRAINING PROGRAM

## 2023-11-10 PROCEDURE — 3075F SYST BP GE 130 - 139MM HG: CPT | Performed by: STUDENT IN AN ORGANIZED HEALTH CARE EDUCATION/TRAINING PROGRAM

## 2023-11-10 PROCEDURE — 1036F TOBACCO NON-USER: CPT | Performed by: STUDENT IN AN ORGANIZED HEALTH CARE EDUCATION/TRAINING PROGRAM

## 2023-11-10 PROCEDURE — 3017F COLORECTAL CA SCREEN DOC REV: CPT | Performed by: STUDENT IN AN ORGANIZED HEALTH CARE EDUCATION/TRAINING PROGRAM

## 2023-11-10 NOTE — PROGRESS NOTES
09/01/2023    CREATININE 0.9 09/01/2023    BUN 32 (H) 09/01/2023    CO2 24 09/01/2023    TSH 3.01 04/19/2022    LABA1C 7.4 (H) 09/01/2023     Lab Results   Component Value Date    CALCIUM 9.2 09/01/2023     Lab Results   Component Value Date    LDLCHOLESTEROL 45 09/01/2023    LDLDIRECT 90 02/25/2023       Please note that this chart was generated using voice recognition Dragon dictation software. Although every effort was made to ensure the accuracy of this automated transcription, some errors in transcription may have occurred.     Electronically signed by Dr. Burt Wright MD on 11/10/2023 at 3:49 PM

## 2023-11-14 ENCOUNTER — TELEPHONE (OUTPATIENT)
Age: 63
End: 2023-11-14

## 2023-12-11 ENCOUNTER — TELEPHONE (OUTPATIENT)
Dept: PREADMISSION TESTING | Age: 63
End: 2023-12-11

## 2023-12-11 NOTE — TELEPHONE ENCOUNTER
Please review pt's health history. Significant cardiac history and co morbidities. Scheduled with Dr. Lydia Salmeron 12/21/23. Thank you.

## 2023-12-12 NOTE — TELEPHONE ENCOUNTER
History and testing reviewed. Pt EKG normal, echo normal, ef 55%. Pt history of smoking, quit 2008, CABG in 2008, well managed at this time medically. History of LARYNGEAL CA, chemoradiation 2013- EGD done here in august 2023 and pt has had surgery since 2013 with no documented difficult airway. Ok to proceed with procedure.

## 2024-01-09 ENCOUNTER — HOSPITAL ENCOUNTER (OUTPATIENT)
Dept: OCCUPATIONAL THERAPY | Age: 64
Setting detail: THERAPIES SERIES
Discharge: HOME | End: 2024-01-09
Payer: COMMERCIAL

## 2024-01-09 ENCOUNTER — HOSPITAL ENCOUNTER (OUTPATIENT)
Age: 64
Discharge: HOME | End: 2024-01-09
Payer: COMMERCIAL

## 2024-01-09 DIAGNOSIS — E11.69 TYPE 2 DIABETES MELLITUS WITH OTHER SPECIFIED COMPLICATION, WITHOUT LONG-TERM CURRENT USE OF INSULIN (HCC): ICD-10-CM

## 2024-01-09 PROCEDURE — 36415 COLL VENOUS BLD VENIPUNCTURE: CPT

## 2024-01-09 PROCEDURE — 83036 HEMOGLOBIN GLYCOSYLATED A1C: CPT

## 2024-01-09 PROCEDURE — 97166 OT EVAL MOD COMPLEX 45 MIN: CPT

## 2024-01-09 PROCEDURE — 97110 THERAPEUTIC EXERCISES: CPT

## 2024-01-09 PROCEDURE — 97014 ELECTRIC STIMULATION THERAPY: CPT

## 2024-01-09 PROCEDURE — 97140 MANUAL THERAPY 1/> REGIONS: CPT

## 2024-01-09 NOTE — PLAN OF CARE
Phone: [phone]                      [facility]           Fax: [phone]                           Outpatient Occupational Therapy                                                                            Initial Evaluation      Name:  Zack HENSON Fatuma PAREDES.: [birth year]  Date: 2024  Referring Physician: Dr. Frost  Medical Diagnosis: Impingement syndrome of R shoulder, M75.41; Tear of R rotator cuff, M75.101  Rehab Diagnosis/ICD-10#s:Weakness, M62.81; Impingement syndrome of R shoulder, M75.41; Tear of R rotator cuff, M75.101  Insurance: OT Insurance Information: [employer]  Total # of Visits to Date: 1  Next  Appointment: 3 months  Chief Complaint: post op rehab  CSN #: [id number]  Onset Date: 23    Mechanism of Injury: Fall with rotator cuff tear in 2018  Surgery Date: 23    Precautions:   []None  [] Fall Risk  []WB Status  [] Pacemaker   [x]Other: 3 weeks post op - follow protocol for rotator cuff repairs            Involved Extremity:      [] Left [x] Right  Dominant: [] Left [x]Right    Work Status:   [] Normal [] Restricted [x] Off D/T Injury/Condition [] Retired [] Unemployed [] Disabled []Other:  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 Patient states that initial injury happened in 2018 and he has not had any therapy or treatment. Patient noted to be having issued at work and became cumbersome to complete tasks. Patient elected for surgical intervention to improve work safety and quality.     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     Pain: Intensity:  8 /10 Location:   R shoulder  Pain Type: [] Constant [x] Intermittent   [  ] with pain meds at rest   [x] With

## 2024-01-10 ENCOUNTER — HOSPITAL ENCOUNTER (OUTPATIENT)
Dept: OCCUPATIONAL THERAPY | Age: 64
Setting detail: THERAPIES SERIES
Discharge: HOME | End: 2024-01-10
Payer: COMMERCIAL

## 2024-01-10 ENCOUNTER — OFFICE VISIT (OUTPATIENT)
Dept: ONCOLOGY | Age: 64
End: 2024-01-10
Payer: COMMERCIAL

## 2024-01-10 VITALS
RESPIRATION RATE: 18 BRPM | HEART RATE: 75 BPM | WEIGHT: 234 LBS | BODY MASS INDEX: 34.56 KG/M2 | SYSTOLIC BLOOD PRESSURE: 135 MMHG | TEMPERATURE: 98 F | DIASTOLIC BLOOD PRESSURE: 77 MMHG

## 2024-01-10 DIAGNOSIS — D64.9 NORMOCYTIC ANEMIA: Primary | ICD-10-CM

## 2024-01-10 LAB
EST. AVERAGE GLUCOSE BLD GHB EST-MCNC: 146 MG/DL
HBA1C MFR BLD: 6.7 % (ref 4–6)

## 2024-01-10 PROCEDURE — 3078F DIAST BP <80 MM HG: CPT | Performed by: INTERNAL MEDICINE

## 2024-01-10 PROCEDURE — 97014 ELECTRIC STIMULATION THERAPY: CPT

## 2024-01-10 PROCEDURE — 97110 THERAPEUTIC EXERCISES: CPT

## 2024-01-10 PROCEDURE — 3075F SYST BP GE 130 - 139MM HG: CPT | Performed by: INTERNAL MEDICINE

## 2024-01-10 PROCEDURE — 99214 OFFICE O/P EST MOD 30 MIN: CPT | Performed by: INTERNAL MEDICINE

## 2024-01-10 PROCEDURE — 97140 MANUAL THERAPY 1/> REGIONS: CPT

## 2024-01-10 NOTE — PROGRESS NOTES
Occupational Therapy  Phone: [phone]                 [facility]    Fax: [phone]                       Outpatient Occupational Therapy                 DAILY TREATMENT NOTE    Date: 1/10/2024  Patient’s Name:  Zack Burris Sr.  YOB: 1960 (63 y.o.)  Gender:  male  MRN:  [id number]  [facility] #: [id number]  Medical Diagnosis: Impingement syndrome of R shoulder, M75.41; Tear of R rotator cuff, M75.101    Referring Physician: Reji Resendiz MD     INSURANCE  OT Insurance Information: [employer]       Total # of Visits to Date: 2       PAIN  []No     [x]Yes      Location: R shoulder  Pain Rating (0-10 pain scale): 5/10  Pain Description: consistent    SUBJECTIVE   Pt presents wearing sling. No questions at this time         Flow Sheet   Exercise /   Manual treatment Weight/  Level Reps/Time Comments    pendulum x 1 min x 4 planes Reviewed as HEP    Scap clocks x 10 each Pro/retraction, elevation/depression, reviewed as HEP    pulleys x 2 min each Flex/ABD within protocol    PROM x X3-5 Supine to pt tolerance x all planes within protocol    Cane stretches                                                                                                                     Modality Flow Sheet:   START STOP Tx Modality    15' Electrical Stim: IFC to R shoulder to reduce pain/swelling. Pt tolerated well with skin intact pre and post. Paired with HP       Ultrasound: ___ W/cm2 x ___ mins  Duty factor: __100%  __50%  __20% __10%  Head size:   MHz: __1mHz __2 mHz  __3mHz  Location:    15' Hot Pack: R shoulder with IFC stim     Paraffin:     Cold Pack:     Dry Needling: ___\" needle to superficial radial, deep radial and  lataeral antebrachial cutaneous at homeostatic neuro- trigger points to...   ___No manipulation/static  ___Basic Manipulation  ___Pistoning Manipulation  ___Needle Rotation  ___Tenting           GOALS   Time Frame for Long Term Goals : 6 weeks     Long Term Goal 1: Patient to state

## 2024-01-11 NOTE — PROGRESS NOTES
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  In remission.  Ex-smoker.  CURRENT THERAPY:         Workup in progress for anemia.  BRIEF CASE HISTORY:      Mr. Zack Burris is a very pleasant 63 y.o. male with history of multiple co morbidities as listed.  Patient is referred for evaluation and further management of anemia.  Patient gives history of laryngeal cancer status post chemoradiation in 2013.  He is in remission.  He continued to have scarring from radiation.  Patient had multiple surgeries for different problems over the years.  He had labs which showed normocytic anemia.  Patient denies any active bleeding.  No melena or hematochezia.  No hematemesis.  He has mild weakness and fatigue.  No dizziness.  No palpitation.  No shortness of breath on exertion.  Patient had EGD which was negative.  Biopsy from small intestine was negative.  Last colonoscopy was 4 years ago which was negative.  Patient quit smoking 2008..   INTERIM HISTORY:   Patient seen for follow-up anemia.  He is clinically stable.  He just had surgery for right rotator cuff tear.  Recovering well.  No significant complaints at the present time.  No weakness or fatigue.  No dizziness.  No active bleeding.  No melena or hematochezia.  No hematemesis.  No other complaints.    PAST MEDICAL HISTORY: has a past medical history of Arthritis, CAD (coronary artery disease), Cancer (HCC), Carpal tunnel syndrome, Chronic back pain, Depression, Diabetes mellitus (HCC), Gastroesophageal reflux disease, Hearing loss, Hyperlipidemia, Hypertension, Movement disorder, Obesity, and Restless legs syndrome.    PAST SURGICAL HISTORY: has a past surgical history that includes joint replacement (06/25/2021); Cardiac surgery (2011);

## 2024-01-12 ENCOUNTER — HOSPITAL ENCOUNTER (OUTPATIENT)
Dept: OCCUPATIONAL THERAPY | Age: 64
Setting detail: THERAPIES SERIES
Discharge: HOME | End: 2024-01-12
Payer: COMMERCIAL

## 2024-01-12 PROCEDURE — 97014 ELECTRIC STIMULATION THERAPY: CPT

## 2024-01-12 PROCEDURE — 97140 MANUAL THERAPY 1/> REGIONS: CPT

## 2024-01-12 PROCEDURE — 97110 THERAPEUTIC EXERCISES: CPT

## 2024-01-12 NOTE — PROGRESS NOTES
Phone: [phone]                 [facility]    Fax: [phone]                       Outpatient Occupational Therapy                 DAILY TREATMENT NOTE    Date: 1/12/2024  Patient’s Name:  Zack Burris Sr.  YOB: 1960 (63 y.o.)  Gender:  male  MRN:  [id number]  [facility] #: [id number]  Medical Diagnosis: Impingement syndrome of R shoulder, M75.41; Tear of R rotator cuff, M75.101    Referring Physician: Reji Resendiz MD     INSURANCE  OT Insurance Information: [employer]       Total # of Visits to Date: 3       PAIN  []No     [x]Yes      Location: R shoulder  Pain Rating (0-10 pain scale): 6/10  Pain Description:     SUBJECTIVE   Patient states compliance c HEP, no questions at this time. Patient stated that he woke up this am  on R side with arm above head. Advised patient to wear sling at night.           Flow Sheet   Exercise /   Manual treatment Weight/  Level Reps/Time Comments    pendulum 2# X20 each Side <> side, forward <> backward and circular motion    Scap clocks 2#  10 each Pro/retraction, elevation/depression, reviewed as HEP    pulleys   Flex/ABD within protocol    PROM x X3-5 Supine to pt tolerance x all planes within protocol    SROM/cane x X10 c 5 second hold Supine shoulder flexion c clasping hands and IR/ER c cane.                                                                                                                                                        Modality Flow Sheet:   START STOP Tx Modality     10 minutes Electrical Stim: IFC to R shoulder to reduce pain/swelling. Pt tolerated well with skin intact pre and post. Paired with HP          Ultrasound: ___ W/cm2 x ___ mins  Duty factor: __100%  __50%  __20% __10%  Head size:   MHz: __1mHz __2 mHz  __3mHz  Location:      Hot Pack: R shoulder with IFC stim       Paraffin:     10 minutes  Cold Pack: combined c stim. Tolerated well c skin intact pre and post treatment.       Dry Needling: ___\" needle to

## 2024-01-16 ENCOUNTER — HOSPITAL ENCOUNTER (OUTPATIENT)
Dept: OCCUPATIONAL THERAPY | Age: 64
Setting detail: THERAPIES SERIES
Discharge: HOME | End: 2024-01-16
Payer: COMMERCIAL

## 2024-01-16 PROCEDURE — 97140 MANUAL THERAPY 1/> REGIONS: CPT

## 2024-01-16 PROCEDURE — 97110 THERAPEUTIC EXERCISES: CPT

## 2024-01-16 PROCEDURE — 97014 ELECTRIC STIMULATION THERAPY: CPT

## 2024-01-16 NOTE — PROGRESS NOTES
Phone: [phone]                 [facility]    Fax: [phone]                       Outpatient Occupational Therapy                 DAILY TREATMENT NOTE    Date: 1/16/2024  Patient’s Name:  Zack Burris Sr.  YOB: 1960 (63 y.o.)  Gender:  male  MRN:  [id number]  [facility] #: [id number]  Medical Diagnosis: Impingement syndrome of R shoulder, M75.41; Tear of R rotator cuff, M75.101    Referring Physician: Reji Resendiz MD     INSURANCE  OT Insurance Information: [employer]       Total # of Visits to Date: 4       PAIN  []No     [x]Yes      Location: R shoulder  Pain Rating (0-10 pain scale): 3/10  Pain Description:     SUBJECTIVE   Patient cont to report pain c elbow flexion               Flow Sheet   Exercise /   Manual treatment Weight/  Level Reps/Time Comments    pendulum 2# X20 each Side <> side, forward <> backward and circular motion    Scap clocks 2#  10 each Pro/retraction, elevation/depression, reviewed as HEP    pulleys     Flex/ABD within protocol    PROM x X3-5 Supine to pt tolerance x all planes within protocol    SROM/cane   Supine shoulder flexion c clasping hands and IR/ER c cane.    Grade 1-2 jt mobs x x R shoulder for pain management.                                                                                                                                                                Modality Flow Sheet:   START STOP Tx Modality     15 minutes Electrical Stim: IFC to R shoulder to reduce pain/swelling. Pt tolerated well with skin intact pre and post. Paired with HP          Ultrasound: ___ W/cm2 x ___ mins  Duty factor: __100%  __50%  __20% __10%  Head size:   MHz: __1mHz __2 mHz  __3mHz  Location:       Hot Pack: R shoulder with IFC stim       Paraffin:     15 minutes  Cold Pack: combined c stim. Tolerated well c skin intact pre and post treatment.       Dry Needling: ___\" needle to superficial radial, deep radial and  lataeral antebrachial cutaneous at

## 2024-01-19 ENCOUNTER — HOSPITAL ENCOUNTER (OUTPATIENT)
Dept: OCCUPATIONAL THERAPY | Age: 64
Setting detail: THERAPIES SERIES
Discharge: HOME | End: 2024-01-19
Payer: COMMERCIAL

## 2024-01-19 PROCEDURE — 97110 THERAPEUTIC EXERCISES: CPT

## 2024-01-19 PROCEDURE — 97014 ELECTRIC STIMULATION THERAPY: CPT

## 2024-01-19 PROCEDURE — 97140 MANUAL THERAPY 1/> REGIONS: CPT

## 2024-01-19 NOTE — PROGRESS NOTES
Phone: [phone]                 [facility]    Fax: [phone]                       Outpatient Occupational Therapy                 DAILY TREATMENT NOTE    Date: 1/19/2024  Patient’s Name:  Zack Burris Sr.  YOB: 1960 (63 y.o.)  Gender:  male  MRN:  [id number]  [facility] #: [id number]  Medical Diagnosis: Impingement syndrome of R shoulder, M75.41; Tear of R rotator cuff, M75.101    Referring Physician: Reji Resendiz MD     INSURANCE  OT Insurance Information: [employer]       Total # of Visits to Date: 5       PAIN  []No     [x]Yes      Location: R shoulder  Pain Rating (0-10 pain scale): 3/10  Pain Description:     SUBJECTIVE   Patient states he is getting bored and ready to get back to normal life. States that he is having trouble tightening belt, edu on reversing belt to tighten with LUE.               Flow Sheet   Exercise /   Manual treatment Weight/  Level Reps/Time Comments    pendulum 2# X20 each Side <> side, forward <> backward and circular motion    Scap clocks 3#  10 each Pro/retraction, elevation/depression, reviewed as HEP    pulleys     Flex/ABD within protocol    PROM x X3-5 Supine to pt tolerance x all planes within protocol    SROM/cane     Supine shoulder flexion c clasping hands and IR/ER c cane.    Grade 1-2 jt mobs x x R shoulder for pain management.                                                                                                                                                                Modality Flow Sheet:   START STOP Tx Modality     15 minutes Electrical Stim: IFC to R shoulder to reduce pain/swelling. Pt tolerated well with skin intact pre and post. Paired with HP          Ultrasound: ___ W/cm2 x ___ mins  Duty factor: __100%  __50%  __20% __10%  Head size:   MHz: __1mHz __2 mHz  __3mHz  Location:       Hot Pack: R shoulder with IFC stim       Paraffin:     15 minutes  Cold Pack: combined c stim. Tolerated well c skin intact pre and post

## 2024-01-24 ENCOUNTER — HOSPITAL ENCOUNTER (OUTPATIENT)
Dept: OCCUPATIONAL THERAPY | Age: 64
Setting detail: THERAPIES SERIES
Discharge: HOME | End: 2024-01-24
Payer: COMMERCIAL

## 2024-01-24 NOTE — PROGRESS NOTES
[facility]  Inpatient/Observation/Outpatient Rehabilitation    Date: 2024  Patient Name: Zack HENSON Fatuma Brewster.       [] Inpatient Acute/Observation       [x]  Outpatient  : 1960         [] Pt no showed for scheduled appointment    [] Pt refused/declined therapy at this time due to:           [x] Pt cancelled due to:  [] No Reason Given   [x] Sick/ill   [] Other:      Dionne Marrero OTR/L Date: 2024

## 2024-01-26 ENCOUNTER — HOSPITAL ENCOUNTER (OUTPATIENT)
Dept: OCCUPATIONAL THERAPY | Age: 64
Setting detail: THERAPIES SERIES
Discharge: HOME | End: 2024-01-26
Payer: COMMERCIAL

## 2024-01-26 NOTE — PROGRESS NOTES
Occupational Therapy  [facility]  Inpatient/Observation/Outpatient Rehabilitation    Date: 2024  Patient Name: Zack HENSON Fatuma Brewster.       [] Inpatient Acute/Observation       [x]  Outpatient  : 1960     [] Pt no showed for scheduled appointment    [] Pt refused/declined therapy at this time due to:           [x] Pt cancelled due to:  [] No Reason Given   [x] Sick/ill   [] Other:      MJ Hutchinson Date: 2024

## 2024-01-29 ENCOUNTER — HOSPITAL ENCOUNTER (OUTPATIENT)
Dept: OCCUPATIONAL THERAPY | Age: 64
Setting detail: THERAPIES SERIES
Discharge: HOME | End: 2024-01-29
Payer: COMMERCIAL

## 2024-01-29 PROCEDURE — 97014 ELECTRIC STIMULATION THERAPY: CPT

## 2024-01-29 PROCEDURE — 97110 THERAPEUTIC EXERCISES: CPT

## 2024-01-29 NOTE — PROGRESS NOTES
Phone: [phone]                 [facility]    Fax: [phone]                       Outpatient Occupational Therapy                 DAILY TREATMENT NOTE    Date: 1/29/2024  Patient’s Name:  Zack Burris Sr.  YOB: 1960 (63 y.o.)  Gender:  male  MRN:  [id number]  [facility] #: [id number]  Medical Diagnosis: Impingement syndrome of R shoulder, M75.41; Tear of R rotator cuff, M75.101    Referring Physician: Reji Resendiz MD     INSURANCE  OT Insurance Information: [employer]       Total # of Visits to Date: 6       PAIN  [x]No     []Yes      Location:   Pain Rating (0-10 pain scale):   Pain Description:     SUBJECTIVE   Patient has f/u tomorrow. PN sent.                Flow Sheet   Exercise /   Manual treatment Weight/  Level Reps/Time Comments    pendulum 4# X20 each Side <> side, forward <> backward and circular motion    Scap clocks 4#  10 each Pro/retraction, elevation/depression, reviewed as HEP    pulleys x  3' each Flex/ABD - initiated AAROM    PROM x X3-5 Supine to pt tolerance x all planes within protocol    SROM/cane     Supine shoulder flexion c clasping hands and IR/ER c cane.    Grade 1-2 jt mobs x x R shoulder for pain management.    AAROM x X10    x10 Paint roller  x10 flexion and abduction  Towel slides with IR/ER    Isometrics x x Educated on proper techniques and initiated as HEP.                                                                                                                                            Modality Flow Sheet:   START STOP Tx Modality     15 minutes Electrical Stim: IFC to R shoulder to reduce pain/swelling. Pt tolerated well with skin intact pre and post. Paired with HP          Ultrasound: ___ W/cm2 x ___ mins  Duty factor: __100%  __50%  __20% __10%  Head size:   MHz: __1mHz __2 mHz  __3mHz  Location:      15 minutes  Hot Pack: R shoulder with IFC stim       Paraffin:      Cold Pack: combined c stim. Tolerated well c skin intact pre and

## 2024-01-30 DIAGNOSIS — I10 PRIMARY HYPERTENSION: ICD-10-CM

## 2024-01-30 DIAGNOSIS — G62.9 NEUROPATHY: ICD-10-CM

## 2024-01-30 DIAGNOSIS — M54.12 CERVICAL RADICULOPATHY: ICD-10-CM

## 2024-01-30 DIAGNOSIS — K21.9 GASTROESOPHAGEAL REFLUX DISEASE, UNSPECIFIED WHETHER ESOPHAGITIS PRESENT: ICD-10-CM

## 2024-01-30 DIAGNOSIS — E78.1 HYPERTRIGLYCERIDEMIA: ICD-10-CM

## 2024-01-30 RX ORDER — OMEPRAZOLE 20 MG/1
20 CAPSULE, DELAYED RELEASE ORAL DAILY
Qty: 90 CAPSULE | Refills: 0 | Status: SHIPPED | OUTPATIENT
Start: 2024-01-30

## 2024-01-30 RX ORDER — CELECOXIB 200 MG/1
200 CAPSULE ORAL 2 TIMES DAILY
Qty: 60 CAPSULE | Refills: 0 | Status: SHIPPED | OUTPATIENT
Start: 2024-01-30

## 2024-01-30 RX ORDER — METOPROLOL SUCCINATE 50 MG/1
25 TABLET, EXTENDED RELEASE ORAL 2 TIMES DAILY
Qty: 90 TABLET | Refills: 1 | Status: SHIPPED | OUTPATIENT
Start: 2024-01-30

## 2024-01-30 RX ORDER — LISINOPRIL 20 MG/1
20 TABLET ORAL DAILY
Qty: 90 TABLET | Refills: 1 | Status: SHIPPED | OUTPATIENT
Start: 2024-01-30

## 2024-01-30 RX ORDER — FENOFIBRATE 48 MG/1
48 TABLET, COATED ORAL DAILY
Qty: 90 TABLET | Refills: 1 | Status: SHIPPED | OUTPATIENT
Start: 2024-01-30

## 2024-01-30 RX ORDER — FUROSEMIDE 40 MG/1
40 TABLET ORAL DAILY
Qty: 90 TABLET | Refills: 1 | Status: SHIPPED | OUTPATIENT
Start: 2024-01-30

## 2024-01-30 RX ORDER — ROSUVASTATIN CALCIUM 40 MG/1
40 TABLET, COATED ORAL DAILY
Qty: 90 TABLET | Refills: 1 | Status: SHIPPED | OUTPATIENT
Start: 2024-01-30

## 2024-01-31 ENCOUNTER — HOSPITAL ENCOUNTER (OUTPATIENT)
Dept: OCCUPATIONAL THERAPY | Age: 64
Setting detail: THERAPIES SERIES
Discharge: HOME | End: 2024-01-31
Payer: COMMERCIAL

## 2024-01-31 PROCEDURE — 97014 ELECTRIC STIMULATION THERAPY: CPT

## 2024-01-31 PROCEDURE — 97110 THERAPEUTIC EXERCISES: CPT

## 2024-01-31 NOTE — PROGRESS NOTES
Occupational Therapy  Phone: [phone]                 [facility]    Fax: [phone]                       Outpatient Occupational Therapy                 DAILY TREATMENT NOTE    Date: 1/31/2024  Patient’s Name:  Zack Burris Sr.  YOB: 1960 (63 y.o.)  Gender:  male  MRN:  [id number]  [facility] #: [id number]  Medical Diagnosis: Impingement syndrome of R shoulder, M75.41; Tear of R rotator cuff, M75.101    Referring Physician: Reji Resendiz MD     INSURANCE  OT Insurance Information: [employer]       Total # of Visits to Date: 7       PAIN  []No     [x]Yes      Location: R shoulder  Pain Rating (0-10 pain scale): 2/10  Pain Description: achy    SUBJECTIVE   Pt saw surgeon yesterday, d/c sling wear, no AROM overhead, no pushing, pulling, climbing. \"I am not returning to work yet.\"   Pt reports min pain/difficulty with behind back and IR/ER acts, posterior clothing management, belts, etc.                Flow Sheet   Exercise /   Manual treatment Weight/  Level Reps/Time Comments    pendulum 4# X20 each Side <> side, forward <> backward and circular motion    Scap clocks 4#  10 each Pro/retraction, elevation/depression, reviewed as HEP    pulleys x  3' each Flex/ABD - initiated AAROM    PROM   Supine to pt tolerance x all planes within protocol    SROM/cane x  x10  Supine shoulder flexion/ABD c clasping hands and IR/ER c cane.    Grade 1-2 jt mobs   R shoulder for pain management.    AAROM   Paint roller  x10 flexion and abduction  Towel slides with IR/ER    Isometrics x x Educated on proper techniques and initiated as HEP.    \"around the world\" Yellow ball  X 5 each way  to increase ER/IR                                                                                                                                  Modality Flow Sheet:   START STOP Tx Modality     12 minutes Electrical Stim: IFC to R shoulder to reduce pain/swelling. Pt tolerated well with skin intact pre and post. Paired

## 2024-02-02 ENCOUNTER — HOSPITAL ENCOUNTER (OUTPATIENT)
Dept: OCCUPATIONAL THERAPY | Age: 64
Setting detail: THERAPIES SERIES
Discharge: HOME | End: 2024-02-02
Payer: COMMERCIAL

## 2024-02-02 PROCEDURE — 97014 ELECTRIC STIMULATION THERAPY: CPT

## 2024-02-02 PROCEDURE — 97110 THERAPEUTIC EXERCISES: CPT

## 2024-02-02 NOTE — PROGRESS NOTES
Paraffin:       Cold Pack: combined c stim. Tolerated well c skin intact pre and post treatment.       Dry Needling: ___\" needle to superficial radial, deep radial and  lateral antebrachial cutaneous at homeostatic neuro- trigger points to...   ___No manipulation/static  ___Basic Manipulation  ___Pistoning Manipulation  ___Needle Rotation  ___Tenting                       GOALS   Time Frame for Long Term Goals : 6 weeks       Long Term Goal 1: Patient to state <15% impairment throughout daily tasks, as measured by the DASH. continue []Met  [x]Partially met  []Not met   Long Term Goal 2: Patient to demosntrate AROM R shoulder flexion to 170, extension to 70, IR and ER to 90 and abduction to 155 to improve ROM for functional use. Continue   Extension: MET  ER: MET  Abduction: MET       []Met  [x]Partially met  []Not met   Long Term Goal 3: Patient to improve MMT R shoulder (when appropriate per protocol) to 4+/5 to improve strength and provide safe return to work. continue []Met  [x]Partially met  []Not met   Time Frame for Short Term Goals: 4 weeks       Short Term Goal 1: Patient to be educated on HEP to improve RUE function s/p rotator cuff repair. Met. Patient educated on initial HEP per protocol. Update as patient progresses.  [x]Met  []Partially met  []Not met   Short Term Goal 2: Patient to improve R shoulder PROM into flexion 125*, ER and IR  (scapular plane) 75* to pogress ROM. Met   [x]Met  []Partially met  []Not met   Short Term Goal 3: Patient to demonstrate full elbow flexion without c/o pain in R shoulder to improve functional use. Met    [x]Met  []Partially met  []Not met   ADDITIONAL COMMENTS               EDUCATION  New Education provided to patient/family/caregiver:    []Yes:     [x]No (Continued review of prior education)   If yes Education Provided:     Method of Education:     [x]Discussion     []Demonstration    [] Written     []Other  Evaluation of Patient’s Response to Education:

## 2024-02-05 ENCOUNTER — HOSPITAL ENCOUNTER (OUTPATIENT)
Dept: OCCUPATIONAL THERAPY | Age: 64
Setting detail: THERAPIES SERIES
Discharge: HOME | End: 2024-02-05
Payer: COMMERCIAL

## 2024-02-05 PROCEDURE — 97140 MANUAL THERAPY 1/> REGIONS: CPT

## 2024-02-05 PROCEDURE — 97014 ELECTRIC STIMULATION THERAPY: CPT

## 2024-02-05 PROCEDURE — 97110 THERAPEUTIC EXERCISES: CPT

## 2024-02-07 ENCOUNTER — HOSPITAL ENCOUNTER (OUTPATIENT)
Dept: OCCUPATIONAL THERAPY | Age: 64
Setting detail: THERAPIES SERIES
Discharge: HOME | End: 2024-02-07
Payer: COMMERCIAL

## 2024-02-07 NOTE — PROGRESS NOTES
[facility]  Inpatient/Observation/Outpatient Rehabilitation    Date: 2024  Patient Name: Zack HENSON Fatuma Brewster.       [] Inpatient Acute/Observation       [x]  Outpatient  : 1960       [] Pt no showed for scheduled appointment    [] Pt refused/declined therapy at this time due to:           [x] Pt cancelled due to:  [] No Reason Given   [] Sick/ill   [] Other:      Dionne Marrero OTR/L Date: 2024

## 2024-02-09 ENCOUNTER — HOSPITAL ENCOUNTER (OUTPATIENT)
Dept: OCCUPATIONAL THERAPY | Age: 64
Setting detail: THERAPIES SERIES
Discharge: HOME | End: 2024-02-09
Payer: COMMERCIAL

## 2024-02-09 PROCEDURE — 97110 THERAPEUTIC EXERCISES: CPT

## 2024-02-09 PROCEDURE — 97014 ELECTRIC STIMULATION THERAPY: CPT

## 2024-02-09 PROCEDURE — 97140 MANUAL THERAPY 1/> REGIONS: CPT

## 2024-02-09 NOTE — PROGRESS NOTES
Phone: [phone]                 [facility]    Fax: [phone]                       Outpatient Occupational Therapy                 DAILY TREATMENT NOTE    Date: 2/9/2024  Patient’s Name:  Zack Burris Sr.  YOB: 1960 (63 y.o.)  Gender:  male  MRN:  [id number]  [facility] #: [id number]  Medical Diagnosis: Impingement syndrome of R shoulder, M75.41; Tear of R rotator cuff, M75.101    Referring Physician: Reji Resendiz MD     INSURANCE  OT Insurance Information: [employer]       Total # of Visits to Date: 10       PAIN  []No     [x]Yes      Location: R elbow and shoulder  Pain Rating (0-10 pain scale): did not rate  Pain Description:     SUBJECTIVE   Patient with c/o severe elbow pain during abduction isometrics.                 Flow Sheet   Exercise /   Manual treatment Weight/  Level Reps/Time Comments    pendulum     Side <> side, forward <> backward and circular motion    Scap clocks 4#  10 each Pro/retraction, elevation/depression,     pulleys x  3' each Flex/scaption     PROM   Supine to pt tolerance x all planes within protocol    SROM/cane   Standing  shoulder flexion/ABD c clasping hands and IR/ER c cane.    AAROM x  x10  AAROM with paint roller    Isometrics     Flexion and abduction only this date **Patient with increased c/o elbow pain during ABD, unable to extend arm temporarily     \"around the [place]\"      to increase ER/IR   Joint mobs x x Grade 1-2 R shoulder for pain.    PROM elbow with BR stretch x X5 with 10 second hold R elbow due to c/o tightness and pain during isometrics   cupping x X3 minutes R bicep and BR for pain                                                                                                   Modality Flow Sheet:   START STOP Tx Modality     10 minutes Electrical Stim: IFC to R elbow to reduce pain/swelling. Pt tolerated well with skin intact pre and post. Paired with HP          Ultrasound: ___ W/cm2 x ___ mins  Duty factor: __100%  __50%

## 2024-02-13 ENCOUNTER — OFFICE VISIT (OUTPATIENT)
Dept: PRIMARY CARE CLINIC | Age: 64
End: 2024-02-13
Payer: COMMERCIAL

## 2024-02-13 VITALS
OXYGEN SATURATION: 94 % | HEART RATE: 64 BPM | BODY MASS INDEX: 34.07 KG/M2 | SYSTOLIC BLOOD PRESSURE: 120 MMHG | HEIGHT: 69 IN | DIASTOLIC BLOOD PRESSURE: 62 MMHG | WEIGHT: 230 LBS

## 2024-02-13 DIAGNOSIS — G25.81 RESTLESS LEG SYNDROME: ICD-10-CM

## 2024-02-13 DIAGNOSIS — E11.69 TYPE 2 DIABETES MELLITUS WITH OTHER SPECIFIED COMPLICATION, WITHOUT LONG-TERM CURRENT USE OF INSULIN (HCC): Primary | ICD-10-CM

## 2024-02-13 DIAGNOSIS — E78.1 HYPERTRIGLYCERIDEMIA: ICD-10-CM

## 2024-02-13 DIAGNOSIS — I10 PRIMARY HYPERTENSION: ICD-10-CM

## 2024-02-13 PROCEDURE — 99214 OFFICE O/P EST MOD 30 MIN: CPT | Performed by: STUDENT IN AN ORGANIZED HEALTH CARE EDUCATION/TRAINING PROGRAM

## 2024-02-13 PROCEDURE — 3044F HG A1C LEVEL LT 7.0%: CPT | Performed by: STUDENT IN AN ORGANIZED HEALTH CARE EDUCATION/TRAINING PROGRAM

## 2024-02-13 PROCEDURE — 3078F DIAST BP <80 MM HG: CPT | Performed by: STUDENT IN AN ORGANIZED HEALTH CARE EDUCATION/TRAINING PROGRAM

## 2024-02-13 PROCEDURE — 3074F SYST BP LT 130 MM HG: CPT | Performed by: STUDENT IN AN ORGANIZED HEALTH CARE EDUCATION/TRAINING PROGRAM

## 2024-02-13 NOTE — PROGRESS NOTES
[facility] PRIMARY [facility]ARE  [address] [place] [address] , [address]  [place], [place], [address]    Zack Burris Sr. is a 63 y.o. male with  has a past medical history of Arthritis, CAD (coronary artery disease), Cancer (HCC), Carpal tunnel syndrome, Chronic back pain, Depression, Diabetes mellitus (HCC), Gastroesophageal reflux disease, Hearing loss, Hyperlipidemia, Hypertension, Movement disorder, Obesity, and Restless legs syndrome.  Presented to the office today for:  Chief Complaint   Patient presents with    Diabetes    Hypertension    Hyperlipidemia       Assessment/Plan   1. Type 2 diabetes mellitus with other specified complication, without long-term current use of insulin (HCC)  -     Hemoglobin A1C; Future  -     CBC with Auto Differential; Future  -     Comprehensive Metabolic Panel; Future  2. Hypertriglyceridemia  -     Lipid Panel; Future  3. Primary hypertension  4. Restless leg syndrome  Return in about 3 months (around 5/13/2024) for F/U Meds.    T2DM - Continue w/ diet/exercise at this time, no meds per patient, RBA discussed today, A1c nearly at goal 6.7  HTN - continue w/ meds at the current doses  Continue w/ statin at the current dose.  F/U with Specialists per prior plans     All patient questions answered.  Pt voiced understanding.   There are no discontinued medications.    Patient received counseling on the following healthy behaviors: nutrition, exercise and medication adherence. I encouraged and discussed lifestyle modifications including diet and exercise and the patient was agreeable to making positive/beneficial changes to both to help improve their overall health. Discussed use, benefit, and side effects of prescribed medications.  Barriers to medication compliance addressed. Patient given educational materials: see patient instructions.     HM - HM items completed today as per orders. Outstanding HM items though not limited to immunizations were discussed with the patient today,

## 2024-02-15 ENCOUNTER — HOSPITAL ENCOUNTER (OUTPATIENT)
Dept: OCCUPATIONAL THERAPY | Age: 64
Setting detail: THERAPIES SERIES
Discharge: HOME | End: 2024-02-15
Payer: COMMERCIAL

## 2024-02-15 PROCEDURE — 97110 THERAPEUTIC EXERCISES: CPT

## 2024-02-15 PROCEDURE — 97140 MANUAL THERAPY 1/> REGIONS: CPT

## 2024-02-15 PROCEDURE — 97014 ELECTRIC STIMULATION THERAPY: CPT

## 2024-02-15 NOTE — PROGRESS NOTES
Phone: [phone]                 [facility]    Fax: [phone]                       Outpatient Occupational Therapy                 DAILY TREATMENT NOTE    Date: 2/15/2024  Patient’s Name:  Zack Burris Sr.  YOB: 1960 (63 y.o.)  Gender:  male  MRN:  [id number]  [facility] #: [id number]  Medical Diagnosis: Impingement syndrome of R shoulder, M75.41; Tear of R rotator cuff, M75.101    Referring Physician: Reji Resendiz MD     INSURANCE  OT Insurance Information: [employer]       Total # of Visits to Date: 11       PAIN  []No     [x]Yes      Location: R shoulder and elbow  Pain Rating (0-10 pain scale): 6/10  Pain Description:     SUBJECTIVE   Reports increased pain, difficulty sleeping. Patient presents with distal biceps tendonitis.                   Flow Sheet   Exercise /   Manual treatment Weight/  Level Reps/Time Comments    pendulum     Side <> side, forward <> backward and circular motion    Scap clocks 4#  10 each Pro/retraction, elevation/depression,     pulleys x  3' each Flex/scaption     PROM     Supine to pt tolerance x all planes within protocol    SROM/cane     Standing  shoulder flexion/ABD c clasping hands and IR/ER c cane.    AAROM x  x10  AAROM with paint roller    Isometrics  x X10 with 5-10 second hold X 6 shoulder planes. Completed abduction at 30* to trigger delt, increased tolerance this date.     \"around the [place]\"      to increase ER/IR   Joint mobs x x Grade 1-2 R shoulder for pain.    PROM elbow with BR stretch x X5 with 10 second hold R elbow due to c/o tightness and pain during isometrics   cupping x X3 minutes R bicep and BR for pain    bike 1.0 3' forward, 3' back MM warm up     Dedra stick x x10 Pro/Retraction, forward/backward, and abd/add to improve active movement and strength    Towel stretch x x10 Improve IR                                                                      Modality Flow Sheet:   START STOP Tx Modality     10 minutes Electrical

## 2024-02-16 ENCOUNTER — HOSPITAL ENCOUNTER (OUTPATIENT)
Dept: OCCUPATIONAL THERAPY | Age: 64
Setting detail: THERAPIES SERIES
Discharge: HOME | End: 2024-02-16
Payer: COMMERCIAL

## 2024-02-16 NOTE — PROGRESS NOTES
Occupational Therapy  [facility]  Inpatient/Observation/Outpatient Rehabilitation    Date: 2024  Patient Name: Zack HENSON Fatuma Brewster.       [] Inpatient Acute/Observation       [x]  Outpatient  : 1960       [] Pt no showed for scheduled appointment    [] Pt refused/declined therapy at this time due to:           [x] Pt cancelled due to:  [] No Reason Given   [] Sick/ill   [x] Other: Pt called office to report his \"elbow hurt too bad to come to therapy today.\"    [] Evaluation held by RN/Provider due to:    [] High Heart Rate   [] High Blood Pressure   [] Orthopedic Consult   [] Hgb < 7   [] Other:      MJ Hutchinson Date: 2024

## 2024-02-20 ENCOUNTER — HOSPITAL ENCOUNTER (OUTPATIENT)
Dept: OCCUPATIONAL THERAPY | Age: 64
Setting detail: THERAPIES SERIES
Discharge: HOME | End: 2024-02-20
Payer: COMMERCIAL

## 2024-02-20 NOTE — PROGRESS NOTES
[facility]  Inpatient/Observation/Outpatient Rehabilitation    Date: 2024  Patient Name: Zack HENSON Fatuma Brewster.       [] Inpatient Acute/Observation       []  Outpatient  : 1960         [x] Pt no showed for scheduled appointment  - contacted patient and LM for next appt.    [] Pt refused/declined therapy at this time due to:           [] Pt cancelled due to:  [] No Reason Given   [] Sick/ill   [] Other:      Therapist/Assistant will attempt to see this patient, at our earliest opportunity.       Dionne Marrero, OTR/L Date: 2024

## 2024-02-22 ENCOUNTER — HOSPITAL ENCOUNTER (OUTPATIENT)
Dept: OCCUPATIONAL THERAPY | Age: 64
Setting detail: THERAPIES SERIES
Discharge: HOME | End: 2024-02-22
Payer: COMMERCIAL

## 2024-02-22 PROCEDURE — 97110 THERAPEUTIC EXERCISES: CPT

## 2024-02-22 PROCEDURE — 97014 ELECTRIC STIMULATION THERAPY: CPT

## 2024-02-22 NOTE — PROGRESS NOTES
skin intact pre and post. Paired with CP          Ultrasound: ___ W/cm2 x ___ mins  Duty factor: __100%  __50%  __20% __10%  Head size:   MHz: __1mHz __2 mHz  __3mHz  Location:     Hot Pack: R shoulder with IFC stim       Paraffin:      15' Cold Pack: combined c stim. Tolerated well c skin intact pre and post treatment.       Dry Needling: ___\" needle to superficial radial, deep radial and  lateral antebrachial cutaneous at homeostatic neuro- trigger points to...   ___No manipulation/static  ___Basic Manipulation  ___Pistoning Manipulation  ___Needle Rotation  ___Tenting                       GOALS   Time Frame for Long Term Goals : 6 weeks       Long Term Goal 1: Patient to state <15% impairment throughout daily tasks, as measured by the DASH. continue []Met  [x]Partially met  []Not met   Long Term Goal 2: Patient to demosntrate AROM R shoulder flexion to 170, extension to 70, IR and ER to 90 and abduction to 155 to improve ROM for functional use. Continue   Extension: MET  ER: MET  Abduction: MET       []Met  [x]Partially met  []Not met   Long Term Goal 3: Patient to improve MMT R shoulder (when appropriate per protocol) to 4+/5 to improve strength and provide safe return to work. Continue  4+/5 for all except ADD and IR  []Met  [x]Partially met  []Not met   Time Frame for Short Term Goals: 4 weeks       Short Term Goal 1: Patient to be educated on HEP to improve RUE function s/p rotator cuff repair. Met. Patient educated on initial HEP per protocol. Update as patient progresses.  [x]Met  []Partially met  []Not met   Short Term Goal 2: Patient to improve R shoulder PROM into flexion 125*, ER and IR  (scapular plane) 75* to pogress ROM. Met    [x]Met  []Partially met  []Not met   Short Term Goal 3: Patient to demonstrate full elbow flexion without c/o pain in R shoulder to improve functional use. Met    [x]Met  []Partially met  []Not met   ADDITIONAL COMMENTS             EDUCATION  New Education provided to

## 2024-02-26 ENCOUNTER — HOSPITAL ENCOUNTER (OUTPATIENT)
Dept: OCCUPATIONAL THERAPY | Age: 64
Setting detail: THERAPIES SERIES
Discharge: HOME | End: 2024-02-26
Payer: COMMERCIAL

## 2024-02-26 PROCEDURE — 97014 ELECTRIC STIMULATION THERAPY: CPT

## 2024-02-26 PROCEDURE — 97110 THERAPEUTIC EXERCISES: CPT

## 2024-02-26 NOTE — PROGRESS NOTES
Occupational Therapy  Phone: [phone]                 [facility]    Fax: [phone]                       Outpatient Occupational Therapy                 DAILY TREATMENT NOTE    Date: 2/26/2024  Patient’s Name:  Zack Burris Sr.  YOB: 1960 (63 y.o.)  Gender:  male  MRN:  [id number]  [facility] #: [id number]  Medical Diagnosis: Impingement syndrome of R shoulder, M75.41; Tear of R rotator cuff, M75.101    Referring Physician: Reji Resendiz MD     INSURANCE  OT Insurance Information: [employer]       Total # of Visits to Date: 13       PAIN  []No     [x]Yes      Location: R shoulder and elbow   Pain Rating (0-10 pain scale): 1/10  Pain Description:     SUBJECTIVE   Pt states pain is much lower and his elbow is feeling much better    Flow Sheet   Exercise /   Manual treatment Weight/  Level Reps/Time Comments    pendulum     Side <> side, forward <> backward and circular motion    Scap clocks   Pro/retraction, elevation/depression,     pulleys x  3' each Flex/scaption     PROM     Supine to pt tolerance x all planes within protocol    SROM/cane     Standing  shoulder flexion/ABD c clasping hands and IR/ER c cane.    AAROM x  x10  AAROM with paint roller    Isometrics    X 6 shoulder planes. Completed abduction at 30* to trigger delt, increased tolerance this date.     \"around the world\"      to increase ER/IR   Joint mobs   Grade 1-2 R shoulder for pain.    PROM elbow with BR stretch x X5 with 10 second hold R elbow due to c/o tightness and pain during isometrics   cupping   R bicep and BR for pain    bike 1.0 3' forward, 3' back MM warm up     Dedra stick x x10 Pro/Retraction, forward/backward, and abd/add to improve active movement and strength    Towel stretch x x10 Improve IR    thera bands blue X20  X6 planes shoulder to increase strength and ROM                                                            Modality Flow Sheet:   START STOP Tx Modality     12 minutes Electrical Stim: IFC

## 2024-03-01 ENCOUNTER — HOSPITAL ENCOUNTER (OUTPATIENT)
Dept: OCCUPATIONAL THERAPY | Age: 64
Setting detail: THERAPIES SERIES
Discharge: HOME | End: 2024-03-01
Payer: COMMERCIAL

## 2024-03-01 NOTE — PROGRESS NOTES
Occupational Therapy  [facility]  Inpatient/Observation/Outpatient Rehabilitation    Date: 3/1/2024  Patient Name: Zack HENSON Fatuma Brewster.       [] Inpatient Acute/Observation       [x]  Outpatient  : 1960     2024  Plan of Care/Recert ends    [] Pt no showed for scheduled appointment    [] Pt refused/declined therapy at this time due to:           [x] Pt cancelled due to:  [] No Reason Given   [] Sick/ill   [x] Other: called and stated he would be out of state    [] Evaluation held by RN/Provider due to:    [] High Heart Rate   [] High Blood Pressure   [] Orthopedic Consult   [] Hgb < 7   [] Other:          Leigh Brayden Date: 3/1/2024

## 2024-03-04 ENCOUNTER — HOSPITAL ENCOUNTER (OUTPATIENT)
Dept: OCCUPATIONAL THERAPY | Age: 64
Setting detail: THERAPIES SERIES
Discharge: HOME | End: 2024-03-04
Payer: COMMERCIAL

## 2024-03-04 PROCEDURE — 97110 THERAPEUTIC EXERCISES: CPT

## 2024-03-04 NOTE — PROGRESS NOTES
Phone: [phone]                 [facility]    Fax: [phone]                       Outpatient Occupational Therapy                 DAILY TREATMENT NOTE    Date: 3/4/2024  Patient’s Name:  Zack Burris Sr.  YOB: 1960 (64 y.o.)  Gender:  male  MRN:  [id number]  [facility] #: [id number]  Medical Diagnosis: Impingement syndrome of R shoulder, M75.41; Tear of R rotator cuff, M75.101    Referring Physician: Reji Resendiz MD     INSURANCE  OT Insurance Information: [employer]       Total # of Visits to Date: 14       PAIN  []No     [x]Yes      Location: R shoulder  Pain Rating (0-10 pain scale): 1/10  Pain Description:     SUBJECTIVE   Patient states more his neck and low back have been the most problematic, no concerns with shoulder. Patient states he is ready for d/c             Flow Sheet   Exercise /   Manual treatment Weight/  Level Reps/Time Comments    pendulum     Side <> side, forward <> backward and circular motion    Scap clocks Grey band  x15 Pro/retraction, elevation/depression,     pulleys   Flex/scaption     PROM     Supine to pt tolerance x all planes within protocol    SROM/cane     Standing  shoulder flexion/ABD c clasping hands and IR/ER c cane.    AAROM   AAROM with paint roller    Isometrics     X 6 shoulder planes. Completed abduction at 30* to trigger delt, increased tolerance this date.     \"around the world\"  5# weight bar x5 Improve strength   Joint mobs     Grade 1-2 R shoulder for pain.    PROM elbow with BR stretch   R elbow due to c/o tightness and pain during isometrics   cupping     R bicep and BR for pain    bike 3.5 3' forward, 3' back MM warm up     Dedra stick x x10 Pro/Retraction, forward/backward, and abd/add to improve active movement and strength    Towel stretch   Improve IR    thera bands grey X20  X6 planes shoulder to increase strength and ROM    Weight bar 5# x20 Flexion/scaption to improve strength

## 2024-03-07 ENCOUNTER — APPOINTMENT (OUTPATIENT)
Dept: OCCUPATIONAL THERAPY | Age: 64
End: 2024-03-07
Payer: COMMERCIAL

## 2024-03-12 ENCOUNTER — APPOINTMENT (OUTPATIENT)
Dept: OCCUPATIONAL THERAPY | Age: 64
End: 2024-03-12
Payer: COMMERCIAL

## 2024-03-15 ENCOUNTER — APPOINTMENT (OUTPATIENT)
Dept: OCCUPATIONAL THERAPY | Age: 64
End: 2024-03-15
Payer: COMMERCIAL

## 2024-03-19 ENCOUNTER — APPOINTMENT (OUTPATIENT)
Dept: OCCUPATIONAL THERAPY | Age: 64
End: 2024-03-19
Payer: COMMERCIAL

## 2024-03-22 ENCOUNTER — APPOINTMENT (OUTPATIENT)
Dept: OCCUPATIONAL THERAPY | Age: 64
End: 2024-03-22
Payer: COMMERCIAL

## 2024-04-12 DIAGNOSIS — G25.81 RESTLESS LEG SYNDROME: ICD-10-CM

## 2024-04-12 RX ORDER — PRAMIPEXOLE DIHYDROCHLORIDE 1.5 MG/1
1.5 TABLET ORAL NIGHTLY
Qty: 90 TABLET | Refills: 0 | Status: SHIPPED | OUTPATIENT
Start: 2024-04-12

## 2024-05-03 NOTE — PROGRESS NOTES
Phone: [phone]                 [facility]    Fax: [phone]                       Outpatient Occupational Therapy                 DAILY TREATMENT NOTE    Date: 2/5/2024  Patient’s Name:  Zack Burris Sr.  YOB: 1960 (63 y.o.)  Gender:  male  MRN:  [id number]  [facility] #: [id number]  Medical Diagnosis: Impingement syndrome of R shoulder, M75.41; Tear of R rotator cuff, M75.101    Referring Physician: Reji Resendiz MD     INSURANCE  OT Insurance Information: [employer]       Total # of Visits to Date: 9       PAIN  []No     [x]Yes      Location: R shoulder  Pain Rating (0-10 pain scale): 5/10  Pain Description:     SUBJECTIVE   Patient states increased pain this date. States that he had a difficulty time sleeping last night d/t RLS, tossing and turning.                Flow Sheet   Exercise /   Manual treatment Weight/  Level Reps/Time Comments    pendulum     Side <> side, forward <> backward and circular motion    Scap clocks 4#  10 each Pro/retraction, elevation/depression,     pulleys x  3' each Flex/scaption     PROM  x  x Supine to pt tolerance x all planes within protocol    SROM/cane x  x10  Standing  shoulder flexion/ABD c clasping hands and IR/ER c cane.    Grade 1-2 jt mobs     R shoulder for pain management.    AAROM x  x10  AAROM with pain roller    Isometrics   Add/abd, Flex/extension, IR/ER    \"around the world\"    to increase ER/IR   Joint mobs x x Grade 1-2 R shoulder for pain.                                                                                                                       Modality Flow Sheet:   START STOP Tx Modality     15 minutes Electrical Stim: IFC to R shoulder to reduce pain/swelling. Pt tolerated well with skin intact pre and post. Paired with HP          Ultrasound: ___ W/cm2 x ___ mins  Duty factor: __100%  __50%  __20% __10%  Head size:   MHz: __1mHz __2 mHz  __3mHz  Location:      15 minutes  Hot Pack: R shoulder with IFC stim        No

## 2024-05-20 LAB — DIABETIC RETINOPATHY: NEGATIVE

## 2024-05-21 ENCOUNTER — HOSPITAL ENCOUNTER (OUTPATIENT)
Age: 64
Discharge: HOME | End: 2024-05-21
Payer: COMMERCIAL

## 2024-05-21 DIAGNOSIS — E11.69 TYPE 2 DIABETES MELLITUS WITH OTHER SPECIFIED COMPLICATION, WITHOUT LONG-TERM CURRENT USE OF INSULIN (HCC): ICD-10-CM

## 2024-05-21 DIAGNOSIS — E78.1 HYPERTRIGLYCERIDEMIA: ICD-10-CM

## 2024-05-21 LAB
ALBUMIN SERPL-MCNC: 4 G/DL (ref 3.5–5.2)
ALBUMIN/GLOB SERPL: 1.7 {RATIO} (ref 1–2.5)
ALP SERPL-CCNC: 62 U/L (ref 40–129)
ALT SERPL-CCNC: 25 U/L (ref 5–41)
ANION GAP SERPL CALCULATED.3IONS-SCNC: 7 MMOL/L (ref 9–17)
AST SERPL-CCNC: 22 U/L
BASOPHILS # BLD: 0.03 K/UL (ref 0–0.2)
BASOPHILS NFR BLD: 1 % (ref 0–2)
BILIRUB SERPL-MCNC: 0.3 MG/DL (ref 0.3–1.2)
BUN SERPL-MCNC: 26 MG/DL (ref 8–23)
BUN/CREAT SERPL: 37 (ref 9–20)
CALCIUM SERPL-MCNC: 8.8 MG/DL (ref 8.6–10.4)
CHLORIDE SERPL-SCNC: 108 MMOL/L (ref 98–107)
CHOLEST SERPL-MCNC: 127 MG/DL (ref 0–199)
CHOLESTEROL/HDL RATIO: 3
CO2 SERPL-SCNC: 27 MMOL/L (ref 20–31)
CREAT SERPL-MCNC: 0.7 MG/DL (ref 0.7–1.2)
EOSINOPHIL # BLD: 0.2 K/UL (ref 0–0.44)
EOSINOPHILS RELATIVE PERCENT: 5 % (ref 1–4)
ERYTHROCYTE [DISTWIDTH] IN BLOOD BY AUTOMATED COUNT: 12.9 % (ref 11.8–14.4)
EST. AVERAGE GLUCOSE BLD GHB EST-MCNC: 128 MG/DL
GFR, ESTIMATED: >90 ML/MIN/1.73M2
GLUCOSE SERPL-MCNC: 106 MG/DL (ref 70–99)
HBA1C MFR BLD: 6.1 % (ref 4–6)
HCT VFR BLD AUTO: 37.6 % (ref 40.7–50.3)
HDLC SERPL-MCNC: 43 MG/DL
HGB BLD-MCNC: 12.3 G/DL (ref 13–17)
IMM GRANULOCYTES # BLD AUTO: <0.03 K/UL (ref 0–0.3)
IMM GRANULOCYTES NFR BLD: 0 %
LDLC SERPL CALC-MCNC: 61 MG/DL (ref 0–100)
LYMPHOCYTES NFR BLD: 0.81 K/UL (ref 1.1–3.7)
LYMPHOCYTES RELATIVE PERCENT: 21 % (ref 24–43)
MCH RBC QN AUTO: 30.5 PG (ref 25.2–33.5)
MCHC RBC AUTO-ENTMCNC: 32.7 G/DL (ref 28.4–34.8)
MCV RBC AUTO: 93.3 FL (ref 82.6–102.9)
MONOCYTES NFR BLD: 0.36 K/UL (ref 0.1–1.2)
MONOCYTES NFR BLD: 9 % (ref 3–12)
NEUTROPHILS NFR BLD: 64 % (ref 36–65)
NEUTS SEG NFR BLD: 2.46 K/UL (ref 1.5–8.1)
NRBC BLD-RTO: 0 PER 100 WBC
PLATELET # BLD AUTO: 174 K/UL (ref 138–453)
PMV BLD AUTO: 9.2 FL (ref 8.1–13.5)
POTASSIUM SERPL-SCNC: 4.1 MMOL/L (ref 3.7–5.3)
PROT SERPL-MCNC: 6.3 G/DL (ref 6.4–8.3)
RBC # BLD AUTO: 4.03 M/UL (ref 4.21–5.77)
SODIUM SERPL-SCNC: 142 MMOL/L (ref 135–144)
TRIGL SERPL-MCNC: 114 MG/DL
VLDLC SERPL CALC-MCNC: 23 MG/DL
WBC OTHER # BLD: 3.9 K/UL (ref 3.5–11.3)

## 2024-05-21 PROCEDURE — 36415 COLL VENOUS BLD VENIPUNCTURE: CPT

## 2024-05-21 PROCEDURE — 80061 LIPID PANEL: CPT

## 2024-05-21 PROCEDURE — 80053 COMPREHEN METABOLIC PANEL: CPT

## 2024-05-21 PROCEDURE — 85025 COMPLETE CBC W/AUTO DIFF WBC: CPT

## 2024-05-21 PROCEDURE — 83036 HEMOGLOBIN GLYCOSYLATED A1C: CPT

## 2024-05-22 SDOH — ECONOMIC STABILITY: FOOD INSECURITY: WITHIN THE PAST 12 MONTHS, YOU WORRIED THAT YOUR FOOD WOULD RUN OUT BEFORE YOU GOT MONEY TO BUY MORE.: NEVER TRUE

## 2024-05-22 SDOH — ECONOMIC STABILITY: FOOD INSECURITY: WITHIN THE PAST 12 MONTHS, THE FOOD YOU BOUGHT JUST DIDN'T LAST AND YOU DIDN'T HAVE MONEY TO GET MORE.: NEVER TRUE

## 2024-05-22 SDOH — ECONOMIC STABILITY: INCOME INSECURITY: HOW HARD IS IT FOR YOU TO PAY FOR THE VERY BASICS LIKE FOOD, HOUSING, MEDICAL CARE, AND HEATING?: NOT HARD AT ALL

## 2024-05-23 ENCOUNTER — OFFICE VISIT (OUTPATIENT)
Dept: PRIMARY CARE CLINIC | Age: 64
End: 2024-05-23
Payer: COMMERCIAL

## 2024-05-23 VITALS
SYSTOLIC BLOOD PRESSURE: 120 MMHG | DIASTOLIC BLOOD PRESSURE: 56 MMHG | HEART RATE: 80 BPM | WEIGHT: 199.2 LBS | HEIGHT: 69 IN | BODY MASS INDEX: 29.51 KG/M2 | RESPIRATION RATE: 20 BRPM

## 2024-05-23 DIAGNOSIS — E11.69 TYPE 2 DIABETES MELLITUS WITH OTHER SPECIFIED COMPLICATION, WITHOUT LONG-TERM CURRENT USE OF INSULIN (HCC): Primary | ICD-10-CM

## 2024-05-23 DIAGNOSIS — Z12.11 ENCOUNTER FOR COLONOSCOPY DUE TO HISTORY OF COLON CANCER: ICD-10-CM

## 2024-05-23 DIAGNOSIS — G25.81 RESTLESS LEG SYNDROME: ICD-10-CM

## 2024-05-23 DIAGNOSIS — E78.1 HYPERTRIGLYCERIDEMIA: ICD-10-CM

## 2024-05-23 DIAGNOSIS — Z85.038 ENCOUNTER FOR COLONOSCOPY DUE TO HISTORY OF COLON CANCER: ICD-10-CM

## 2024-05-23 DIAGNOSIS — M62.838 SPASM OF MUSCLE: ICD-10-CM

## 2024-05-23 DIAGNOSIS — I10 PRIMARY HYPERTENSION: ICD-10-CM

## 2024-05-23 PROCEDURE — 3078F DIAST BP <80 MM HG: CPT | Performed by: STUDENT IN AN ORGANIZED HEALTH CARE EDUCATION/TRAINING PROGRAM

## 2024-05-23 PROCEDURE — 1123F ACP DISCUSS/DSCN MKR DOCD: CPT | Performed by: STUDENT IN AN ORGANIZED HEALTH CARE EDUCATION/TRAINING PROGRAM

## 2024-05-23 PROCEDURE — G2211 COMPLEX E/M VISIT ADD ON: HCPCS | Performed by: STUDENT IN AN ORGANIZED HEALTH CARE EDUCATION/TRAINING PROGRAM

## 2024-05-23 PROCEDURE — 99214 OFFICE O/P EST MOD 30 MIN: CPT | Performed by: STUDENT IN AN ORGANIZED HEALTH CARE EDUCATION/TRAINING PROGRAM

## 2024-05-23 PROCEDURE — 3074F SYST BP LT 130 MM HG: CPT | Performed by: STUDENT IN AN ORGANIZED HEALTH CARE EDUCATION/TRAINING PROGRAM

## 2024-05-23 PROCEDURE — 3044F HG A1C LEVEL LT 7.0%: CPT | Performed by: STUDENT IN AN ORGANIZED HEALTH CARE EDUCATION/TRAINING PROGRAM

## 2024-05-23 RX ORDER — GINSENG 100 MG
CAPSULE ORAL
COMMUNITY
Start: 2024-01-01

## 2024-05-23 RX ORDER — MULTIVITAMIN WITH IRON
TABLET ORAL
COMMUNITY
Start: 2024-01-01

## 2024-05-23 RX ORDER — ROPINIROLE 0.25 MG/1
0.25 TABLET, FILM COATED ORAL 3 TIMES DAILY
Qty: 90 TABLET | Refills: 0 | Status: SHIPPED | OUTPATIENT
Start: 2024-05-23 | End: 2024-06-22

## 2024-05-23 RX ORDER — CYCLOBENZAPRINE HCL 10 MG
10 TABLET ORAL 3 TIMES DAILY PRN
Qty: 42 TABLET | Refills: 0 | Status: SHIPPED | OUTPATIENT
Start: 2024-05-23 | End: 2024-06-06

## 2024-05-23 RX ORDER — PRAMIPEXOLE DIHYDROCHLORIDE 1.5 MG/1
1.5 TABLET ORAL NIGHTLY
Qty: 90 TABLET | Refills: 0 | Status: CANCELLED | OUTPATIENT
Start: 2024-05-23

## 2024-05-23 SDOH — ECONOMIC STABILITY: FOOD INSECURITY: WITHIN THE PAST 12 MONTHS, THE FOOD YOU BOUGHT JUST DIDN'T LAST AND YOU DIDN'T HAVE MONEY TO GET MORE.: NEVER TRUE

## 2024-05-23 SDOH — ECONOMIC STABILITY: FOOD INSECURITY: WITHIN THE PAST 12 MONTHS, YOU WORRIED THAT YOUR FOOD WOULD RUN OUT BEFORE YOU GOT MONEY TO BUY MORE.: NEVER TRUE

## 2024-05-23 SDOH — ECONOMIC STABILITY: INCOME INSECURITY: HOW HARD IS IT FOR YOU TO PAY FOR THE VERY BASICS LIKE FOOD, HOUSING, MEDICAL CARE, AND HEATING?: NOT HARD AT ALL

## 2024-05-23 NOTE — PROGRESS NOTES
[facility] PRIMARY CARE  [address] [place] [address] , [address]  [place], [place], [address]    Zack Burris Sr. is a 64 y.o. male with  has a past medical history of Arthritis, CAD (coronary artery disease), Cancer ([facility]), Carpal tunnel syndrome, Chronic back pain, Depression, Diabetes mellitus ([facility]), Gastroesophageal reflux disease, Hearing loss, Hyperlipidemia, Hypertension, Movement disorder, Obesity, and Restless legs syndrome.  Presented to the office today for:  Chief Complaint   Patient presents with    Diabetes    Hypertension    Hyperlipidemia    Restless Legs       Assessment/Plan   1. Type 2 diabetes mellitus with other specified complication, without long-term current use of insulin ([facility])  2. Primary hypertension  3. Hypertriglyceridemia  4. Restless leg syndrome  -     rOPINIRole (REQUIP) 0.25 MG tablet; Take 1 tablet by mouth 3 times daily, Disp-90 tablet, R-0Normal  5. Encounter for colonoscopy due to history of colon cancer  -     [facility]  6. Spasm of muscle  -     cyclobenzaprine (FLEXERIL) 10 MG tablet; Take 1 tablet by mouth 3 times daily as needed for Muscle spasms, Disp-42 tablet, R-0Normal  Return in about 3 months (around 8/23/2024) for F/U Med Management.    T2DM - Continue w/ diet/exercise at this time, no meds per patient, RBA discussed today, A1c at goal 6.1  HTN - continue w/ meds at the current doses, of toprol at 25mg BID, lisinopril at 20mg, lasix 40mg  Continue w/ statin at the current dose of crestor 40mg  F/U with Specialists per prior plans  Alternative Requip, patient will check in 1 week's time.  Flexeril PRN for muscle spasm.  Discussed obtaining ferritin levels next, patient will supplement for now.     All patient questions answered.  Pt voiced understanding.   Medications Discontinued During This Encounter   Medication Reason    pramipexole (MIRAPEX) 1.5 MG tablet LIST CLEANUP       Patient received counseling on the following healthy behaviors:

## 2024-05-29 ENCOUNTER — TELEPHONE (OUTPATIENT)
Dept: GASTROENTEROLOGY | Age: 64
End: 2024-05-29

## 2024-06-18 ENCOUNTER — TELEPHONE (OUTPATIENT)
Dept: PRIMARY CARE CLINIC | Age: 64
End: 2024-06-18

## 2024-06-18 NOTE — TELEPHONE ENCOUNTER
Patient called stating the medications that were given to him for his restless legs aren't working. States its actually worse and he is not getting a lot of sleep. States he hasn't been feeling right since starting it, like brain fog. Is there something else he can try?

## 2024-06-18 NOTE — TELEPHONE ENCOUNTER
Hello, I need more information/specific which medication, what dose, and how long he tried it, thank you.  Electronically signed by Reji Resendiz MD on 6/18/2024 at 10:30 AM

## 2024-06-19 RX ORDER — PRAMIPEXOLE DIHYDROCHLORIDE 0.12 MG/1
0.12 TABLET ORAL NIGHTLY
Qty: 30 TABLET | Refills: 0 | Status: SHIPPED | OUTPATIENT
Start: 2024-06-19 | End: 2024-07-19

## 2024-06-19 NOTE — TELEPHONE ENCOUNTER
Patient has been taking the requip, Patient has been taking it twice a day. Patient and family say his demeanor has changed and he has a lot of brain fog. Patient has been taking it for about 3 weeks. Patient wondering if there is something else he can try.

## 2024-06-27 ENCOUNTER — TELEPHONE (OUTPATIENT)
Dept: PRIMARY CARE CLINIC | Age: 64
End: 2024-06-27

## 2024-06-27 RX ORDER — PRAMIPEXOLE 1.5 MG/1
1.5 TABLET, EXTENDED RELEASE ORAL DAILY
Qty: 90 TABLET | Refills: 0 | Status: SHIPPED | OUTPATIENT
Start: 2024-06-27

## 2024-06-27 NOTE — TELEPHONE ENCOUNTER
Patient called in stating that he was sent over 0.125 of Mirapex to the pharmacy and he has previously taken 1.5. He was unsure if that was his new dosage or if he was supposed to continue with that dose, please advise.

## 2024-07-10 ENCOUNTER — OFFICE VISIT (OUTPATIENT)
Dept: ONCOLOGY | Age: 64
End: 2024-07-10
Payer: COMMERCIAL

## 2024-07-10 ENCOUNTER — TELEPHONE (OUTPATIENT)
Dept: PRIMARY CARE CLINIC | Age: 64
End: 2024-07-10

## 2024-07-10 VITALS
HEART RATE: 73 BPM | SYSTOLIC BLOOD PRESSURE: 131 MMHG | RESPIRATION RATE: 18 BRPM | DIASTOLIC BLOOD PRESSURE: 63 MMHG | HEIGHT: 69 IN | BODY MASS INDEX: 28.14 KG/M2 | WEIGHT: 190 LBS | TEMPERATURE: 97.8 F

## 2024-07-10 DIAGNOSIS — Z85.21 HISTORY OF LARYNGEAL CANCER: ICD-10-CM

## 2024-07-10 DIAGNOSIS — G89.29 CHRONIC PAIN OF BOTH SHOULDERS: Primary | ICD-10-CM

## 2024-07-10 DIAGNOSIS — M25.512 CHRONIC PAIN OF BOTH SHOULDERS: Primary | ICD-10-CM

## 2024-07-10 DIAGNOSIS — M25.511 CHRONIC PAIN OF BOTH SHOULDERS: Primary | ICD-10-CM

## 2024-07-10 DIAGNOSIS — D64.9 NORMOCYTIC ANEMIA: Primary | ICD-10-CM

## 2024-07-10 PROCEDURE — 3078F DIAST BP <80 MM HG: CPT | Performed by: INTERNAL MEDICINE

## 2024-07-10 PROCEDURE — 3075F SYST BP GE 130 - 139MM HG: CPT | Performed by: INTERNAL MEDICINE

## 2024-07-10 PROCEDURE — 1123F ACP DISCUSS/DSCN MKR DOCD: CPT | Performed by: INTERNAL MEDICINE

## 2024-07-10 PROCEDURE — 99213 OFFICE O/P EST LOW 20 MIN: CPT | Performed by: INTERNAL MEDICINE

## 2024-07-10 NOTE — TELEPHONE ENCOUNTER
Patient stopped in stating her needs a referral for his right shoulder. States he has talked to you about it before. Had surgery in December, but caught it on a door 2 months ago and its still not better. [facility]. Ok to place order?

## 2024-07-10 NOTE — PROGRESS NOTES
_        Chief Complaint   Patient presents with    Follow-up     Anemia F/U      DIAGNOSIS:         Normocytic anemia, likely of chronic illness  History of laryngeal cancer in 2013 status post chemoradiation.  In remission.  Ex-smoker.  CURRENT THERAPY:         Plan observation and surveillance.  BRIEF CASE HISTORY:      Mr. Zack Burris is a very pleasant 64 y.o. male with history of multiple co morbidities as listed.  Patient is referred for evaluation and further management of anemia.  Patient gives history of laryngeal cancer status post chemoradiation in 2013.  He is in remission.  He continued to have scarring from radiation.  Patient had multiple surgeries for different problems over the years.  He had labs which showed normocytic anemia.  Patient denies any active bleeding.  No melena or hematochezia.  No hematemesis.  He has mild weakness and fatigue.  No dizziness.  No palpitation.  No shortness of breath on exertion.  Patient had EGD which was negative.  Biopsy from small intestine was negative.  Last colonoscopy was 4 years ago which was negative.  Patient quit smoking 2008..   Workup for anemia was essentially negative without identifiable cause.  His anemia was mild and hemoglobin running between 11 and 12 so we decided that he might have anemia of chronic illness likely secondary to his diabetes and we decided to observe  INTERIM HISTORY:   Patient seen for follow-up anemia.  He is using some probiotics and he is losing significant amount of weight.  He is feeling much better and hemoglobin has been stable.    PAST MEDICAL HISTORY: has a past medical history of Arthritis, CAD (coronary artery disease), Cancer (HCC), Carpal tunnel syndrome, Chronic back pain, Depression, Diabetes mellitus (HCC), Gastroesophageal reflux disease, Hearing loss, Hyperlipidemia, Hypertension, Movement disorder, Obesity, and  Subjective        Maribel Aguayo is a 34 y.o. female.     Chief Complaint   Patient presents with    Anxiety     6 month f/u       History of Present Illness  Patient is here for management of her chronic medical problems: anxiety, allergies and migraines.    Migraines she is taking sumatriptan 50 mg  as needed. Has changed she reports. Now has ocular migraines.     Anxiety: seroquel 50 mg nightly. She is not suicidal or homicidal. 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  She feels more related to her job.     Allergies she reports causes makes migraines worse some day. Taking flonase daily and loratadine 10 mg daily.    History of thyroiditis in past; she is uninsured she was never on medications. 10 years ago. She was evalutated then.    Iron def she reports gets checked when she donates platelets. She is checked then.   Iron level was 35 recently.            The following portions of the patient's history were reviewed and updated as appropriate: allergies, current medications, past family history, past medical history, past social history, past surgical history and problem list.      Current Outpatient Medications:     fluticasone (FLONASE) 50 MCG/ACT nasal spray, FLONASE 50 MCG/ACT NASAL SUSPENSION, Disp: , Rfl:     Iron-Vitamin C (VITRON-C PO), Take  by mouth., Disp: , Rfl:     loratadine (CLARITIN) 10 MG tablet, Take 1 tablet by mouth Daily., Disp: , Rfl:     multivitamin with minerals tablet tablet, Take 1 tablet by mouth Daily., Disp: , Rfl:     QUEtiapine (SEROquel) 50 MG tablet, Take 1 tablet by mouth Every Night., Disp: 90 tablet, Rfl: 1    SUMAtriptan (Imitrex) 50 MG tablet, Take one tablet at onset of headache. May repeat dose one time in 2 hours if headache not relieved., Disp: 20 tablet, Rfl: 0    No results found for this or any previous visit (from the past 4032 hour(s)).      Review of Systems    Objective     /77 (BP Location: Left arm, Patient  "Position: Sitting, Cuff Size: Large Adult)   Pulse 81   Temp 98.1 °F (36.7 °C) (Infrared)   Ht 162.6 cm (64\")   Wt 98.9 kg (218 lb)   SpO2 99%   BMI 37.42 kg/m²     Physical Exam  Vitals and nursing note reviewed.   Constitutional:       Appearance: She is obese.   HENT:      Head: Normocephalic.      Right Ear: External ear normal.      Left Ear: External ear normal.      Nose: Nose normal.      Mouth/Throat:      Mouth: Mucous membranes are moist.   Eyes:      Conjunctiva/sclera: Conjunctivae normal.      Pupils: Pupils are equal, round, and reactive to light.   Neck:      Thyroid: Thyroid tenderness present.        Comments: Mildly tender right thyroid. Slighly enlarged mango thyroid with palpation  Cardiovascular:      Rate and Rhythm: Normal rate and regular rhythm.      Pulses: Normal pulses.      Heart sounds: Normal heart sounds.   Pulmonary:      Breath sounds: Normal breath sounds.   Abdominal:      General: Bowel sounds are normal.      Palpations: Abdomen is soft.   Musculoskeletal:      Cervical back: Neck supple.   Lymphadenopathy:      Cervical:      Right cervical: No superficial, deep or posterior cervical adenopathy.     Left cervical: No superficial, deep or posterior cervical adenopathy.   Skin:     General: Skin is warm.      Capillary Refill: Capillary refill takes less than 2 seconds.   Neurological:      General: No focal deficit present.      Mental Status: She is alert and oriented to person, place, and time.   Psychiatric:         Mood and Affect: Mood normal.         Behavior: Behavior normal.         Thought Content: Thought content normal.         Judgment: Judgment normal.       Result Review :                Assessment & Plan    Diagnoses and all orders for this visit:    1. Other migraine without status migrainosus, not intractable (Primary)  Comments:  stable    2. Iron deficiency anemia, unspecified iron deficiency anemia type  Comments:  followed by labs thru her platelet " donation    3. Anxiety  Comments:  stable    Other orders  -     QUEtiapine (SEROquel) 50 MG tablet; Take 1 tablet by mouth Every Night.  Dispense: 90 tablet; Refill: 1      Patient Instructions   If continued chest discomfort go to the ED for evalaution  Should have thyroid evaluated with labs and ultrasound  If you feel suicidal or homicidal sign into nearest ED.   ED can evaluate thyroid also.  Eat healthy exercise.     Follow Up   Return in about 6 months (around 12/13/2023).    Patient was given instructions and counseling regarding her condition or for health maintenance advice. Please see specific information pulled into the AVS if appropriate.     Annabelle Lozano, APRN    06/13/23

## 2024-07-11 ENCOUNTER — TELEPHONE (OUTPATIENT)
Dept: PRIMARY CARE CLINIC | Age: 64
End: 2024-07-11

## 2024-07-11 DIAGNOSIS — M25.512 CHRONIC PAIN OF BOTH SHOULDERS: Primary | ICD-10-CM

## 2024-07-11 DIAGNOSIS — G89.29 CHRONIC PAIN OF BOTH SHOULDERS: Primary | ICD-10-CM

## 2024-07-11 DIAGNOSIS — M25.511 CHRONIC PAIN OF BOTH SHOULDERS: Primary | ICD-10-CM

## 2024-07-11 NOTE — TELEPHONE ENCOUNTER
[facility] called and asked to patient orders switched from PT to OT therapy. Order changed.

## 2024-07-16 ENCOUNTER — HOSPITAL ENCOUNTER (OUTPATIENT)
Age: 64
Discharge: HOME | End: 2024-07-16
Payer: COMMERCIAL

## 2024-07-16 ENCOUNTER — OFFICE VISIT (OUTPATIENT)
Dept: PRIMARY CARE CLINIC | Age: 64
End: 2024-07-16
Payer: COMMERCIAL

## 2024-07-16 ENCOUNTER — HOSPITAL ENCOUNTER (OUTPATIENT)
Dept: GENERAL RADIOLOGY | Age: 64
Discharge: HOME | End: 2024-07-18
Payer: COMMERCIAL

## 2024-07-16 ENCOUNTER — HOSPITAL ENCOUNTER (OUTPATIENT)
Age: 64
Discharge: HOME | End: 2024-07-18
Payer: COMMERCIAL

## 2024-07-16 VITALS
WEIGHT: 189 LBS | BODY MASS INDEX: 27.99 KG/M2 | DIASTOLIC BLOOD PRESSURE: 64 MMHG | TEMPERATURE: 98.1 F | OXYGEN SATURATION: 95 % | SYSTOLIC BLOOD PRESSURE: 112 MMHG | HEIGHT: 69 IN | HEART RATE: 84 BPM

## 2024-07-16 DIAGNOSIS — R42 DIZZINESS: ICD-10-CM

## 2024-07-16 DIAGNOSIS — R50.9 FEVER, UNSPECIFIED FEVER CAUSE: ICD-10-CM

## 2024-07-16 DIAGNOSIS — R10.9 ABDOMINAL DISCOMFORT: ICD-10-CM

## 2024-07-16 DIAGNOSIS — R50.9 FEVER, UNSPECIFIED FEVER CAUSE: Primary | ICD-10-CM

## 2024-07-16 DIAGNOSIS — R05.1 ACUTE COUGH: ICD-10-CM

## 2024-07-16 LAB
ALBUMIN SERPL-MCNC: 4.1 G/DL (ref 3.5–5.2)
ALBUMIN/GLOB SERPL: 1.3 {RATIO} (ref 1–2.5)
ALP SERPL-CCNC: 72 U/L (ref 40–129)
ALT SERPL-CCNC: 25 U/L (ref 5–41)
ANION GAP SERPL CALCULATED.3IONS-SCNC: 11 MMOL/L (ref 9–17)
AST SERPL-CCNC: 19 U/L
BASOPHILS # BLD: <0.03 K/UL (ref 0–0.2)
BASOPHILS NFR BLD: 0 % (ref 0–2)
BILIRUB SERPL-MCNC: 0.4 MG/DL (ref 0.3–1.2)
BILIRUB UR QL STRIP: NEGATIVE
BUN SERPL-MCNC: 20 MG/DL (ref 8–23)
BUN/CREAT SERPL: 29 (ref 9–20)
CALCIUM SERPL-MCNC: 9.2 MG/DL (ref 8.6–10.4)
CHLORIDE SERPL-SCNC: 104 MMOL/L (ref 98–107)
CLARITY UR: CLEAR
CO2 SERPL-SCNC: 25 MMOL/L (ref 20–31)
COLOR UR: YELLOW
CREAT SERPL-MCNC: 0.7 MG/DL (ref 0.7–1.2)
CRP SERPL HS-MCNC: 85.4 MG/L (ref 0–5)
EOSINOPHIL # BLD: 0.09 K/UL (ref 0–0.44)
EOSINOPHILS RELATIVE PERCENT: 2 % (ref 1–4)
EPI CELLS #/AREA URNS HPF: NORMAL /HPF (ref 0–5)
ERYTHROCYTE [DISTWIDTH] IN BLOOD BY AUTOMATED COUNT: 12.3 % (ref 11.8–14.4)
GFR, ESTIMATED: >90 ML/MIN/1.73M2
GLUCOSE SERPL-MCNC: 110 MG/DL (ref 70–99)
GLUCOSE UR STRIP-MCNC: NEGATIVE MG/DL
HCT VFR BLD AUTO: 38.4 % (ref 40.7–50.3)
HGB BLD-MCNC: 12.7 G/DL (ref 13–17)
HGB UR QL STRIP.AUTO: NEGATIVE
IMM GRANULOCYTES # BLD AUTO: <0.03 K/UL (ref 0–0.3)
IMM GRANULOCYTES NFR BLD: 0 %
KETONES UR STRIP-MCNC: NEGATIVE MG/DL
LEUKOCYTE ESTERASE UR QL STRIP: NEGATIVE
LYMPHOCYTES NFR BLD: 0.56 K/UL (ref 1.1–3.7)
LYMPHOCYTES RELATIVE PERCENT: 11 % (ref 24–43)
MCH RBC QN AUTO: 30.2 PG (ref 25.2–33.5)
MCHC RBC AUTO-ENTMCNC: 33.1 G/DL (ref 28.4–34.8)
MCV RBC AUTO: 91.4 FL (ref 82.6–102.9)
MONOCYTES NFR BLD: 0.44 K/UL (ref 0.1–1.2)
MONOCYTES NFR BLD: 8 % (ref 3–12)
NEUTROPHILS NFR BLD: 79 % (ref 36–65)
NEUTS SEG NFR BLD: 4.15 K/UL (ref 1.5–8.1)
NITRITE UR QL STRIP: NEGATIVE
NRBC BLD-RTO: 0 PER 100 WBC
PH UR STRIP: 6 [PH] (ref 5–9)
PLATELET # BLD AUTO: 282 K/UL (ref 138–453)
PMV BLD AUTO: 8.6 FL (ref 8.1–13.5)
POTASSIUM SERPL-SCNC: 4.5 MMOL/L (ref 3.7–5.3)
PROT SERPL-MCNC: 7.3 G/DL (ref 6.4–8.3)
PROT UR STRIP-MCNC: NEGATIVE MG/DL
RBC # BLD AUTO: 4.2 M/UL (ref 4.21–5.77)
RBC #/AREA URNS HPF: NORMAL /HPF (ref 0–2)
SODIUM SERPL-SCNC: 140 MMOL/L (ref 135–144)
SP GR UR STRIP: 1.02 (ref 1.01–1.02)
UROBILINOGEN UR STRIP-ACNC: NORMAL EU/DL (ref 0–1)
WBC #/AREA URNS HPF: NORMAL /HPF (ref 0–5)
WBC OTHER # BLD: 5.3 K/UL (ref 3.5–11.3)

## 2024-07-16 PROCEDURE — 85025 COMPLETE CBC W/AUTO DIFF WBC: CPT

## 2024-07-16 PROCEDURE — 3078F DIAST BP <80 MM HG: CPT | Performed by: STUDENT IN AN ORGANIZED HEALTH CARE EDUCATION/TRAINING PROGRAM

## 2024-07-16 PROCEDURE — 36415 COLL VENOUS BLD VENIPUNCTURE: CPT

## 2024-07-16 PROCEDURE — 87086 URINE CULTURE/COLONY COUNT: CPT

## 2024-07-16 PROCEDURE — 3074F SYST BP LT 130 MM HG: CPT | Performed by: STUDENT IN AN ORGANIZED HEALTH CARE EDUCATION/TRAINING PROGRAM

## 2024-07-16 PROCEDURE — 71046 X-RAY EXAM CHEST 2 VIEWS: CPT

## 2024-07-16 PROCEDURE — 81001 URINALYSIS AUTO W/SCOPE: CPT

## 2024-07-16 PROCEDURE — 86140 C-REACTIVE PROTEIN: CPT

## 2024-07-16 PROCEDURE — 99214 OFFICE O/P EST MOD 30 MIN: CPT | Performed by: STUDENT IN AN ORGANIZED HEALTH CARE EDUCATION/TRAINING PROGRAM

## 2024-07-16 PROCEDURE — 80053 COMPREHEN METABOLIC PANEL: CPT

## 2024-07-16 PROCEDURE — G2211 COMPLEX E/M VISIT ADD ON: HCPCS | Performed by: STUDENT IN AN ORGANIZED HEALTH CARE EDUCATION/TRAINING PROGRAM

## 2024-07-16 PROCEDURE — 87040 BLOOD CULTURE FOR BACTERIA: CPT

## 2024-07-16 PROCEDURE — 1123F ACP DISCUSS/DSCN MKR DOCD: CPT | Performed by: STUDENT IN AN ORGANIZED HEALTH CARE EDUCATION/TRAINING PROGRAM

## 2024-07-16 RX ORDER — AZITHROMYCIN 250 MG/1
TABLET, FILM COATED ORAL
Qty: 6 TABLET | Refills: 0 | Status: SHIPPED | OUTPATIENT
Start: 2024-07-16 | End: 2024-07-26

## 2024-07-16 RX ORDER — METHYLPREDNISOLONE 4 MG/1
TABLET ORAL
Qty: 1 KIT | Refills: 0 | Status: SHIPPED | OUTPATIENT
Start: 2024-07-16 | End: 2024-07-22

## 2024-07-16 NOTE — PROGRESS NOTES
[facility] PRIMARY CARE  [address] [place] [address] , [address]  [place], [place], [address]    Zack Burris Sr. is a 64 y.o. male with  has a past medical history of Arthritis, CAD (coronary artery disease), Cancer (HCC), Carpal tunnel syndrome, Chronic back pain, Depression, Diabetes mellitus ([facility]), Gastroesophageal reflux disease, Hearing loss, Hyperlipidemia, Hypertension, Movement disorder, Obesity, and Restless legs syndrome.  Presented to the office today for:  Chief Complaint   Patient presents with    Extremity Weakness    Headache       Assessment/Plan   1. Fever, unspecified fever cause [R50.9]  -     CBC with Auto Differential; Future  -     Comprehensive Metabolic Panel; Future  -     C-Reactive Protein; Future  -     Culture, Blood 1; Future  -     Culture, Blood 2; Future  -     XR CHEST STANDARD (2 VW); Future  -     Urinalysis with Reflex to Culture; Future  -     Culture, Urine; Future  2. Dizziness  -     CBC with Auto Differential; Future  -     Comprehensive Metabolic Panel; Future  -     C-Reactive Protein; Future  -     Culture, Blood 1; Future  -     Culture, Blood 2; Future  3. Acute cough  -     XR CHEST STANDARD (2 VW); Future  4. Abdominal discomfort  -     Urinalysis with Reflex to Culture; Future  -     Culture, Urine; Future  Return if symptoms worsen or fail to improve.    Obtain labs, blood cultures, CXR, possible atypical PNA  Medrol dose pack for wheezing  Close follow-up was advised.     All patient questions answered.  Pt voiced understanding.   There are no discontinued medications.    Patient received counseling on the following healthy behaviors: nutrition, exercise and medication adherence. I encouraged and discussed lifestyle modifications including diet and exercise (150+ minutes of moderate-high intensity) and the patient was agreeable to making positive/beneficial changes to both to help improve their overall health. Discussed use, benefit, and side effects of

## 2024-07-17 ENCOUNTER — HOSPITAL ENCOUNTER (OUTPATIENT)
Dept: OCCUPATIONAL THERAPY | Age: 64
Setting detail: THERAPIES SERIES
Discharge: HOME | End: 2024-07-17
Payer: COMMERCIAL

## 2024-07-17 LAB
MICROORGANISM SPEC CULT: NO GROWTH
SERVICE CMNT-IMP: NORMAL
SPECIMEN DESCRIPTION: NORMAL

## 2024-07-17 PROCEDURE — 97166 OT EVAL MOD COMPLEX 45 MIN: CPT

## 2024-07-17 PROCEDURE — 97110 THERAPEUTIC EXERCISES: CPT

## 2024-07-17 PROCEDURE — 97014 ELECTRIC STIMULATION THERAPY: CPT

## 2024-07-17 NOTE — PLAN OF CARE
Phone: [phone]                      [facility]           Fax: [phone]                           Outpatient Occupational Therapy                                                                            Initial Evaluation      Name:  Zack Suarezmomaurice Sr. YOONO.B.: [birth year]  Date: 2024  Referring Physician: Reji Resendiz MD   Medical Diagnosis: M25.511 & M25.512 - Chonic pain of B shoulders  Rehab Diagnosis/ICD-10#s:Weakness, M62.81  Insurance: OT Insurance Information: [facility]  Total # of Visits to Date: 1  Next  Appointment: Dr. Resendiz - 24  Chief Complaint: R shoulder pain  [facility] #: [id number]    Onset of Injury/Condition: 6/10/24Onset Date: 06/10/24    Mechanism of Injury: Jerking stretch of RUE   Surgery Date: N/A    Precautions:   [x]None  [] Fall Risk  []WB Status  [] Pacemaker   []Other:            Involved Extremity:      [] Left [x] Right  Dominant: [] Left [x]Right    Work Status:   [x] Normal [] Restricted [] Off D/T Injury/Condition [] Retired [] Unemployed [] Disabled []Other:  Critical Job/Daily Tasks:     Subjective:  Patient presents with OT order for eval/tx for B shoulder pain. Patient was recently seen at this facility  s/p R rotator cuff repair on 23. Patient states that initial injury happened in  and he has not had any therapy or treatment. Patient noted to be having issued at work and became cumbersome to complete tasks. Patient elected for surgical intervention to improve work safety and quality. Patient was discharged in  full ROM and goals met. Patient states then on Julieth 10, he went to close the car door and wind caught door jerking shoulder. Patient now with increased pain and discomfort in shoulder. Also states L shoulder pain.      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        Pain: Intensity:   7-8/10 Location:   B shoulders, R>L  Pain Type: [] Constant [x]

## 2024-07-21 LAB
MICROORGANISM SPEC CULT: NORMAL
MICROORGANISM SPEC CULT: NORMAL
SERVICE CMNT-IMP: NORMAL
SERVICE CMNT-IMP: NORMAL
SPECIMEN DESCRIPTION: NORMAL
SPECIMEN DESCRIPTION: NORMAL

## 2024-07-22 ENCOUNTER — HOSPITAL ENCOUNTER (OUTPATIENT)
Dept: OCCUPATIONAL THERAPY | Age: 64
Setting detail: THERAPIES SERIES
Discharge: HOME | End: 2024-07-22
Payer: COMMERCIAL

## 2024-07-22 PROCEDURE — 97014 ELECTRIC STIMULATION THERAPY: CPT

## 2024-07-22 PROCEDURE — 97110 THERAPEUTIC EXERCISES: CPT

## 2024-07-22 NOTE — PROGRESS NOTES
Occupational Therapy  Phone: [phone]                 [facility]    Fax: [phone]                       Outpatient Occupational Therapy                 DAILY TREATMENT NOTE    Date: 7/22/2024  Patient’s Name:  Zack Burris Sr.  YOB: 1960 (64 y.o.)  Gender:  male  MRN:  [id number]  [facility] #: [id number]  Medical Diagnosis: M25.511 & M25.512 - Chonic pain of B shoulders    Referring Physician: Reji Resendiz MD     INSURANCE  OT Insurance Information: [employer]       Total # of Visits to Date: 2       PAIN  [x]No     []Yes      Location:   Pain Rating (0-10 pain scale):   Pain Description:     SUBJECTIVE   \"It hurts when I put on my belt.It only hurts when I do specific things.\"    Flow Sheet   Exercise /   Manual treatment Weight/  Level Reps/Time Comments    UB Bike x 6'  3 forward 3 back for warm up    pulleys x 3 min each  Flex/ABD seated, pain in RADHA shoulders with ABD    Towel stretch x x10 IR/ER stretch    Scap clocks 5# X 20  4 planes standing    5 way blue X 20 5 planes standing                                                                                                                  Modality Flow Sheet:   START STOP Tx Modality    10' Electrical Stim: IFC sweep to R shoulder to pt tolerance. Pt tolerated well with skin intact pre and post. With CP       Ultrasound: ___ W/cm2 x ___ mins  Duty factor: __100%  __50%  __20% __10%  Head size:   MHz: __1mHz __2 mHz  __3mHz  Location:     Hot Pack:     Paraffin:    10' Cold Pack: RADHA shoulders post tx for pain/inflammation     Dry Needling: ___\" needle to superficial radial, deep radial and  lataeral antebrachial cutaneous at homeostatic neuro- trigger points to...   ___No manipulation/static  ___Basic Manipulation  ___Pistoning Manipulation  ___Needle Rotation  ___Tenting           GOALS   Time Frame for Long Term Goals : 6 weeks     Long Term Goal 1: Patient to improve MMT B shoulder to 5/5 to improve strength for I/ADL. continue

## 2024-07-26 ENCOUNTER — HOSPITAL ENCOUNTER (OUTPATIENT)
Dept: OCCUPATIONAL THERAPY | Age: 64
Setting detail: THERAPIES SERIES
Discharge: HOME | End: 2024-07-26
Payer: COMMERCIAL

## 2024-07-26 NOTE — PROGRESS NOTES
Occupational Therapy  Phone: [phone]                 [facility]    Fax: [phone]                       Outpatient Occupational Therapy                [facility]  Inpatient/Observation/Outpatient Rehabilitation    Date: 2024  Patient Name: Zack HENSON Fatuma Zuluaga       [x] Inpatient Acute/Observation       []  Outpatient  : 1960         [x] Pt no showed for scheduled appointment    [x] As a reminder, pt was contacted/attempted contact via phone of upcoming appointments.    [] Pt refused/declined therapy at this time due to:           [] Pt cancelled due to:  [] No Reason Given   [] Sick/ill   [] Other:    [] Evaluation held by RN/Provider due to:    [] High Heart Rate   [] High Blood Pressure   [] Orthopedic Consult   [] Hgb < 7   [] Other:    [] Pt does not require skilled services due to:        MJ Hutchinson Date: 2024

## 2024-07-29 ENCOUNTER — APPOINTMENT (OUTPATIENT)
Dept: OCCUPATIONAL THERAPY | Age: 64
End: 2024-07-29
Payer: COMMERCIAL

## 2024-07-31 ENCOUNTER — HOSPITAL ENCOUNTER (OUTPATIENT)
Dept: OCCUPATIONAL THERAPY | Age: 64
Setting detail: THERAPIES SERIES
Discharge: HOME | End: 2024-07-31
Payer: COMMERCIAL

## 2024-08-05 ENCOUNTER — HOSPITAL ENCOUNTER (OUTPATIENT)
Dept: OCCUPATIONAL THERAPY | Age: 64
Setting detail: THERAPIES SERIES
Discharge: HOME | End: 2024-08-05
Payer: COMMERCIAL

## 2024-08-05 PROCEDURE — 97110 THERAPEUTIC EXERCISES: CPT

## 2024-08-05 PROCEDURE — 97014 ELECTRIC STIMULATION THERAPY: CPT

## 2024-08-05 NOTE — PROGRESS NOTES
Phone: [phone]                 [facility]    Fax: [phone]                       Outpatient Occupational Therapy                 DAILY TREATMENT NOTE    Date: 8/5/2024  Patient’s Name:  Zack Burris Sr.  YOB: 1960 (64 y.o.)  Gender:  male  MRN:  [id number]  [facility] #: [id number]  Medical Diagnosis: M25.511 & M25.512 - Chonic pain of B shoulders    Referring Physician: Reji Resendiz MD     INSURANCE  OT Insurance Information: BCBS       Total # of Visits to Date: 4       PAIN  [x]No     []Yes      Location:   Pain Rating (0-10 pain scale):   Pain Description:     SUBJECTIVE   Patient states slight pain with certain movements.         Flow Sheet   Exercise /   Manual treatment Weight/  Level Reps/Time Comments    UB Bike x 6'  3 forward 3 back for warm up    pulleys x 3 min each  Flex/ABD seated, pain in RADHA shoulders with ABD    Towel stretch   IR/ER stretch    Scap clocks 5# X 20  4 planes standing    5 way   5 planes standing   [place] x   x15 Diagonals and lat pull downs for strengthening                                                                                                                                                Modality Flow Sheet:   START STOP Tx Modality     10' Electrical Stim: IFC sweep to R shoulder to pt tolerance. Pt tolerated well with skin intact pre and post. With CP          Ultrasound: ___ W/cm2 x ___ mins  Duty factor: __100%  __50%  __20% __10%  Head size:   MHz: __1mHz __2 mHz  __3mHz  Location:       Hot Pack:       Paraffin:     10' Cold Pack: RADHA shoulders post tx for pain/inflammation       Dry Needling: ___\" needle to superficial radial, deep radial and  lataeral antebrachial cutaneous at homeostatic neuro- trigger points to...   ___No manipulation/static  ___Basic Manipulation  ___Pistoning Manipulation  ___Needle Rotation  ___Tenting                    GOALS   Time Frame for Long Term Goals : 6 weeks       Long Term Goal 1: Patient to improve

## 2024-08-07 ENCOUNTER — HOSPITAL ENCOUNTER (OUTPATIENT)
Dept: OCCUPATIONAL THERAPY | Age: 64
Setting detail: THERAPIES SERIES
Discharge: HOME | End: 2024-08-07
Payer: COMMERCIAL

## 2024-08-08 NOTE — PROGRESS NOTES
[facility]  Inpatient/Observation/Outpatient Rehabilitation    Date: 2024  Patient Name: Zack HENSON Fatuma Brewster.       [] Inpatient Acute/Observation       [x]  Outpatient  : 1960         [x] Pt no showed for scheduled appointment    [x] As a reminder, pt was contacted/attempted contact via phone of upcoming appointments.    [] Pt refused/declined therapy at this time due to:           [] Pt cancelled due to:  [] No Reason Given   [] Sick/ill   [] Other:        Dionne Marreor OTR/PAUL Date: 2024

## 2024-08-12 ENCOUNTER — HOSPITAL ENCOUNTER (OUTPATIENT)
Dept: OCCUPATIONAL THERAPY | Age: 64
Setting detail: THERAPIES SERIES
Discharge: HOME | End: 2024-08-12
Payer: COMMERCIAL

## 2024-08-12 PROCEDURE — 97014 ELECTRIC STIMULATION THERAPY: CPT

## 2024-08-12 PROCEDURE — 97110 THERAPEUTIC EXERCISES: CPT

## 2024-08-12 NOTE — PROGRESS NOTES
Phone: [phone]                 [facility]    Fax: [phone]                       Outpatient Occupational Therapy                 DAILY TREATMENT NOTE    Date: 8/12/2024  Patient’s Name:  Zack Burris Sr.  YOB: 1960 (64 y.o.)  Gender:  male  MRN:  [id number]  CSN #: [id number]  Medical Diagnosis: M25.511 & M25.512 - Chonic pain of B shoulders    Referring Physician: Reji Resendiz MD     INSURANCE  OT Insurance Information: BCBS       Total # of Visits to Date: 5       PAIN  [x]No     []Yes      Location:   Pain Rating (0-10 pain scale):   Pain Description:     SUBJECTIVE   Patient states that his shoulders felt really good all weekend      Flow Sheet   Exercise /   Manual treatment Weight/  Level Reps/Time Comments    UB Bike x 6'  3 forward 3 back for warm up    pulleys x 3 min each  Flex/ABD seated, pain in RADHA shoulders with ABD    Towel stretch     IR/ER stretch    Scap clocks 5# X 20  4 planes standing    5 way  blue  x20 5 planes standing   [place] x   x15 Diagonals and lat pull downs for strengthening                                                                                                                                                                 Modality Flow Sheet:   START STOP Tx Modality     10' Electrical Stim: IFC sweep to R shoulder to pt tolerance. Pt tolerated well with skin intact pre and post. With CP          Ultrasound: ___ W/cm2 x ___ mins  Duty factor: __100%  __50%  __20% __10%  Head size:   MHz: __1mHz __2 mHz  __3mHz  Location:       Hot Pack:       Paraffin:     10' Cold Pack: R shoulder post tx for pain/inflammation paired with estim       Dry Needling: ___\" needle to superficial radial, deep radial and  lataeral antebrachial cutaneous at homeostatic neuro- trigger points to...   ___No manipulation/static  ___Basic Manipulation  ___Pistoning Manipulation  ___Needle Rotation  ___Tenting                       GOALS   Time Frame for Long Term Goals

## 2024-08-19 ENCOUNTER — HOSPITAL ENCOUNTER (OUTPATIENT)
Dept: OCCUPATIONAL THERAPY | Age: 64
Setting detail: THERAPIES SERIES
Discharge: HOME | End: 2024-08-19
Payer: COMMERCIAL

## 2024-08-19 PROCEDURE — 97014 ELECTRIC STIMULATION THERAPY: CPT

## 2024-08-19 PROCEDURE — 97110 THERAPEUTIC EXERCISES: CPT

## 2024-08-19 NOTE — PROGRESS NOTES
Phone: [phone]                 [facility]    Fax: [phone]                       Outpatient Occupational Therapy                 DAILY TREATMENT NOTE    Date: 8/19/2024  Patient’s Name:  Zack Burris Sr.  YOB: 1960 (64 y.o.)  Gender:  male  MRN:  [id number]  CSN #: [id number]  Medical Diagnosis: M25.511 & M25.512 - Chonic pain of B shoulders    Referring Physician: Reji Resendiz MD     INSURANCE  OT Insurance Information: BCBS       Total # of Visits to Date: 6       PAIN  [x]No     []Yes      Location:   Pain Rating (0-10 pain scale):   Pain Description:     SUBJECTIVE   Patient states that he fell over the weekend and hurt his elbow. Reports increased pain s/p ex, stim completed c CP.      Flow Sheet   Exercise /   Manual treatment Weight/  Level Reps/Time Comments    UB Bike x 6'  3 forward 3 back for warm up    pulleys x 3 min each  Flex/ABD seated, pain in RADHA shoulders with ABD    Towel stretch     IR/ER stretch    Scap clocks    4 planes standing    5 way  blue  x20 5 planes standing   [place] x   x15 Diagonals for strengthening                                                                                                                                                                 Modality Flow Sheet:   START STOP Tx Modality     10' Electrical Stim: IFC sweep to R shoulder to pt tolerance. Pt tolerated well with skin intact pre and post. With CP          Ultrasound: ___ W/cm2 x ___ mins  Duty factor: __100%  __50%  __20% __10%  Head size:   MHz: __1mHz __2 mHz  __3mHz  Location:       Hot Pack:       Paraffin:      Cold Pack: R shoulder post tx for pain/inflammation paired with estim       Dry Needling: ___\" needle to superficial radial, deep radial and  lataeral antebrachial cutaneous at homeostatic neuro- trigger points to...   ___No manipulation/static  ___Basic Manipulation  ___Pistoning Manipulation  ___Needle Rotation  ___Tenting                       GOALS   Time

## 2024-08-21 ENCOUNTER — APPOINTMENT (OUTPATIENT)
Dept: OCCUPATIONAL THERAPY | Age: 64
End: 2024-08-21
Payer: COMMERCIAL

## 2024-08-28 ENCOUNTER — HOSPITAL ENCOUNTER (OUTPATIENT)
Dept: OCCUPATIONAL THERAPY | Age: 64
Setting detail: THERAPIES SERIES
Discharge: HOME | End: 2024-08-28
Payer: COMMERCIAL

## 2024-08-28 PROCEDURE — 97014 ELECTRIC STIMULATION THERAPY: CPT

## 2024-08-28 PROCEDURE — 97110 THERAPEUTIC EXERCISES: CPT

## 2024-08-28 NOTE — PROGRESS NOTES
Occupational Therapy  Phone: [phone]                 [facility]    Fax: [phone]                       Outpatient Occupational Therapy                 DAILY TREATMENT NOTE    Date: 8/28/2024  Patient’s Name:  Zack Burris Sr.  YOB: 1960 (64 y.o.)  Gender:  male  MRN:  [id number]  CSN #: [id number]  Medical Diagnosis: M25.511 & M25.512 - Chonic pain of B shoulders    Referring Physician: Reji Resendiz MD     INSURANCE  OT Insurance Information: BCBS       Total # of Visits to Date: 7       PAIN  [x]No     []Yes      Location:   Pain Rating (0-10 pain scale):   Pain Description:     SUBJECTIVE   Pt reports he was \"doing a young alfonso work\" and is a little sore all over. Last appt next Wednesday.        Flow Sheet   Exercise /   Manual treatment Weight/  Level Reps/Time Comments    UB Bike x 6'  3 forward 3 back for warm up    pulleys x 3 min each  Flex/ABD seated, pain in RADHA shoulders with ABD    Towel stretch     IR/ER stretch    Scap clocks     4 planes standing    5 way  blue  x20 5 planes standing   [place] x   x20 Diagonals for strengthening                                                                                                                                                                 Modality Flow Sheet:   START STOP Tx Modality     10' Electrical Stim: IFC sweep to R shoulder to pt tolerance. Pt tolerated well with skin intact pre and post. With CP          Ultrasound: ___ W/cm2 x ___ mins  Duty factor: __100%  __50%  __20% __10%  Head size:   MHz: __1mHz __2 mHz  __3mHz  Location:       Hot Pack:       Paraffin:       Cold Pack: R shoulder post tx for pain/inflammation paired with estim       Dry Needling: ___\" needle to superficial radial, deep radial and  lataeral antebrachial cutaneous at homeostatic neuro- trigger points to...   ___No manipulation/static  ___Basic Manipulation  ___Pistoning Manipulation  ___Needle Rotation  ___Tenting

## 2024-08-29 ENCOUNTER — OFFICE VISIT (OUTPATIENT)
Dept: PRIMARY CARE CLINIC | Age: 64
End: 2024-08-29

## 2024-08-29 VITALS
RESPIRATION RATE: 18 BRPM | WEIGHT: 189 LBS | DIASTOLIC BLOOD PRESSURE: 58 MMHG | SYSTOLIC BLOOD PRESSURE: 100 MMHG | HEART RATE: 60 BPM | BODY MASS INDEX: 27.99 KG/M2 | HEIGHT: 69 IN

## 2024-08-29 DIAGNOSIS — E78.1 HYPERTRIGLYCERIDEMIA: ICD-10-CM

## 2024-08-29 DIAGNOSIS — E11.69 TYPE 2 DIABETES MELLITUS WITH OTHER SPECIFIED COMPLICATION, WITHOUT LONG-TERM CURRENT USE OF INSULIN (HCC): ICD-10-CM

## 2024-08-29 DIAGNOSIS — N40.0 BENIGN PROSTATIC HYPERPLASIA, UNSPECIFIED WHETHER LOWER URINARY TRACT SYMPTOMS PRESENT: ICD-10-CM

## 2024-08-29 DIAGNOSIS — G25.81 RESTLESS LEG SYNDROME: ICD-10-CM

## 2024-08-29 DIAGNOSIS — Z12.11 ENCOUNTER FOR SCREENING FOR MALIGNANT NEOPLASM OF COLON: ICD-10-CM

## 2024-08-29 DIAGNOSIS — M62.838 MUSCLE SPASM: Primary | ICD-10-CM

## 2024-08-29 RX ORDER — TAMSULOSIN HYDROCHLORIDE 0.4 MG/1
0.4 CAPSULE ORAL DAILY
Qty: 90 CAPSULE | Refills: 0 | Status: SHIPPED | OUTPATIENT
Start: 2024-08-29

## 2024-08-29 NOTE — PROGRESS NOTES
[facility] PRIMARY [facility]ARE  [address] [place] [address] , [address]  [place], [place], [address]    Zack Burris Sr. is a 64 y.o. male with  has a past medical history of Arthritis, CAD (coronary artery disease), Cancer (HCC), Carpal tunnel syndrome, Chronic back pain, Depression, Diabetes mellitus ([facility]), Gastroesophageal reflux disease, Hearing loss, Hyperlipidemia, Hypertension, Movement disorder, Obesity, and Restless legs syndrome.  Presented to the office today for:  Chief Complaint   Patient presents with    Diabetes    Leg Pain    Hyperlipidemia       Assessment/Plan   1. Muscle spasm  -     tiZANidine (ZANAFLEX) 4 MG tablet; Take 1 tablet by mouth 3 times daily as needed (muscle spasm), Disp-42 tablet, R-0Normal  2. Type 2 diabetes mellitus with other specified complication, without long-term current use of insulin (HCC)  3. Restless leg syndrome  -     rotigotine (NEUPRO) 1 MG/24HR PT24; Place 1 patch onto the skin daily, Disp-30 patch, R-0Normal  -     External Referral To Neurology  4. Hypertriglyceridemia  5. Benign prostatic hyperplasia, unspecified whether lower urinary tract symptoms present  -     tamsulosin (FLOMAX) 0.4 MG capsule; Take 1 capsule by mouth daily, Disp-90 capsule, R-0Normal  6. Encounter for screening for malignant neoplasm of colon  -     Rubin Hoover MD, General Surgery, [facility] in about 3 months (around 11/29/2024) for F/U Med Management.    T2DM - Continue w/ diet/exercise at this time, no meds per patient, RBA discussed today, A1c at goal 6.1  HTN - continue w/ meds at the current doses, of toprol at 25mg BID, lisinopril at 20mg, lasix 40mg  Continue w/ statin at the current dose of crestor 40mg  F/U with Specialists per prior plans  Alternative Rotigotine   Neurology consult re: Restless Legs, muscle spasms  Suspected BPH/ Flomax    Last PDMP Kurt as Reviewed:  Review User Review Instant Review Result   RENATO MALIK 8/29/2024  3:59 PM Reviewed PDMP [1]     respiratory distress. Patient has no wheezes.   Abdominal: Soft. Bowel sounds are normal. Patient exhibits no distension. There is no tenderness.   Musculoskeletal:  Patient exhibits no edema and tenderness. Patient exhibits no deformity.   Neurological: Patient is alert and oriented to person, place, and time.   Skin: Skin is warm and dry. Patient is not diaphoretic.   Psychiatric: Patient's speech is normal and behavior is normal. Thought content normal.   Vitals reviewed.    Lab Results   Component Value Date    WBC 5.3 07/16/2024    HGB 12.7 (L) 07/16/2024    HCT 38.4 (L) 07/16/2024     07/16/2024    CHOL 127 05/21/2024    TRIG 114 05/21/2024    HDL 43 05/21/2024    LDLDIRECT 90 02/25/2023    ALT 25 07/16/2024    AST 19 07/16/2024     07/16/2024    K 4.5 07/16/2024     07/16/2024    CREATININE 0.7 07/16/2024    BUN 20 07/16/2024    CO2 25 07/16/2024    TSH 3.01 04/19/2022    LABA1C 6.1 (H) 05/21/2024     Lab Results   Component Value Date    CALCIUM 9.2 07/16/2024     Lab Results   Component Value Date    LDLDIRECT 90 02/25/2023       Please note that this chart was generated using voice recognition Dragon dictation software. Although every effort was made to ensure the accuracy of this automated transcription, some errors in transcription may have occurred.    Electronically signed by Dr. Reji Resendiz MD on 8/29/2024 at 4:06 PM

## 2024-09-04 ENCOUNTER — HOSPITAL ENCOUNTER (OUTPATIENT)
Dept: OCCUPATIONAL THERAPY | Age: 64
Setting detail: THERAPIES SERIES
Discharge: HOME | End: 2024-09-04

## 2024-09-05 NOTE — PROGRESS NOTES
Occupational Therapy  [facility]  Inpatient/Observation/Outpatient Rehabilitation    Date: 2024  Patient Name: Zack HENSON Fatuma Brewster.       [] Inpatient Acute/Observation       [x]  Outpatient  : 1960       [x] Pt no showed for scheduled appointment    [x] As a reminder, pt was contacted/attempted contact via phone of upcoming appointments. Message left.     [] Pt refused/declined therapy at this time due to:           [] Pt cancelled due to:  [] No Reason Given   [] Sick/ill   [] Other:    [] Evaluation held by RN/Provider due to:    [] High Heart Rate   [] High Blood Pressure   [] Orthopedic Consult   [] Hgb < 7   [] Other:      This was pt's last scheduled appt. Will call in 2-3 weeks for f/u if we don't hear from pt before.    MJ Hutchinson Date: 2024

## 2024-09-22 ENCOUNTER — HOSPITAL ENCOUNTER (EMERGENCY)
Age: 64
Discharge: HOME | End: 2024-09-22
Attending: EMERGENCY MEDICINE
Payer: COMMERCIAL

## 2024-09-22 ENCOUNTER — APPOINTMENT (OUTPATIENT)
Dept: GENERAL RADIOLOGY | Age: 64
End: 2024-09-22
Payer: COMMERCIAL

## 2024-09-22 VITALS
RESPIRATION RATE: 16 BRPM | WEIGHT: 192 LBS | DIASTOLIC BLOOD PRESSURE: 52 MMHG | SYSTOLIC BLOOD PRESSURE: 90 MMHG | OXYGEN SATURATION: 98 % | HEART RATE: 63 BPM | TEMPERATURE: 97.3 F | BODY MASS INDEX: 28.44 KG/M2 | HEIGHT: 69 IN

## 2024-09-22 DIAGNOSIS — S92.502A CLOSED FRACTURE OF PHALANX OF LEFT SECOND TOE, INITIAL ENCOUNTER: Primary | ICD-10-CM

## 2024-09-22 PROCEDURE — 99283 EMERGENCY DEPT VISIT LOW MDM: CPT

## 2024-09-22 PROCEDURE — 73630 X-RAY EXAM OF FOOT: CPT

## 2024-09-22 ASSESSMENT — PAIN SCALES - GENERAL
PAINLEVEL_OUTOF10: 10
PAINLEVEL_OUTOF10: 10

## 2024-09-22 ASSESSMENT — PAIN DESCRIPTION - LOCATION
LOCATION: TOE (COMMENT WHICH ONE);FOOT
LOCATION: TOE (COMMENT WHICH ONE)

## 2024-09-22 ASSESSMENT — PAIN - FUNCTIONAL ASSESSMENT
PAIN_FUNCTIONAL_ASSESSMENT: 0-10
PAIN_FUNCTIONAL_ASSESSMENT: 0-10

## 2024-09-22 ASSESSMENT — PAIN DESCRIPTION - DESCRIPTORS: DESCRIPTORS: SHARP

## 2024-09-22 ASSESSMENT — PAIN DESCRIPTION - ORIENTATION
ORIENTATION: LEFT
ORIENTATION: LEFT

## 2024-09-22 ASSESSMENT — PAIN DESCRIPTION - PAIN TYPE
TYPE: ACUTE PAIN
TYPE: ACUTE PAIN

## 2024-09-22 ASSESSMENT — PAIN DESCRIPTION - ONSET: ONSET: SUDDEN

## 2024-09-22 ASSESSMENT — LIFESTYLE VARIABLES
HOW OFTEN DO YOU HAVE A DRINK CONTAINING ALCOHOL: NEVER
HOW MANY STANDARD DRINKS CONTAINING ALCOHOL DO YOU HAVE ON A TYPICAL DAY: PATIENT DOES NOT DRINK

## 2024-09-22 ASSESSMENT — PAIN DESCRIPTION - FREQUENCY: FREQUENCY: CONTINUOUS

## 2024-09-26 ENCOUNTER — TELEPHONE (OUTPATIENT)
Dept: PRIMARY CARE CLINIC | Age: 64
End: 2024-09-26

## 2024-09-28 PROBLEM — Z12.11 ENCOUNTER FOR SCREENING FOR MALIGNANT NEOPLASM OF COLON: Status: RESOLVED | Noted: 2024-08-29 | Resolved: 2024-09-28

## 2024-10-29 ENCOUNTER — TELEPHONE (OUTPATIENT)
Dept: PRIMARY CARE CLINIC | Age: 64
End: 2024-10-29

## 2024-10-29 NOTE — TELEPHONE ENCOUNTER
Patient called in requesting we fax over his medication list to the [facility] sleep lab. Medication list faxed.

## 2024-10-31 DIAGNOSIS — I10 PRIMARY HYPERTENSION: ICD-10-CM

## 2024-10-31 RX ORDER — FUROSEMIDE 40 MG/1
40 TABLET ORAL DAILY
Qty: 90 TABLET | Refills: 1 | Status: SHIPPED | OUTPATIENT
Start: 2024-10-31

## 2024-10-31 RX ORDER — METOPROLOL SUCCINATE 50 MG/1
25 TABLET, EXTENDED RELEASE ORAL 2 TIMES DAILY
Qty: 90 TABLET | Refills: 1 | Status: SHIPPED | OUTPATIENT
Start: 2024-10-31

## 2024-11-03 ENCOUNTER — HOSPITAL ENCOUNTER (EMERGENCY)
Age: 64
Discharge: HOME | End: 2024-11-03
Attending: EMERGENCY MEDICINE
Payer: COMMERCIAL

## 2024-11-03 VITALS
RESPIRATION RATE: 16 BRPM | OXYGEN SATURATION: 96 % | BODY MASS INDEX: 28.14 KG/M2 | DIASTOLIC BLOOD PRESSURE: 59 MMHG | HEART RATE: 74 BPM | TEMPERATURE: 97.4 F | HEIGHT: 69 IN | WEIGHT: 190 LBS | SYSTOLIC BLOOD PRESSURE: 115 MMHG

## 2024-11-03 DIAGNOSIS — T14.8XXA SPLINTER: Primary | ICD-10-CM

## 2024-11-03 PROCEDURE — 99283 EMERGENCY DEPT VISIT LOW MDM: CPT

## 2024-11-03 RX ORDER — CEPHALEXIN 500 MG/1
500 CAPSULE ORAL 3 TIMES DAILY
Qty: 15 CAPSULE | Refills: 0 | Status: SHIPPED | OUTPATIENT
Start: 2024-11-03 | End: 2024-11-08

## 2024-11-03 RX ORDER — CEPHALEXIN 500 MG/1
500 CAPSULE ORAL ONCE
Status: DISCONTINUED | OUTPATIENT
Start: 2024-11-03 | End: 2024-11-04 | Stop reason: HOSPADM

## 2024-11-04 ENCOUNTER — TELEPHONE (OUTPATIENT)
Age: 64
End: 2024-11-04

## 2024-11-04 NOTE — DISCHARGE INSTRUCTIONS
Wait until the nail grows out for the next few days before trying to take the splinter out again.  Your body is naturally going to expel the splinter out over the next few days.  Watch for signs of infection.  Take the antibiotic as directed.

## 2024-11-04 NOTE — ED PROVIDER NOTES
[facility] ED  EMERGENCY DEPARTMENT ENCOUNTER      Pt Name: Zack Burris Sr.  MRN: [id number]  Birthdate 1960  Date of evaluation: 11/3/2024  Provider: Nhi Garcia DO    CHIEF COMPLAINT       Chief Complaint   Patient presents with    Foreign Body in Skin     Foreign body under nail in 3rd digit of right hand.  Patient was moving wood and got a piece of wood under his nail this evening.         HISTORY OF PRESENT ILLNESS   (Location/Symptom, Timing/Onset, Context/Setting, Quality, Duration, Modifying Factors, Severity)  Note limiting factors.   Zack Burris Sr. is a 64 y.o. male who presents to the emergency department with a splinter underneath his third digit nail that happened around 6 PM last night.  Patient states that he was moving wood and got a piece of it underneath his nail.  He has been trying to take it out at home but has been unsuccessful.    REVIEW OF SYSTEMS    (2-9 systems for level 4, 10 or more for level 5)     Review of Systems   Constitutional:  Negative for fatigue and fever.   HENT:  Negative for congestion and sore throat.    Eyes:  Negative for visual disturbance.   Respiratory:  Negative for shortness of breath.    Cardiovascular:  Negative for chest pain and palpitations.   Gastrointestinal:  Negative for abdominal distention.   Genitourinary:  Negative for dysuria.   Musculoskeletal:  Negative for back pain.        Splinter underneath the third digit nail.   Skin:  Negative for rash.   Psychiatric/Behavioral:  Negative for suicidal ideas.        Except as noted above the remainder of the review of systems was reviewed and negative.       PAST MEDICAL HISTORY     Past Medical History:   Diagnosis Date    Arthritis     CAD (coronary artery disease)     Cancer ([facility]) 01/02/2013    Tonsil    Carpal tunnel syndrome 02/09/2018    Chronic back pain 01/01/1985    Depression 05/20/1988    Diabetes mellitus ([facility]) 04/20/2022    Gastroesophageal reflux disease 04/20/2022

## 2024-11-04 NOTE — TELEPHONE ENCOUNTER
[facility] ED Follow up Call    Reason for ED visit:  splinter under nail     11/4/2024     Darren Dixon , this is  from Reji Hu's office, just calling to see how you are doing after your recent ED visit.    Did you receive discharge instructions?  Yes  Do you understand the discharge instructions? Yes  Did the ED give you any new prescriptions? No: n/a  Were you able to fill your prescriptions? No: n/a      Do you have one of our red, yellow and green  Zone sheets that help you to determine when you should go to the ED?  Not Applicable      Do you need or want to make a follow up appt with your PCP?  No    Do you have any further needs in the home, e.g. equipment?  No        FU appts/Provider:    Future Appointments   Date Time Provider Department Center   12/4/2024  4:00 PM Reji Resendiz MD [facility]   12/12/2024  3:30 PM Penny Kang, ANNMARIE - CNP TIFF  [facility]         VOICEMAIL DOCUMENTATION - ERASE IF NOT USED  Hi, this message is for Zack.  This is Tiffany Zurita MA from Reji Jacome office.  Just calling to see how you are doing after your recent visit to the Emergency Room.  Reji Jacome wants to make sure you were able to fill any prescriptions and that you understand your discharge instructions.  Please return our call if you need to make a follow up appointment with your provider or have any further needs.   Our phone number is [phone].  Have a great day.

## 2024-11-11 ENCOUNTER — HOSPITAL ENCOUNTER (OUTPATIENT)
Age: 64
Discharge: HOME | End: 2024-11-11
Payer: COMMERCIAL

## 2024-11-11 LAB
FERRITIN SERPL-MCNC: 155 NG/ML
IRON SATN MFR SERPL: 21 % (ref 20–55)
IRON SERPL-MCNC: 69 UG/DL (ref 61–157)
TIBC SERPL-MCNC: 325 UG/DL (ref 250–450)
UNSATURATED IRON BINDING CAPACITY: 256 UG/DL (ref 112–347)

## 2024-11-11 PROCEDURE — 82728 ASSAY OF FERRITIN: CPT

## 2024-11-11 PROCEDURE — 83550 IRON BINDING TEST: CPT

## 2024-11-11 PROCEDURE — 36415 COLL VENOUS BLD VENIPUNCTURE: CPT

## 2024-11-11 PROCEDURE — 83540 ASSAY OF IRON: CPT

## 2024-12-04 ENCOUNTER — OFFICE VISIT (OUTPATIENT)
Dept: PRIMARY CARE CLINIC | Age: 64
End: 2024-12-04

## 2024-12-04 VITALS
DIASTOLIC BLOOD PRESSURE: 72 MMHG | BODY MASS INDEX: 28.29 KG/M2 | WEIGHT: 191 LBS | HEIGHT: 69 IN | SYSTOLIC BLOOD PRESSURE: 138 MMHG

## 2024-12-04 DIAGNOSIS — M79.673 CHRONIC FOOT PAIN, UNSPECIFIED LATERALITY: ICD-10-CM

## 2024-12-04 DIAGNOSIS — I10 PRIMARY HYPERTENSION: ICD-10-CM

## 2024-12-04 DIAGNOSIS — G89.29 CHRONIC FOOT PAIN, UNSPECIFIED LATERALITY: ICD-10-CM

## 2024-12-04 DIAGNOSIS — C32.9 LARYNGEAL CANCER (HCC): ICD-10-CM

## 2024-12-04 DIAGNOSIS — G62.9 NEUROPATHY: ICD-10-CM

## 2024-12-04 DIAGNOSIS — Z12.5 SCREENING PSA (PROSTATE SPECIFIC ANTIGEN): ICD-10-CM

## 2024-12-04 DIAGNOSIS — E78.1 HYPERTRIGLYCERIDEMIA: ICD-10-CM

## 2024-12-04 DIAGNOSIS — E11.69 TYPE 2 DIABETES MELLITUS WITH OTHER SPECIFIED COMPLICATION, WITHOUT LONG-TERM CURRENT USE OF INSULIN (HCC): Primary | ICD-10-CM

## 2024-12-04 PROBLEM — K37 APPENDICITIS: Status: RESOLVED | Noted: 2021-08-07 | Resolved: 2024-12-04

## 2024-12-04 PROBLEM — L29.9 ITCHING: Status: RESOLVED | Noted: 2022-05-03 | Resolved: 2024-12-04

## 2024-12-04 RX ORDER — METOPROLOL SUCCINATE 25 MG/1
25 TABLET, EXTENDED RELEASE ORAL 2 TIMES DAILY
Qty: 180 TABLET | Refills: 1 | Status: SHIPPED | OUTPATIENT
Start: 2024-12-04

## 2024-12-04 RX ORDER — PREGABALIN 75 MG/1
75 CAPSULE ORAL 2 TIMES DAILY
Qty: 60 CAPSULE | Refills: 0 | Status: SHIPPED | OUTPATIENT
Start: 2024-12-04 | End: 2025-01-03

## 2024-12-04 RX ORDER — PREDNISONE 20 MG/1
20 TABLET ORAL 2 TIMES DAILY
Qty: 20 TABLET | Refills: 0 | Status: SHIPPED | OUTPATIENT
Start: 2024-12-04 | End: 2024-12-14

## 2024-12-04 RX ORDER — AMITRIPTYLINE HYDROCHLORIDE 10 MG/1
10 TABLET ORAL NIGHTLY
Qty: 90 TABLET | Refills: 0 | Status: SHIPPED | OUTPATIENT
Start: 2024-12-04

## 2024-12-04 NOTE — PROGRESS NOTES
confusion and suicidal ideas.     Objective:    /72   Ht 1.753 m (5' 9\")   Wt 86.6 kg (191 lb)   BMI 28.21 kg/m²    BP Readings from Last 3 Encounters:   12/04/24 138/72   11/03/24 (!) 115/59   09/22/24 (!) 90/52     Physical Exam  Physical Exam    Constitutional: Patient is oriented to person, place, and time. Patient appears well-developed and well-nourished. No distress.   HENT: Head: Normocephalic and atraumatic.   Eyes: Pupils are equal, round, and reactive to light. Conjunctivae are normal. Right eye exhibits no discharge. Left eye exhibits no discharge.   Cardiovascular: Normal rate, regular rhythm and normal heart sounds.   Pulmonary/Chest: Effort normal and breath sounds normal. No respiratory distress. Patient has no wheezes.   Abdominal: Soft. Bowel sounds are normal. Patient exhibits no distension. There is no tenderness.   Musculoskeletal:  Patient exhibits no edema and tenderness. Patient exhibits no deformity.   Neurological: Patient is alert and oriented to person, place, and time.   Skin: Skin is warm and dry. Patient is not diaphoretic.   Abnormal toenails/hammer toe.  Psychiatric: Patient's speech is normal and behavior is normal. Thought content normal.   Vitals reviewed.      Lab Results   Component Value Date    WBC 5.3 07/16/2024    HGB 12.7 (L) 07/16/2024    HCT 38.4 (L) 07/16/2024     07/16/2024    CHOL 127 05/21/2024    TRIG 114 05/21/2024    HDL 43 05/21/2024    LDLDIRECT 90 02/25/2023    ALT 25 07/16/2024    AST 19 07/16/2024     07/16/2024    K 4.5 07/16/2024     07/16/2024    CREATININE 0.7 07/16/2024    BUN 20 07/16/2024    CO2 25 07/16/2024    TSH 3.01 04/19/2022    LABA1C 6.1 (H) 05/21/2024     Lab Results   Component Value Date    CALCIUM 9.2 07/16/2024     Lab Results   Component Value Date    LDLDIRECT 90 02/25/2023       Please note that this chart was generated using voice recognition Dragon dictation software. Although every effort was made to ensure

## 2024-12-12 ENCOUNTER — OFFICE VISIT (OUTPATIENT)
Dept: GASTROENTEROLOGY | Age: 64
End: 2024-12-12
Payer: COMMERCIAL

## 2024-12-12 VITALS
WEIGHT: 195 LBS | DIASTOLIC BLOOD PRESSURE: 64 MMHG | BODY MASS INDEX: 28.8 KG/M2 | RESPIRATION RATE: 18 BRPM | HEART RATE: 76 BPM | SYSTOLIC BLOOD PRESSURE: 136 MMHG

## 2024-12-12 DIAGNOSIS — K21.9 GASTROESOPHAGEAL REFLUX DISEASE, UNSPECIFIED WHETHER ESOPHAGITIS PRESENT: Primary | ICD-10-CM

## 2024-12-12 DIAGNOSIS — R63.4 UNINTENTIONAL WEIGHT LOSS: ICD-10-CM

## 2024-12-12 DIAGNOSIS — K21.9 CHRONIC GERD: Primary | ICD-10-CM

## 2024-12-12 DIAGNOSIS — Z01.818 PRE-OP TESTING: ICD-10-CM

## 2024-12-12 DIAGNOSIS — D64.9 NORMOCYTIC ANEMIA: ICD-10-CM

## 2024-12-12 DIAGNOSIS — Z80.0 FAMILY HISTORY OF COLON CANCER IN FATHER: ICD-10-CM

## 2024-12-12 PROCEDURE — 99213 OFFICE O/P EST LOW 20 MIN: CPT | Performed by: NURSE PRACTITIONER

## 2024-12-12 PROCEDURE — 3078F DIAST BP <80 MM HG: CPT | Performed by: NURSE PRACTITIONER

## 2024-12-12 PROCEDURE — 3075F SYST BP GE 130 - 139MM HG: CPT | Performed by: NURSE PRACTITIONER

## 2024-12-12 PROCEDURE — 1123F ACP DISCUSS/DSCN MKR DOCD: CPT | Performed by: NURSE PRACTITIONER

## 2024-12-12 ASSESSMENT — ENCOUNTER SYMPTOMS
ALLERGIC/IMMUNOLOGIC NEGATIVE: 1
SHORTNESS OF BREATH: 0
CHEST TIGHTNESS: 0
ABDOMINAL PAIN: 0
GASTROINTESTINAL NEGATIVE: 1
STRIDOR: 0
ANAL BLEEDING: 0
WHEEZING: 0
BLOOD IN STOOL: 0
TROUBLE SWALLOWING: 0
COUGH: 0

## 2024-12-12 NOTE — PROGRESS NOTES
first (baseline) CT or an annual exam?->Annual  Is this a low dose CT or a routine CT?->Low Dose CT  Smoking Status?->Former  Date quit smoking? (must be within 15 years)->1/1/08  Smoking packs per day?->2  Years smoking?->40    FINDINGS:  Mediastinum:  Thoracic aorta normal in course and caliber.  No enlarged  mediastinal or hilar lymph nodes by imaging size criteria.  Heart size within  normal limits.  Prior sternotomy and CABG.  Thyroid and esophagus  unremarkable as visualized.    Lungs/Pleura:  Stable emphysema.  No consolidation, pneumothorax or pleural  effusion.  Central airways are patent.    Stable centrally calcified granuloma medial left lower lobe.  No new nodules.    Upper Abdomen:  Limited visualization upper abdomen demonstrates hepatic  steatosis.  Similar contracted gallbladder otherwise unremarkable.    Soft Tissues/Bones: Spinal degenerative change otherwise unremarkable.    Impression  No suspicious pulmonary nodule    Stable emphysema.    Hepatic steatosis.    Contracted gallbladder.    LUNG RADS:  Lung-RADS 1 - Negative (v2022)    Management:  12 month screening LDCT    RECOMMENDATIONS:  If you would like to register your patient with the [facility] Lung Nodule/Lung  Cancer Screening Program, please contact the Nurse Navigator at  [phone]).    EGD 8/1/2023:  PREOPERATIVE DIAGNOSIS:  GERD  IMPRESSION:  Gastritis     -- Diagnosis --   A.  DUODENAL BULB, BIOPSY:   -DUODENAL MUCOSA WITH PROMINENT BRUNNER GLANDS AND FOCAL GASTRIC SURFACE CELL METAPLASIA.   -BACKGROUND NORMAL VILLOUS ARCHITECTURE WITH NO FEATURES OF CELIAC DISEASE.     B. GASTRIC ANTRUM, BIOPSY:   -GASTRIC ANTRAL MUCOSA WITH NO SIGNIFICANT INFLAMMATION.     C.  GE JUNCTION, BIOPSY:   -SQUAMOUS MUCOSA WITH MILD REFLUX-LIKE CHANGES.     Recommendations:  Repeat EGD 2 years (2025)     Colonoscopy September 21, 2021:  Findings:  The entire examined colon appeared normal.  Estimated Blood Loss: Estimated blood loss:

## 2024-12-12 NOTE — PATIENT INSTRUCTIONS
SURVEY:    You may be receiving a survey from [facility] your [facility] today.    You may get this in the mail, through your MyChart, or in your email.     Please complete the survey to enable us to provide the highest quality of care to you and your family.    If you cannot score us a very good (5 Stars) on any question, please call the office to discuss how we could of made your experience exceptional.    Thank you!    MD Dr. Gay Rojas, DO  Dr. Luigi Jc, DO    Dr. Abebe Minaya, DO    MD Penny Moreau, APRN-DAVIDSON Lucas, MICHELLE Ewing LPN Jena Adams, MA Emily Akers, MA    Phone: [phone]  Fax: [phone]    Office Hours:   M-TH 8-5, F: 8-12

## 2024-12-14 DIAGNOSIS — I10 PRIMARY HYPERTENSION: ICD-10-CM

## 2024-12-14 DIAGNOSIS — K21.9 GASTROESOPHAGEAL REFLUX DISEASE, UNSPECIFIED WHETHER ESOPHAGITIS PRESENT: ICD-10-CM

## 2024-12-14 DIAGNOSIS — E78.1 HYPERTRIGLYCERIDEMIA: ICD-10-CM

## 2024-12-14 DIAGNOSIS — N40.0 BENIGN PROSTATIC HYPERPLASIA, UNSPECIFIED WHETHER LOWER URINARY TRACT SYMPTOMS PRESENT: ICD-10-CM

## 2024-12-16 RX ORDER — ROSUVASTATIN CALCIUM 40 MG/1
40 TABLET, COATED ORAL DAILY
Qty: 90 TABLET | Refills: 1 | Status: SHIPPED | OUTPATIENT
Start: 2024-12-16

## 2024-12-16 RX ORDER — TAMSULOSIN HYDROCHLORIDE 0.4 MG/1
0.4 CAPSULE ORAL DAILY
Qty: 90 CAPSULE | Refills: 1 | Status: SHIPPED | OUTPATIENT
Start: 2024-12-16

## 2024-12-16 RX ORDER — LISINOPRIL 20 MG/1
20 TABLET ORAL DAILY
Qty: 90 TABLET | Refills: 1 | Status: SHIPPED | OUTPATIENT
Start: 2024-12-16

## 2024-12-16 RX ORDER — FENOFIBRATE 48 MG/1
48 TABLET, COATED ORAL DAILY
Qty: 90 TABLET | Refills: 1 | Status: SHIPPED | OUTPATIENT
Start: 2024-12-16

## 2025-01-03 PROBLEM — Z12.5 SCREENING PSA (PROSTATE SPECIFIC ANTIGEN): Status: RESOLVED | Noted: 2024-12-04 | Resolved: 2025-01-03

## 2025-01-18 ENCOUNTER — HOSPITAL ENCOUNTER (OUTPATIENT)
Age: 65
Discharge: HOME | End: 2025-01-18
Payer: COMMERCIAL

## 2025-01-18 DIAGNOSIS — E11.69 TYPE 2 DIABETES MELLITUS WITH OTHER SPECIFIED COMPLICATION, WITHOUT LONG-TERM CURRENT USE OF INSULIN (HCC): ICD-10-CM

## 2025-01-18 LAB
FOLATE SERPL-MCNC: 11 NG/ML (ref 4.8–24.2)
VIT B12 SERPL-MCNC: 396 PG/ML (ref 232–1245)

## 2025-01-18 PROCEDURE — 82746 ASSAY OF FOLIC ACID SERUM: CPT

## 2025-01-18 PROCEDURE — 36415 COLL VENOUS BLD VENIPUNCTURE: CPT

## 2025-01-18 PROCEDURE — 82607 VITAMIN B-12: CPT

## 2025-02-12 ENCOUNTER — OFFICE VISIT (OUTPATIENT)
Dept: VASCULAR SURGERY | Age: 65
End: 2025-02-12
Payer: COMMERCIAL

## 2025-02-12 VITALS
TEMPERATURE: 97.8 F | HEIGHT: 69 IN | BODY MASS INDEX: 28.73 KG/M2 | HEART RATE: 88 BPM | SYSTOLIC BLOOD PRESSURE: 118 MMHG | RESPIRATION RATE: 18 BRPM | DIASTOLIC BLOOD PRESSURE: 89 MMHG | WEIGHT: 194 LBS

## 2025-02-12 DIAGNOSIS — M79.605 PAIN IN BOTH LOWER EXTREMITIES: Primary | ICD-10-CM

## 2025-02-12 DIAGNOSIS — M79.604 PAIN IN BOTH LOWER EXTREMITIES: Primary | ICD-10-CM

## 2025-02-12 PROCEDURE — 3079F DIAST BP 80-89 MM HG: CPT | Performed by: INTERNAL MEDICINE

## 2025-02-12 PROCEDURE — 3074F SYST BP LT 130 MM HG: CPT | Performed by: INTERNAL MEDICINE

## 2025-02-12 PROCEDURE — 99205 OFFICE O/P NEW HI 60 MIN: CPT | Performed by: INTERNAL MEDICINE

## 2025-02-12 PROCEDURE — 1123F ACP DISCUSS/DSCN MKR DOCD: CPT | Performed by: INTERNAL MEDICINE

## 2025-02-12 RX ORDER — PRAMIPEXOLE DIHYDROCHLORIDE 1.5 MG/1
0.5 TABLET ORAL NIGHTLY
COMMUNITY

## 2025-02-12 NOTE — PROGRESS NOTES
Zack Burris Sr. was seen on 2/12/2025 for   Chief Complaint   Patient presents with    Leg Pain     New Patient-bilateral thigh pain-complains of thigh muscle cramping both thighs at night only. Also complains of foot pain. Ongoing for years-just \"can't take it anymore.\"   .                            REVIEW OF SYSTEMS    Constitutional Weight: absent, A, Fatigue: absent Fever: absent   HEENT Ears: normal,Visual disturbance: absent Sore throat: absent,    Respiratory Shortness of breath: present, Cough: absent;, Snoring: present   Cardivascular Chest pain: absent,  Leg pain: present,Leg swelling:absent, Non-healing wound:absent    GI Diarrhea: absent, Abdominal Pain: absent    Urinary frequency: absent, Urinary incontinence: absent   Musculoskeletal Neck pain: absent, Back pain: present, Restless Leg:present     Dermatological Hair loss: absent, Skin changes: absent   Neurological  Sudden Loss of Vision in one eye:absent, Slurred Speech:absent, Weakness on one side of body: absent,Headache: absent   Psychiatric Anxiety: present, Depression: present   Hematologic Abnormal bleeding: absent,          
capsule by mouth 2 times daily (Patient not taking: Reported on 12/12/2024) 60 capsule 0    ELDERBERRY PO Take by mouth (Patient not taking: Reported on 2/12/2025)       No current facility-administered medications for this visit.         Physical findings:  General- no acute distress, oriented  HEENT - no xanthelasma, external ears normal  Neck- Supple, no thyromegaly  Carotids - no carotid bruits  Lungs - Clear to auscultation.  CV- Regular rate and rythym, no murmurs rubs or gallops  Abdomen - Non tender, non distended, no bruits  Skin- warm, dry, no skin breakdown or gangrene  Extremities - no edema, both feet are warm. All skin is intact    Pulses Right - Femoral can't feel  Popliteal: 2+  Posterior tibial:    1+  Dorsalis pedis:  2+  Radial 2+     Pulses Left -Femoral can't feel  Popliteal: 2+  Posterior tibial:    1+  Dorsalis pedis:  2+  Radial 2+      Assessment:  1. Pain in both lower extremities       Vascular supply to legs seems normal  Sx sound neurogenic or metabolic in origin  No further vascular testing  Rtc prn  60 min chart review and pt encounter  Plan of care:    Follow up and evaluate.       Electronically signed by Kurt Chapin MD on 2/12/25 at 1:59 PM EST

## 2025-02-12 NOTE — PATIENT INSTRUCTIONS
SURVEY:    Thank you for allowing us to care for you today.    You may be receiving a survey from [facility] regarding your visit today- electronically or via mail.      Please help us by completing the survey as this will provide the needed feedback to ensure we are providing the very best care for you and your family.    If you cannot score us a very good on any question, please call the office to discuss how we could have made your experience a very good one.    Thank you.       STAFF:    Yazmin Menjivar, Priscilla TORO      CLINICAL STAFF:    Ni MARTINEZ, Lucía TORO, Amalia TORO, Caitlyn MARTINEZ

## 2025-02-18 ENCOUNTER — TELEPHONE (OUTPATIENT)
Dept: PRIMARY CARE CLINIC | Age: 65
End: 2025-02-18

## 2025-02-18 DIAGNOSIS — E11.69 TYPE 2 DIABETES MELLITUS WITH OTHER SPECIFIED COMPLICATION, WITHOUT LONG-TERM CURRENT USE OF INSULIN (HCC): ICD-10-CM

## 2025-02-18 DIAGNOSIS — M79.673 CHRONIC FOOT PAIN, UNSPECIFIED LATERALITY: ICD-10-CM

## 2025-02-18 DIAGNOSIS — G89.29 CHRONIC FOOT PAIN, UNSPECIFIED LATERALITY: ICD-10-CM

## 2025-02-18 NOTE — TELEPHONE ENCOUNTER
Called patient to give EMG results.     EMG results show polyneuropathy which could explain his symptoms. How is he doing? Pleas forward to Astrid.

## 2025-02-18 NOTE — TELEPHONE ENCOUNTER
Pt called back, advised him of results. He reports he is still in pain, would like to know next step.     Please advise

## 2025-02-19 RX ORDER — PREGABALIN 75 MG/1
75 CAPSULE ORAL 2 TIMES DAILY
Qty: 60 CAPSULE | Refills: 0 | Status: SHIPPED | OUTPATIENT
Start: 2025-02-19 | End: 2025-03-21

## 2025-02-19 NOTE — TELEPHONE ENCOUNTER
Patient states his pain isn't terrible enough to go to pain management. Its more of a \"foot went to sleep without the pins and needles.\" Would like to continue lyrica because that seemed to help. Would like to stay on the 75mg BID. Order pended and pharmacy verified.

## 2025-02-19 NOTE — TELEPHONE ENCOUNTER
Hello, 1) can you confirm current Lyrica dose - if It is 75mg BID we can increase this to 150mg BID  2) Consultation with pain management (to any available/per patient preference),  pend if agreeable thank you.  Electronically signed by Reji Resendiz MD on 2/18/2025 at 7:07 PM

## 2025-02-28 ENCOUNTER — HOSPITAL ENCOUNTER (OUTPATIENT)
Age: 65
Discharge: HOME | End: 2025-02-28

## 2025-03-12 ENCOUNTER — OFFICE VISIT (OUTPATIENT)
Dept: PRIMARY CARE CLINIC | Age: 65
End: 2025-03-12
Payer: COMMERCIAL

## 2025-03-12 VITALS
WEIGHT: 192 LBS | SYSTOLIC BLOOD PRESSURE: 182 MMHG | RESPIRATION RATE: 16 BRPM | HEART RATE: 68 BPM | BODY MASS INDEX: 28.44 KG/M2 | HEIGHT: 69 IN | DIASTOLIC BLOOD PRESSURE: 62 MMHG

## 2025-03-12 DIAGNOSIS — G93.31 POST VIRAL SYNDROME: ICD-10-CM

## 2025-03-12 DIAGNOSIS — M54.42 CHRONIC BILATERAL LOW BACK PAIN WITH BILATERAL SCIATICA: ICD-10-CM

## 2025-03-12 DIAGNOSIS — G89.29 CHRONIC BILATERAL LOW BACK PAIN WITH BILATERAL SCIATICA: ICD-10-CM

## 2025-03-12 DIAGNOSIS — G89.29 CHRONIC FOOT PAIN, UNSPECIFIED LATERALITY: ICD-10-CM

## 2025-03-12 DIAGNOSIS — E78.1 HYPERTRIGLYCERIDEMIA: ICD-10-CM

## 2025-03-12 DIAGNOSIS — C32.9 LARYNGEAL CANCER (HCC): ICD-10-CM

## 2025-03-12 DIAGNOSIS — M79.673 CHRONIC FOOT PAIN, UNSPECIFIED LATERALITY: ICD-10-CM

## 2025-03-12 DIAGNOSIS — E11.69 TYPE 2 DIABETES MELLITUS WITH OTHER SPECIFIED COMPLICATION, WITHOUT LONG-TERM CURRENT USE OF INSULIN (HCC): Primary | ICD-10-CM

## 2025-03-12 DIAGNOSIS — I10 PRIMARY HYPERTENSION: ICD-10-CM

## 2025-03-12 DIAGNOSIS — M54.41 CHRONIC BILATERAL LOW BACK PAIN WITH BILATERAL SCIATICA: ICD-10-CM

## 2025-03-12 PROCEDURE — 1123F ACP DISCUSS/DSCN MKR DOCD: CPT | Performed by: STUDENT IN AN ORGANIZED HEALTH CARE EDUCATION/TRAINING PROGRAM

## 2025-03-12 PROCEDURE — 3077F SYST BP >= 140 MM HG: CPT | Performed by: STUDENT IN AN ORGANIZED HEALTH CARE EDUCATION/TRAINING PROGRAM

## 2025-03-12 PROCEDURE — 99214 OFFICE O/P EST MOD 30 MIN: CPT | Performed by: STUDENT IN AN ORGANIZED HEALTH CARE EDUCATION/TRAINING PROGRAM

## 2025-03-12 PROCEDURE — G2211 COMPLEX E/M VISIT ADD ON: HCPCS | Performed by: STUDENT IN AN ORGANIZED HEALTH CARE EDUCATION/TRAINING PROGRAM

## 2025-03-12 PROCEDURE — 3078F DIAST BP <80 MM HG: CPT | Performed by: STUDENT IN AN ORGANIZED HEALTH CARE EDUCATION/TRAINING PROGRAM

## 2025-03-12 RX ORDER — PREGABALIN 75 MG/1
75 CAPSULE ORAL 2 TIMES DAILY
Qty: 60 CAPSULE | Refills: 0 | Status: CANCELLED | OUTPATIENT
Start: 2025-03-12 | End: 2025-04-11

## 2025-03-12 RX ORDER — LIDOCAINE 50 MG/G
1 PATCH TOPICAL DAILY
Qty: 10 PATCH | Refills: 0 | Status: SHIPPED | OUTPATIENT
Start: 2025-03-12 | End: 2025-03-22

## 2025-03-12 RX ORDER — KETOROLAC TROMETHAMINE 10 MG/1
TABLET, FILM COATED ORAL
COMMUNITY

## 2025-03-12 RX ORDER — PREGABALIN 75 MG/1
75 CAPSULE ORAL 2 TIMES DAILY
Qty: 60 CAPSULE | Refills: 0 | Status: SHIPPED | OUTPATIENT
Start: 2025-03-12 | End: 2025-04-11

## 2025-03-12 RX ORDER — ROPINIROLE 0.25 MG/1
TABLET, FILM COATED ORAL
COMMUNITY

## 2025-03-12 SDOH — ECONOMIC STABILITY: FOOD INSECURITY: WITHIN THE PAST 12 MONTHS, THE FOOD YOU BOUGHT JUST DIDN'T LAST AND YOU DIDN'T HAVE MONEY TO GET MORE.: NEVER TRUE

## 2025-03-12 SDOH — ECONOMIC STABILITY: FOOD INSECURITY: WITHIN THE PAST 12 MONTHS, YOU WORRIED THAT YOUR FOOD WOULD RUN OUT BEFORE YOU GOT MONEY TO BUY MORE.: NEVER TRUE

## 2025-03-12 ASSESSMENT — PATIENT HEALTH QUESTIONNAIRE - PHQ9
SUM OF ALL RESPONSES TO PHQ QUESTIONS 1-9: 0
2. FEELING DOWN, DEPRESSED OR HOPELESS: NOT AT ALL
1. LITTLE INTEREST OR PLEASURE IN DOING THINGS: NOT AT ALL

## 2025-03-12 NOTE — PROGRESS NOTES
[facility]  [address] [place] [address] , [address]  [place], [place], [address]    Zack Burris SrLudin is a 65 y.o. male with  has a past medical history of Arthritis, CAD (coronary artery disease), Cancer (HCC), Carpal tunnel syndrome, Chronic back pain, Depression, Diabetes mellitus (HCC), Gastroesophageal reflux disease, Hearing loss, Hyperlipidemia, Hypertension, Movement disorder, Obesity, and Restless legs syndrome.  Presented to the office today for:  Chief Complaint   Patient presents with    Diabetes    Hypertension    Hyperlipidemia    Back Pain    Influenza       Assessment/Plan   1. Type 2 diabetes mellitus with other specified complication, without long-term current use of insulin (HCC)  -     pregabalin (LYRICA) 75 MG capsule; Take 1 capsule by mouth 2 times daily for 30 days. Max Daily Amount: 150 mg, Disp-60 capsule, R-0Normal  -     Lipid Panel; Future  2. Chronic foot pain, unspecified laterality  -     pregabalin (LYRICA) 75 MG capsule; Take 1 capsule by mouth 2 times daily for 30 days. Max Daily Amount: 150 mg, Disp-60 capsule, R-0Normal  -     Comprehensive Metabolic Panel; Future  -     Hemoglobin A1C; Future  -     CBC with Auto Differential; Future  -     Albumin/Creatinine Ratio, Urine; Future  3. Laryngeal cancer (HCC)  4. Primary hypertension  5. Hypertriglyceridemia  6. Post viral syndrome  7. Chronic bilateral low back pain with bilateral sciatica  -     lidocaine (LIDODERM) 5 %; Place 1 patch onto the skin daily for 10 days 12 hours on, 12 hours off., Disp-10 patch, R-0Normal  Return in about 3 months (around 6/12/2025) for F/U Med Management.  Assessment & Plan  T2DM - Continue w/ diet/exercise at this time, no meds per patient, RBA of ongoing monitoring  HTN - continue w/ meds at the current doses, of toprol at 25mg BID, lisinopril at 20mg, lasix 40mg  Continue w/ statin at the current dose of crestor 40mg  Laryngeal Ca -2013/stable resolved   F/U with Specialists per

## 2025-03-14 ENCOUNTER — TELEPHONE (OUTPATIENT)
Dept: SURGERY | Age: 65
End: 2025-03-14

## 2025-03-14 DIAGNOSIS — I10 PRIMARY HYPERTENSION: Primary | ICD-10-CM

## 2025-03-14 NOTE — TELEPHONE ENCOUNTER
Writer called patient and advised patient to have an updated EKG completed for upcoming colonoscopy and EGD. Patient says he can have this completed later today.

## 2025-03-17 ENCOUNTER — TELEPHONE (OUTPATIENT)
Dept: PRIMARY CARE CLINIC | Age: 65
End: 2025-03-17

## 2025-03-17 NOTE — TELEPHONE ENCOUNTER
Attempted to call patient to notify him that his Lidocaine patches were denied by insurance so he should call them to figure out what is covered. Left message requesting he call the office back.

## 2025-03-20 ENCOUNTER — HOSPITAL ENCOUNTER (OUTPATIENT)
Age: 65
Discharge: HOME | End: 2025-03-20
Payer: COMMERCIAL

## 2025-03-20 DIAGNOSIS — I10 PRIMARY HYPERTENSION: ICD-10-CM

## 2025-03-20 LAB
EKG ATRIAL RATE: 71 BPM
EKG P AXIS: 62 DEGREES
EKG P-R INTERVAL: 204 MS
EKG Q-T INTERVAL: 400 MS
EKG QRS DURATION: 102 MS
EKG QTC CALCULATION (BAZETT): 434 MS
EKG R AXIS: 73 DEGREES
EKG T AXIS: 70 DEGREES
EKG VENTRICULAR RATE: 71 BPM

## 2025-03-20 PROCEDURE — 93005 ELECTROCARDIOGRAM TRACING: CPT

## 2025-03-31 ENCOUNTER — ANESTHESIA EVENT (OUTPATIENT)
Dept: OPERATING ROOM | Age: 65
End: 2025-03-31
Payer: COMMERCIAL

## 2025-04-01 ENCOUNTER — HOSPITAL ENCOUNTER (OUTPATIENT)
Age: 65
Setting detail: OUTPATIENT SURGERY
Discharge: HOME | End: 2025-04-01
Attending: SURGERY | Admitting: SURGERY
Payer: COMMERCIAL

## 2025-04-01 ENCOUNTER — ANESTHESIA (OUTPATIENT)
Dept: OPERATING ROOM | Age: 65
End: 2025-04-01
Payer: COMMERCIAL

## 2025-04-01 VITALS
WEIGHT: 184.4 LBS | BODY MASS INDEX: 27.31 KG/M2 | RESPIRATION RATE: 16 BRPM | DIASTOLIC BLOOD PRESSURE: 64 MMHG | TEMPERATURE: 97.3 F | SYSTOLIC BLOOD PRESSURE: 160 MMHG | HEART RATE: 60 BPM | HEIGHT: 69 IN | OXYGEN SATURATION: 99 %

## 2025-04-01 DIAGNOSIS — D64.9 ANEMIA: ICD-10-CM

## 2025-04-01 DIAGNOSIS — K21.9 GERD (GASTROESOPHAGEAL REFLUX DISEASE): ICD-10-CM

## 2025-04-01 PROCEDURE — 6370000000 HC RX 637 (ALT 250 FOR IP): Performed by: SURGERY

## 2025-04-01 PROCEDURE — 2709999900 HC NON-CHARGEABLE SUPPLY: Performed by: SURGERY

## 2025-04-01 PROCEDURE — 3700000001 HC ADD 15 MINUTES (ANESTHESIA): Performed by: SURGERY

## 2025-04-01 PROCEDURE — 6360000002 HC RX W HCPCS

## 2025-04-01 PROCEDURE — 3609012400 HC EGD TRANSORAL BIOPSY SINGLE/MULTIPLE: Performed by: SURGERY

## 2025-04-01 PROCEDURE — 7100000011 HC PHASE II RECOVERY - ADDTL 15 MIN: Performed by: SURGERY

## 2025-04-01 PROCEDURE — 3609010600 HC COLONOSCOPY POLYPECTOMY SNARE/COLD BIOPSY: Performed by: SURGERY

## 2025-04-01 PROCEDURE — 2580000003 HC RX 258: Performed by: NURSE ANESTHETIST, CERTIFIED REGISTERED

## 2025-04-01 PROCEDURE — 7100000010 HC PHASE II RECOVERY - FIRST 15 MIN: Performed by: SURGERY

## 2025-04-01 PROCEDURE — 3700000000 HC ANESTHESIA ATTENDED CARE: Performed by: SURGERY

## 2025-04-01 PROCEDURE — 88305 TISSUE EXAM BY PATHOLOGIST: CPT

## 2025-04-01 RX ORDER — PROPOFOL 10 MG/ML
INJECTION, EMULSION INTRAVENOUS
Status: DISCONTINUED | OUTPATIENT
Start: 2025-04-01 | End: 2025-04-01 | Stop reason: SDUPTHER

## 2025-04-01 RX ORDER — NALOXONE HYDROCHLORIDE 0.4 MG/ML
INJECTION, SOLUTION INTRAMUSCULAR; INTRAVENOUS; SUBCUTANEOUS PRN
Status: DISCONTINUED | OUTPATIENT
Start: 2025-04-01 | End: 2025-04-01 | Stop reason: HOSPADM

## 2025-04-01 RX ORDER — ESMOLOL HYDROCHLORIDE 10 MG/ML
INJECTION INTRAVENOUS
Status: DISCONTINUED | OUTPATIENT
Start: 2025-04-01 | End: 2025-04-01 | Stop reason: SDUPTHER

## 2025-04-01 RX ORDER — LIDOCAINE HYDROCHLORIDE 20 MG/ML
INJECTION, SOLUTION EPIDURAL; INFILTRATION; INTRACAUDAL; PERINEURAL
Status: DISCONTINUED | OUTPATIENT
Start: 2025-04-01 | End: 2025-04-01 | Stop reason: SDUPTHER

## 2025-04-01 RX ORDER — ONDANSETRON 2 MG/ML
4 INJECTION INTRAMUSCULAR; INTRAVENOUS
Status: DISCONTINUED | OUTPATIENT
Start: 2025-04-01 | End: 2025-04-01 | Stop reason: HOSPADM

## 2025-04-01 RX ORDER — SODIUM CHLORIDE 9 MG/ML
INJECTION, SOLUTION INTRAVENOUS PRN
Status: DISCONTINUED | OUTPATIENT
Start: 2025-04-01 | End: 2025-04-01 | Stop reason: HOSPADM

## 2025-04-01 RX ORDER — SIMETHICONE 40MG/0.6ML
SUSPENSION, DROPS(FINAL DOSAGE FORM)(ML) ORAL PRN
Status: DISCONTINUED | OUTPATIENT
Start: 2025-04-01 | End: 2025-04-01 | Stop reason: ALTCHOICE

## 2025-04-01 RX ORDER — SODIUM CHLORIDE, SODIUM LACTATE, POTASSIUM CHLORIDE, CALCIUM CHLORIDE 600; 310; 30; 20 MG/100ML; MG/100ML; MG/100ML; MG/100ML
INJECTION, SOLUTION INTRAVENOUS CONTINUOUS
Status: DISCONTINUED | OUTPATIENT
Start: 2025-04-01 | End: 2025-04-01 | Stop reason: HOSPADM

## 2025-04-01 RX ORDER — SODIUM CHLORIDE 0.9 % (FLUSH) 0.9 %
5-40 SYRINGE (ML) INJECTION PRN
Status: DISCONTINUED | OUTPATIENT
Start: 2025-04-01 | End: 2025-04-01 | Stop reason: HOSPADM

## 2025-04-01 RX ORDER — SODIUM CHLORIDE 0.9 % (FLUSH) 0.9 %
5-40 SYRINGE (ML) INJECTION EVERY 12 HOURS SCHEDULED
Status: DISCONTINUED | OUTPATIENT
Start: 2025-04-01 | End: 2025-04-01 | Stop reason: HOSPADM

## 2025-04-01 RX ADMIN — ESMOLOL HYDROCHLORIDE 30 MG: 10 INJECTION, SOLUTION INTRAVENOUS at 10:40

## 2025-04-01 RX ADMIN — SODIUM CHLORIDE, SODIUM LACTATE, POTASSIUM CHLORIDE, AND CALCIUM CHLORIDE: .6; .31; .03; .02 INJECTION, SOLUTION INTRAVENOUS at 09:41

## 2025-04-01 RX ADMIN — LIDOCAINE HYDROCHLORIDE 100 MG: 20 INJECTION, SOLUTION EPIDURAL; INFILTRATION; INTRACAUDAL; PERINEURAL at 10:19

## 2025-04-01 RX ADMIN — PROPOFOL 30 MG: 10 INJECTION, EMULSION INTRAVENOUS at 10:49

## 2025-04-01 RX ADMIN — PROPOFOL 30 MG: 10 INJECTION, EMULSION INTRAVENOUS at 10:53

## 2025-04-01 RX ADMIN — PROPOFOL 40 MG: 10 INJECTION, EMULSION INTRAVENOUS at 10:47

## 2025-04-01 RX ADMIN — PROPOFOL 180 MCG/KG/MIN: 10 INJECTION, EMULSION INTRAVENOUS at 10:20

## 2025-04-01 RX ADMIN — PROPOFOL 40 MG: 10 INJECTION, EMULSION INTRAVENOUS at 10:45

## 2025-04-01 RX ADMIN — PROPOFOL 100 MG: 10 INJECTION, EMULSION INTRAVENOUS at 10:19

## 2025-04-01 RX ADMIN — PROPOFOL 30 MG: 10 INJECTION, EMULSION INTRAVENOUS at 10:51

## 2025-04-01 RX ADMIN — ESMOLOL HYDROCHLORIDE 20 MG: 10 INJECTION, SOLUTION INTRAVENOUS at 10:48

## 2025-04-01 RX ADMIN — PROPOFOL 40 MG: 10 INJECTION, EMULSION INTRAVENOUS at 10:41

## 2025-04-01 RX ADMIN — ESMOLOL HYDROCHLORIDE 50 MG: 10 INJECTION, SOLUTION INTRAVENOUS at 10:20

## 2025-04-01 ASSESSMENT — PAIN - FUNCTIONAL ASSESSMENT: PAIN_FUNCTIONAL_ASSESSMENT: 0-10

## 2025-04-01 ASSESSMENT — LIFESTYLE VARIABLES: SMOKING_STATUS: 0

## 2025-04-01 ASSESSMENT — PAIN SCALES - GENERAL
PAINLEVEL_OUTOF10: 0

## 2025-04-01 NOTE — H&P
Date End Date Taking? Authorizing Provider   ketorolac (TORADOL) 10 MG tablet ketorolac 10 mg tablet    Anuradha Tavares MD   rOPINIRole (REQUIP) 0.25 MG tablet ropinirole 0.25 mg tablet    Anuradha Tavares MD   pregabalin (LYRICA) 75 MG capsule Take 1 capsule by mouth 2 times daily for 30 days. Max Daily Amount: 150 mg 3/12/25 4/11/25  Reji Resendiz MD   pramipexole (MIRAPEX) 1.5 MG tablet Take 0.5 tablets by mouth at bedtime    Anuradha Tavares MD   rosuvastatin (CRESTOR) 40 MG tablet Take 1 tablet by mouth daily 12/16/24   Reji Resendiz MD   omeprazole (PRILOSEC) 20 MG delayed release capsule Take 1 capsule by mouth daily 12/16/24   Reji Resendiz MD   lisinopril (PRINIVIL;ZESTRIL) 20 MG tablet Take 1 tablet by mouth daily 12/16/24   Reji Resendiz MD   fenofibrate (TRICOR) 48 MG tablet Take 1 tablet by mouth daily 12/16/24   Reji Resendiz MD   tamsulosin (FLOMAX) 0.4 MG capsule Take 1 capsule by mouth daily 12/16/24   Reji Resendiz MD   metoprolol succinate (TOPROL XL) 25 MG extended release tablet Take 1 tablet by mouth in the morning and at bedtime 12/4/24   Reji Resendiz MD   diclofenac (VOLTAREN) 50 MG EC tablet Take 1 tablet by mouth 3 times daily as needed for Pain  Patient not taking: Reported on 3/20/2025 12/4/24   Reji Resendiz MD   amitriptyline (ELAVIL) 10 MG tablet Take 1 tablet by mouth nightly 12/4/24   Reji Resendiz MD   metoprolol succinate (TOPROL XL) 50 MG extended release tablet Take 0.5 tablets by mouth in the morning and 0.5 tablets in the evening. 10/31/24   Reji Resendiz MD   furosemide (LASIX) 40 MG tablet Take 1 tablet by mouth daily 10/31/24   Reji Resendiz MD   tiZANidine (ZANAFLEX) 4 MG tablet Take 1 tablet by mouth every 6 hours as needed (pain) 10/14/24   Reji Resendiz MD   magnesium (MAGNESIUM-OXIDE) 250 MG TABS tablet  1/1/24   Provider, MD Anuradha   Turmeric Curcumin 500 MG CAPS  1/1/24   Provider, MD Anuradha   diclofenac sodium (VOLTAREN) 1 % GEL

## 2025-04-01 NOTE — DISCHARGE INSTRUCTIONS
1) You have some gastric erosions (superficial ulcers).  Biopsies were done  2) You had one good size polyp which was removed.  3) You will be called  with the biopsy results and recommendations.  - We will be able to make more accurate recommendations we know whether or not your brother and father really had colon cancer or not, and at what ages they had the colon cancer.  4) You have some diverticulosis which is a common finding and may never give you problems.  5) Follow up with me as desired.    Discharge Instructions for EGD     An EGD or upper GI endoscopy is a visual exam of the lining of the esophagus, stomach (gastric) and duodenum (the first part of the small intestine). An endoscopy is a flexible tube with a light and a viewing device. It allows the doctor to view the inside of the colon through a tiny video camera.   EGD is performed for many reasons: unexplained anemia , pain, bleeding, heart burn, among many other reasons.   Complications from a EGD are rare. Some possible serious complications include perforated bowel (which might require surgery) and bleeding (which could require blood transfusion ). Minor complications include bloating, gas, and cramping that can last for 1-2 days after the procedure.     Because air is put into your upper GI during the procedure, it is normal to pass large amounts of air from your rectum and burp a lot.     What You Will Need   Someone to drive you home after the procedure    Steps to Take   Home Care    Rest when you get home.    Because the sedative will make you drowsy, don't drive, operate machinery, or make important decisions the day of the procedure.      Feelings of bloating, gas, or cramping may persist for 24 hours.   Diet    Try sips of water first. If tolerated, resume regular diet or the diet recommended by your physician.    Do not drink alcohol for 24 hours.    Physical Activity    You may return to work tomorrow.    Do not drive, operate heavy

## 2025-04-01 NOTE — ANESTHESIA POSTPROCEDURE EVALUATION
Department of Anesthesiology  Postprocedure Note    Patient: Zack Burris Sr.  MRN: [id number]  YOB: 1960  Date of evaluation: 4/1/2025    Procedure Summary       Date: 04/01/25 Room / Location: [place] [address] / [facility]    Anesthesia Start: 1015 Anesthesia Stop: 1059    Procedures:       COLONOSCOPY POLYPECTOMY SNARE/BIOPSY      ESOPHAGOGASTRODUODENOSCOPY BIOPSY Diagnosis:       GERD (gastroesophageal reflux disease)      Anemia      (GERD (gastroesophageal reflux disease) [K21.9])      (Anemia [D64.9])    Surgeons: Mango Padron MD Responsible Provider: Nicole Lovelace APRN - CRNA    Anesthesia Type: TIVA, General ASA Status: 3            Anesthesia Type: TIVA, General    Sharmin Phase I: Sharmin Score: 10    Sharmin Phase II: Sharmin Score: 10    Anesthesia Post Evaluation    Patient location during evaluation: PACU  Patient participation: complete - patient participated  Level of consciousness: awake and alert  Pain score: 0  Airway patency: patent  Nausea & Vomiting: no nausea and no vomiting  Cardiovascular status: blood pressure returned to baseline and hemodynamically stable  Respiratory status: acceptable  Hydration status: euvolemic  Multimodal analgesia pain management approach  Pain management: adequate        No notable events documented.

## 2025-04-01 NOTE — ANESTHESIA PRE PROCEDURE
Department of Anesthesiology  Preprocedure Note       Name:  Zack Burris Sr.   Age:  65 y.o.  :  1960                                          MRN:  [id number]         Date:  2025      Surgeon: Surgeon(s):  Mango Padron MD    Procedure: Procedure(s):  COLORECTAL CANCER SCREENING, NOT HIGH RISK  ESOPHAGOGASTRODUODENOSCOPY    Medications prior to admission:   Prior to Admission medications    Medication Sig Start Date End Date Taking? Authorizing Provider   ASPIRIN 81 PO Take by mouth daily    Yes Anuradha Tavares MD   ketorolac (TORADOL) 10 MG tablet ketorolac 10 mg tablet    Anuradha Tavares MD   rOPINIRole (REQUIP) 0.25 MG tablet ropinirole 0.25 mg tablet    Anuradha Tavares MD   pregabalin (LYRICA) 75 MG capsule Take 1 capsule by mouth 2 times daily for 30 days. Max Daily Amount: 150 mg 3/12/25 4/11/25  Reji Resendiz MD   pramipexole (MIRAPEX) 1.5 MG tablet Take 0.5 tablets by mouth at bedtime    Anuradha Tavares MD   rosuvastatin (CRESTOR) 40 MG tablet Take 1 tablet by mouth daily 24   Reji Resendiz MD   omeprazole (PRILOSEC) 20 MG delayed release capsule Take 1 capsule by mouth daily 24   Reji Resendiz MD   lisinopril (PRINIVIL;ZESTRIL) 20 MG tablet Take 1 tablet by mouth daily 24   Reji Resendiz MD   fenofibrate (TRICOR) 48 MG tablet Take 1 tablet by mouth daily 24   Reji Resendiz MD   tamsulosin (FLOMAX) 0.4 MG capsule Take 1 capsule by mouth daily 24   Reji Resendiz MD   metoprolol succinate (TOPROL XL) 25 MG extended release tablet Take 1 tablet by mouth in the morning and at bedtime 24   Reji Resendiz MD   diclofenac (VOLTAREN) 50 MG EC tablet Take 1 tablet by mouth 3 times daily as needed for Pain  Patient not taking: Reported on 3/20/2025 12/4/24   Reji Resendiz MD   amitriptyline (ELAVIL) 10 MG tablet Take 1 tablet by mouth nightly 24   Reji Resendiz MD   metoprolol succinate (TOPROL XL) 50 MG extended release tablet

## 2025-04-02 ENCOUNTER — RESULTS FOLLOW-UP (OUTPATIENT)
Dept: SURGERY | Age: 65
End: 2025-04-02

## 2025-04-02 LAB — SURGICAL PATHOLOGY REPORT: NORMAL

## 2025-04-03 NOTE — TELEPHONE ENCOUNTER
----- Message from Dr. Mango Padron MD sent at 4/2/2025  3:42 PM EDT -----  Minimal inflammation on antral biopsies consistent with gross erosions seen on the scope.  The single polyp he had was over 10 mm and an adenoma, so he should consider a repeat colonoscopy in 3 years. For completeness, he was unsure if his father had colon cancer or not.  Has he found out yet or can he find out an let us know so we have correct information?

## 2025-05-09 LAB
ESTIMATED AVERAGE GLUCOSE: NORMAL
HBA1C MFR BLD: 6 %

## 2025-05-22 DIAGNOSIS — K21.9 GASTROESOPHAGEAL REFLUX DISEASE, UNSPECIFIED WHETHER ESOPHAGITIS PRESENT: ICD-10-CM

## 2025-05-22 DIAGNOSIS — M79.673 CHRONIC FOOT PAIN, UNSPECIFIED LATERALITY: ICD-10-CM

## 2025-05-22 DIAGNOSIS — G89.29 CHRONIC FOOT PAIN, UNSPECIFIED LATERALITY: ICD-10-CM

## 2025-05-22 DIAGNOSIS — I10 PRIMARY HYPERTENSION: ICD-10-CM

## 2025-05-22 DIAGNOSIS — E78.1 HYPERTRIGLYCERIDEMIA: ICD-10-CM

## 2025-05-22 DIAGNOSIS — E11.69 TYPE 2 DIABETES MELLITUS WITH OTHER SPECIFIED COMPLICATION, WITHOUT LONG-TERM CURRENT USE OF INSULIN (HCC): ICD-10-CM

## 2025-05-22 RX ORDER — METOPROLOL SUCCINATE 25 MG/1
25 TABLET, EXTENDED RELEASE ORAL 2 TIMES DAILY
Qty: 180 TABLET | Refills: 1 | Status: SHIPPED | OUTPATIENT
Start: 2025-05-22

## 2025-05-22 RX ORDER — LISINOPRIL 20 MG/1
20 TABLET ORAL DAILY
Qty: 90 TABLET | Refills: 1 | Status: SHIPPED | OUTPATIENT
Start: 2025-05-22

## 2025-05-22 RX ORDER — ROSUVASTATIN CALCIUM 40 MG/1
40 TABLET, COATED ORAL DAILY
Qty: 90 TABLET | Refills: 1 | Status: SHIPPED | OUTPATIENT
Start: 2025-05-22

## 2025-05-22 RX ORDER — OMEPRAZOLE 20 MG/1
20 CAPSULE, DELAYED RELEASE ORAL DAILY
Qty: 90 CAPSULE | Refills: 1 | Status: SHIPPED | OUTPATIENT
Start: 2025-05-22

## 2025-05-22 RX ORDER — PREGABALIN 75 MG/1
75 CAPSULE ORAL 2 TIMES DAILY
Qty: 60 CAPSULE | Refills: 0 | Status: SHIPPED | OUTPATIENT
Start: 2025-05-22 | End: 2025-06-21

## (undated) DEVICE — TUBING SUCT NON-STRL 9/32X100 W/CNNT

## (undated) DEVICE — DRAPE,UTILTY,TAPE,15X26, 4EA/PK: Brand: MEDLINE

## (undated) DEVICE — SHOE POSTOP M M 7.5-9 WOM 8.5-10 HI ANK SQUARED STRP RIG

## (undated) DEVICE — CANNULA ORAL NSL AD CO2 N INTUB O2 DEL DISP TRU LNK

## (undated) DEVICE — GOWN,AURORA,NON-REINFORCED,2XL: Brand: MEDLINE

## (undated) DEVICE — TOWEL SURG SM W12XL18IN CLR PLAS TEAR RESIST REINF ADH FRST

## (undated) DEVICE — AVANOS* QUINCKE NEEDLE: Brand: AVANOS

## (undated) DEVICE — SOLUTION PREP POVIDONE IOD FOR SKIN MUCOUS MEM PRIOR TO

## (undated) DEVICE — STAPLER INT L340MM 45MM STD 12 FIRING B FRM PWR + GRIPPING

## (undated) DEVICE — PACK LAP BASIC

## (undated) DEVICE — GLOVE SURG SZ 75 L12IN FNGR THK79MIL GRN LTX FREE

## (undated) DEVICE — SOLUTION IRRIG 1000ML 0.9% SOD CHL USP POUR PLAS BTL

## (undated) DEVICE — SPONGE GZ W4XL4IN COT 12 PLY TYP VII WVN C FLD DSGN STERILE

## (undated) DEVICE — SUTURE VCRL + SZ 2-0 L27IN ABSRB VLT SH 1/2 CIR TAPERPOINT VCP317H

## (undated) DEVICE — SUTURE ETHLN SZ 4-0 L18IN NONABSORBABLE BLK L19MM PS-2 3/8 1667H

## (undated) DEVICE — CANNULA IV 18GA L15IN BLNT FILL LUERLOCK HUB MJCT

## (undated) DEVICE — FORCEPS BX L240CM JAW DIA2.8MM L CAP W/ NDL MIC MESH TOOTH

## (undated) DEVICE — ACUSNARE POLYPECTOMY SNARE: Brand: ACUSNARE

## (undated) DEVICE — SOLUTION IV IRRIG POUR BRL 0.9% SODIUM CHL 2F7124

## (undated) DEVICE — GLOVE SURG SZ 8 CRM LTX FREE POLYISOPRENE POLYMER BEAD ANTI

## (undated) DEVICE — SYRINGE MED 10ML LUERLOCK TIP W/O SFTY DISP

## (undated) DEVICE — SUTURE VCRL + SZ 3-0 L27IN ABSRB UD L26MM SH 1/2 CIR VCP416H

## (undated) DEVICE — TUBING, SUCTION, 9/32" X 12', STRAIGHT: Brand: MEDLINE INDUSTRIES, INC.

## (undated) DEVICE — GOWN,SIRUS,POLYRNF,BRTHSLV,XL,30/CS: Brand: MEDLINE

## (undated) DEVICE — PAIN TRAY: Brand: MEDLINE INDUSTRIES, INC.

## (undated) DEVICE — Device

## (undated) DEVICE — TOTAL TRAY, DB, 100% SILI FOLEY, 16FR 10: Brand: MEDLINE

## (undated) DEVICE — TRAP SURG QUAD PARABOLA SLOT DSGN SFTY SCRN TRAPEASE

## (undated) DEVICE — BUR SURG L44.5MM HD DIA2.1MM CARB CRSS CUT FISS FAST CUT

## (undated) DEVICE — RELOAD STPL L45MM H1.5-3.6MM REG TISS BLU GRIPPING SURF B

## (undated) DEVICE — SUTURE SZ 0 27IN 5/8 CIR UR-6  TAPER PT VIOLET ABSRB VICRYL J603H

## (undated) DEVICE — NEEDLE HYPO 25GA L1.5IN BLU POLYPR HUB S STL REG BVL STR

## (undated) DEVICE — YANKAUER,FLEXIBLE HANDLE,REGLR CAPACITY: Brand: MEDLINE INDUSTRIES, INC.

## (undated) DEVICE — TROCAR: Brand: KII FIOS FIRST ENTRY

## (undated) DEVICE — RELOAD STPL L45MM H1-2.6MM MESENTERY THN TISS WHT GRIPPING

## (undated) DEVICE — APPLICATOR MEDICATED 3 CC SOLUTION HI LT ORNG CHLORAPREP 930415

## (undated) DEVICE — GLOVE ORANGE PI 8 1/2   MSG9085

## (undated) DEVICE — PAD ADH AD ELECTRD 2 PLT W 5M CRD

## (undated) DEVICE — PRECISION THIN (9.0 X 0.38 X 18.5MM)

## (undated) DEVICE — HAND AND FT PK

## (undated) DEVICE — ENDOSCOPIC KIT 1.1+ 10 FT OP4 CA DE 2 GWN AAMI LEVEL 3

## (undated) DEVICE — 4.0MM EGG BUR

## (undated) DEVICE — FORCEP SPEC RETRV BX AD 2 MMX155 CM 5 MM GI OVL CUP W/ NDL

## (undated) DEVICE — TUBING SUCT 12FR MAL ALUM SHFT FN CAP VENT UNIV CONN W/ OBT

## (undated) DEVICE — GLOVE ORANGE PI 7 1/2   MSG9075

## (undated) DEVICE — INTENDED FOR TISSUE SEPARATION, AND OTHER PROCEDURES THAT REQUIRE A SHARP SURGICAL BLADE TO PUNCTURE OR CUT.: Brand: BARD-PARKER ® CARBON RIB-BACK BLADES

## (undated) DEVICE — BAG SPEC LAP H6IN DIA3IN 250ML 10 12MM CANN ATTCH MEM WIRE

## (undated) DEVICE — GAUZE,SPONGE,FLUFF,6"X6.75",STRL,5/TRAY: Brand: MEDLINE

## (undated) DEVICE — WARMER SCP 2 ANTIFOG LAP DISP

## (undated) DEVICE — PENCIL ES ULT VAC W TELSCP NOSE EZ CLN BLDE 10FT

## (undated) DEVICE — COLONOSCOPE ENDOSCP PED M DIA11MM BLU DISP ENDOCUFF VISN

## (undated) DEVICE — BLADE OPHTH 180DEG CUT SURF BLU STR SHRP DBL BVL GRINDLESS

## (undated) DEVICE — TROCARS: Brand: KII® BALLOON BLUNT TIP SYSTEM

## (undated) DEVICE — SUTURE MCRYL + SZ 4-0 L27IN ABSRB UD L19MM PS-2 3/8 CIR MCP426H